# Patient Record
Sex: FEMALE | Race: WHITE | Employment: OTHER | URBAN - METROPOLITAN AREA
[De-identification: names, ages, dates, MRNs, and addresses within clinical notes are randomized per-mention and may not be internally consistent; named-entity substitution may affect disease eponyms.]

---

## 2017-02-13 DIAGNOSIS — G25.0 ESSENTIAL AND OTHER SPECIFIED FORMS OF TREMOR: ICD-10-CM

## 2017-02-13 DIAGNOSIS — M54.16 LUMBAR RADICULAR PAIN: ICD-10-CM

## 2017-02-13 DIAGNOSIS — M79.7 FIBROMYALGIA: ICD-10-CM

## 2017-02-13 DIAGNOSIS — F02.80 DEMENTIA IN ALZHEIMER'S DISEASE WITH LATE ONSET: ICD-10-CM

## 2017-02-13 DIAGNOSIS — M48.061 SPINAL STENOSIS, LUMBAR: ICD-10-CM

## 2017-02-13 DIAGNOSIS — R26.0 ATAXIC GAIT: ICD-10-CM

## 2017-02-13 DIAGNOSIS — G25.2 ESSENTIAL AND OTHER SPECIFIED FORMS OF TREMOR: ICD-10-CM

## 2017-02-13 DIAGNOSIS — M96.1 LUMBAR POST-LAMINECTOMY SYNDROME: ICD-10-CM

## 2017-02-13 DIAGNOSIS — G30.1 DEMENTIA IN ALZHEIMER'S DISEASE WITH LATE ONSET: ICD-10-CM

## 2017-02-13 RX ORDER — GABAPENTIN 300 MG/1
CAPSULE ORAL
Qty: 100 CAP | Refills: 5 | Status: SHIPPED | OUTPATIENT
Start: 2017-02-13 | End: 2017-08-18 | Stop reason: SDUPTHER

## 2017-02-28 ENCOUNTER — HOSPITAL ENCOUNTER (OUTPATIENT)
Dept: GENERAL RADIOLOGY | Age: 82
Discharge: HOME | End: 2017-02-28
Payer: MEDICARE

## 2017-02-28 DIAGNOSIS — M25.473 ANKLE EDEMA: ICD-10-CM

## 2017-02-28 DIAGNOSIS — M79.672 PAIN IN LEFT FOOT: ICD-10-CM

## 2017-02-28 PROCEDURE — 73610 X-RAY EXAM OF ANKLE: CPT

## 2017-02-28 PROCEDURE — 73630 X-RAY EXAM OF FOOT: CPT

## 2017-07-05 ENCOUNTER — TELEPHONE (OUTPATIENT)
Dept: NEUROLOGY | Age: 82
End: 2017-07-05

## 2017-07-05 NOTE — TELEPHONE ENCOUNTER
Balance is worse, memory worse, patient has fallen.   Needs to be seen prior to January 2018   Meredith Adams (Daughter)Please call her [phone]

## 2017-07-07 ENCOUNTER — OFFICE VISIT (OUTPATIENT)
Dept: NEUROLOGY | Age: 82
End: 2017-07-07

## 2017-07-07 VITALS
RESPIRATION RATE: 16 BRPM | SYSTOLIC BLOOD PRESSURE: 132 MMHG | BODY MASS INDEX: 28.32 KG/M2 | DIASTOLIC BLOOD PRESSURE: 60 MMHG | HEIGHT: 65 IN | HEART RATE: 79 BPM | OXYGEN SATURATION: 98 % | WEIGHT: 170 LBS

## 2017-07-07 DIAGNOSIS — I67.89 CEREBRAL MICROVASCULAR DISEASE: ICD-10-CM

## 2017-07-07 DIAGNOSIS — E55.9 VITAMIN D DEFICIENCY: ICD-10-CM

## 2017-07-07 DIAGNOSIS — R26.0 ATAXIC GAIT: ICD-10-CM

## 2017-07-07 DIAGNOSIS — F02.80 DEMENTIA IN ALZHEIMER'S DISEASE WITH LATE ONSET: Primary | ICD-10-CM

## 2017-07-07 DIAGNOSIS — I65.23 STENOSIS OF BOTH INTERNAL CAROTID ARTERIES: ICD-10-CM

## 2017-07-07 DIAGNOSIS — G25.0 ESSENTIAL AND OTHER SPECIFIED FORMS OF TREMOR: ICD-10-CM

## 2017-07-07 DIAGNOSIS — M54.16 LUMBAR RADICULAR PAIN: ICD-10-CM

## 2017-07-07 DIAGNOSIS — G30.1 DEMENTIA IN ALZHEIMER'S DISEASE WITH LATE ONSET: Primary | ICD-10-CM

## 2017-07-07 DIAGNOSIS — M96.1 LUMBAR POST-LAMINECTOMY SYNDROME: ICD-10-CM

## 2017-07-07 DIAGNOSIS — E03.4 HYPOTHYROIDISM DUE TO ACQUIRED ATROPHY OF THYROID: ICD-10-CM

## 2017-07-07 DIAGNOSIS — M48.061 SPINAL STENOSIS, LUMBAR: ICD-10-CM

## 2017-07-07 DIAGNOSIS — M79.7 FIBROMYALGIA: ICD-10-CM

## 2017-07-07 DIAGNOSIS — G25.2 ESSENTIAL AND OTHER SPECIFIED FORMS OF TREMOR: ICD-10-CM

## 2017-07-07 NOTE — LETTER
7/7/2017 8:53 PM 
 
Patient:  Kp Rangel YOB: 1934 Date of Visit: 7/7/2017 Dear No Recipients: Thank you for referring Ms. Kp Rangel to me for evaluation/treatment. Below are the relevant portions of my assessment and plan of care. Consult Subjective:  
 
Kp Rangel is a 80 y. o.right-handed  female seen today for evaluation of new problem of increasing gait instability, and 4 falls over the last 6-9 months, and increasing difficulty with some memory loss and increasing confusion noted by her physical therapist.  She is seen at the request of Dr. Aarti Sinclair. The family is aware that she is on a lot of medication and would like to have her taper some down. I suggested they consider tapering down her clonazepam at half a tablet every 2-4 weeks and see if they can get her off of that in view of the increased risk of benzodiazepines in the elderly. 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 She has known dementia and I advised the family to expect continued worsening of her memory loss with that. She is already on Aricept, and does not want to consider adding new medication. She denies any headache, any increased neck pain, but does have chronic low back pain and post lumbar laminectomy syndrome and is followed by pain management physician with intermittent injections in her spine. This may be also contributing to her gait instability. Both her daughters bring her in for urgent evaluation of these problems and are highly concerned and she seen on an urgent work in basis. We will refer her to physical therapy for gait training and strengthening and balance training in addition. We will check her metabolic parameters to see if there is any other treatable cause for her progressive symptoms.   We will also check a CT scan of the head in view of her frequent falls and increasing gait instability. [facility], but not really in assisted living. She still drives and has not had an accident but only drives locally. She feels no problem doing it. She has also seen for increasing back pain and has spinal cord stimulator insertion for post lumbar laminectomy syndrome. Patient had spinal cord stimulator inserted in April 2016, and is still recovering slowly, and she see her pain management doctor for increasing back pain. She has weakness in her legs and difficulty walking, and she had an MRI scan of the lumbar spine apparently shows a disc and spinal stenosis. She doesn't seem to have much new weakness in the legs, and her bowel and bladder function is stable except for increasing difficulty emptying her bladder. She is seeing a urologist. Because of her increased pain she sees Dr. Hamzah Metzger, her pain management specialist, who has treated her in the past.  She is not exercising like she is supposed too,but she says she will do it on her own now. She had previous back surgery and has a postlaminectomy syndrome, and now a new lumbar disc and spinal stenosis. Besides the weakness in her legs, she's had no other new weakness, sensory loss visual changes or headaches, or other new neurological problems except for the neurogenic bladder. She is using a cane or walker for ambulation because of her back pain. She Has had no other new medical problems complications or illnesses except for a bout of diverticulitis. She is on Aricept 10 mg a day and is on clonazepam .5 mg each day. She no longer uses Ambien for sleep and takes vitamins regularly.  Her past medical history is pertinent for hypertension, depression, GERD, asthma, arthritis, post laminectomy syndrome, lumbar radiculopathy, a central tremor, dementia, ataxic gait, cataract surgery, cervical fusion, right carpal tunnel syndrome, post cervical laminectomy fusion. Hysterectomy and bilateral salpingo-oophorectomy, gallbladder surgery, right carpal tunnel surgery. Past Medical History:  
Diagnosis Date  Arthritis  Asthma  Chronic pain  GERD (gastroesophageal reflux disease)  Hypertension  Vertigo Past Surgical History:  
Procedure Laterality Date  HX APPENDECTOMY [facility] BACK SURGERY  05/21/2015  
 stimulator [employer]  HX CATARACT REMOVAL    
 bilateral  
 HX CERVICAL FUSION    
 anterior disc fusion  HX HEENT    
 uppp  HX LAP CHOLECYSTECTOMY  HX ORTHOPAEDIC    
 right carpal tunnel release  HX MATT AND BSO Family History Problem Relation Age of Onset  Stroke Mother  Cancer Father Social History Substance Use Topics  Smoking status: Former Smoker  Smokeless tobacco: Never Used Comment: stopped 25 plus years ago  Alcohol use No  
   
Current Outpatient Prescriptions Medication Sig Dispense Refill  gabapentin (NEURONTIN) 300 mg capsule TAKE 1 CAPSULE BY MOUTH THREE TIMES A  Cap 5  
 CYANOCOBALAMIN, VITAMIN B-12, (VITAMIN B-12 PO) Take  by mouth daily.  aspirin delayed-release 81 mg tablet Take  by mouth daily.  CETIRIZINE HCL (ZYRTEC PO) Take  by mouth.  colestipol (COLESTID) 1 gram tablet Take 1 g by mouth two (2) times a day.  clonazePAM (KLONOPIN) 0.5 mg tablet 1 mg two (2) times a day. 1/2 tab @ noon, and 1 tab @ bedtime  FR-MV-DN-Fe-Min-Lycopen-Lutein (CENTRUM) 0.4-162-18 mg tab Take  by mouth.  traZODone (DESYREL) 50 mg tablet Take 50 mg by mouth nightly.  donepezil (ARICEPT) 10 mg tablet Take 1 Tab by mouth daily. 90 Tab 3  
 dicyclomine (BENTYL) 10 mg capsule Take 10 mg by mouth four (4) times daily.  losartan (COZAAR) 100 mg tablet Take 100 mg by mouth daily.  sertraline (ZOLOFT) 100 mg tablet Take 100 mg by mouth every morning.  omeprazole (PRILOSEC) 40 mg capsule Take 40 mg by mouth daily.  budesonide-formoterol (SYMBICORT) 160-4.5 mcg/Actuation HFA inhaler Take 2 Puffs by inhalation as needed. Allergies Allergen Reactions  Adhesive Tape Other (comments)  
  hurts the area it is placed on Review of Systems: A comprehensive review of systems was negative except for: Musculoskeletal: positive for myalgias, arthralgias, stiff joints and back pain Neurological: positive for memory problems and tremor Behvioral/Psych: positive for anxiety and depression Objective: I 
 
 
NEUROLOGICAL EXAM: 
 
Appearance: The patient is well developed, well nourished, provides a fair history and is in no acute distress. Mental Status: Oriented to time, place and person and the president, but misses the day of the month. Her speech is 100% intelligible, and she has mild cognitive impairment because of her dementia. She can't remember only one of 3 words at 30 seconds with distraction, and cannot subtract serial sevens, or spell the word world backward, or draw a clock that shows a time 10 minutes to 11. Patient is a little slow mentally for some recent memory loss but actually functioning fairly well in her ADLs. Mood and affect slightly anxious and depressed. Cranial Nerves:   Intact visual fields. Fundi are benign. FRANK, EOM's full, no nystagmus, no ptosis. Facial sensation is normal. Corneal reflexes are not tested. Facial movement is symmetric. Hearing is abnormal bilaterally. Palate is midline with normal sternocleidomastoid and trapezius muscles are normal. Tongue is midline. Neck without meningismus or bruits Motor:  4/5 strength in upper and lower proximal and distal muscles. Normal bulk and tone. No fasciculations. Patient has negative straight leg raising test negative in the sitting position but has percussion tenderness over the lumbar spine Patient had a spinal stimulator in the lumbar spine with stable surgical incisions Reflexes:   Deep tendon reflexes 1+/4 and symmetrical. No Babinski or clonus present Sensory:   Normal to touch, pinprick and DSS, and temperature and vibration mildly decreased in both lower extremities. Gait:  Abnormal gait for age, as she does move slowly due to arthritis in her back, walk with a limp on her right leg, and normally uses a cane for ambulation. Tremor:   Mild intention tremor noted. Cerebellar:  Mldly abnormal Romberg and tandem cerebellar signs present. Neurovascular:  Normal heart sounds and regular rhythm, peripheral pulses decreased in both feet, and no carotid bruits. Assessment:  
 
 
Plan:  
 
Patient is to start physical therapy for gait training and strengthening at her therapist and new prescription given to patient for therapy Patient is encouraged to continue her vitamins, vitamin D, remain mentally and physically active, and start exercising more 35 minutes spent with patient and the 2 daughters, and advised him that she has a progressive degenerative brain condition that will only get worse with time. Patient is to decrease her clonazepam slowly, one half tablet every 2-4 weeks to see if she can get off of it and perhaps stabilize her gait some more and possibly her memory Patient is to get CT of the head and metabolic parameters as ordered. Hopefully we will rule out treatable causes of her progressive symptoms. Patient is to continue Neurontin 300 mg 3 times a day because of her neuralgic type pain, increasing fibromyalgia symptoms and increasing back pain and nerve root pain, for now. She was advised of the side effects as far as drowsiness, sedation, dizziness, edema and weight gain Patient is to continue her other medication and try to continue a regular exercise program 
Patient is to see her pain management specialist, for continued treatment and spinal cord stimulator followup Patient is to continue current medications and start exercising more Patient is to start a regular exercise program and continue her multivitamins and vitamin D on a regular basis and her Aricept Patient not anxious for new medications like Namenda, and seems to handle her ADLs okay and is driving without difficulty around the house so far We discussed with her and her daughter in detail, if there is any doubt about her driving ability she should stop immediately or get tested by [facility] Patient will be seen again in 6 months time or earlier as needed. She will call if any problem in the interim. Discuss her condition with patient and daughters at length,  
Time of visit was 35 minutes today She is to keep the lights on in the house at night and be careful when she moves or changes position quickly She will call if any problem in the interim. Signed By: Cline Nyhan, MD   
 July 7, 2017 This note will not be viewable in [address] If you have questions, please do not hesitate to call me. I look forward to following Ms. Rell Price along with you. Sincerely, Cline Nyhan, MD

## 2017-07-07 NOTE — MR AVS SNAPSHOT
Visit Information Date & Time Provider Department Dept. Phone Encounter #  
 7/7/2017 12:00 PM Heraclio Walker MD Neurology Clinic at [facility] [phone] [id number] Follow-up Instructions Return in about 6 months (around 1/7/2018). Your Appointments 1/9/2018  3:00 PM  
Follow Up with Heraclio Walker MD  
Neurology Clinic at [facility] Appt Note: follow up. ..tlw 6/28/17  
 [address] [facility], 
[address] [place] [address], [address] [phone]  
[address] [facility], [address] [place] [address], [address] [phone] Upcoming Health Maintenance Date Due DTaP/Tdap/Td series (1 - Tdap) 6/24/1955 ZOSTER VACCINE AGE 60> 6/24/1994 GLAUCOMA SCREENING Q2Y 6/24/1999 OSTEOPOROSIS SCREENING (DEXA) 6/24/1999 Pneumococcal 65+ Low/Medium Risk (1 of 2 - PCV13) 6/24/1999 MEDICARE YEARLY EXAM 6/24/1999 INFLUENZA AGE 9 TO ADULT 8/1/2017 Allergies as of 7/7/2017  Review Complete On: 7/7/2017 By: Heraclio Walker MD  
  
 Severity Noted Reaction Type Reactions Adhesive Tape Low 07/15/2010   Side Effect Other (comments)  
 hurts the area it is placed on  
  
Current Immunizations  Never Reviewed No immunizations on file. Not reviewed this visit You Were Diagnosed With   
  
 Codes Comments Dementia in Alzheimer's disease with late onset    -  Primary ICD-10-CM: G30.1, F02.80 ICD-9-CM: 331.0, 294.10 Lumbar radicular pain     ICD-10-CM: M54.16 
ICD-9-CM: 724.4 Essential and other specified forms of tremor     ICD-10-CM: G25.0, G25.2 ICD-9-CM: 333.1 Spinal stenosis, lumbar     ICD-10-CM: M48.06 
ICD-9-CM: 724.02 Lumbar post-laminectomy syndrome     ICD-10-CM: M96.1 ICD-9-CM: 722.83 Ataxic gait     ICD-10-CM: R26.0 ICD-9-CM: 481. 2 Fibromyalgia     ICD-10-CM: M79.7 ICD-9-CM: 729.1 Vitamin D deficiency     ICD-10-CM: E55.9 ICD-9-CM: 268.9 Hypothyroidism due to acquired atrophy of thyroid     ICD-10-CM: E03.4 ICD-9-CM: 244.8, 246.8 Cerebral microvascular disease     ICD-10-CM: I67.9 ICD-9-CM: 437.9 Stenosis of both internal carotid arteries     ICD-10-CM: I65.23 ICD-9-CM: 433.10, 433.30 Vitals BP Pulse Resp Height(growth percentile) Weight(growth percentile) SpO2  
 132/60 79 16 5' 5\" (1.651 m) 170 lb (77.1 kg) 98% BMI Smoking Status 28.29 kg/m2 Former Smoker Vitals History BMI and BSA Data Body Mass Index Body Surface Area  
 28.29 kg/m 2 1.88 m 2 [facility] [address] [facility] [place] [address] [phone] Your Updated Medication List  
  
   
This list is accurate as of: 7/7/17  1:06 PM.  Always use your most recent med list.  
  
  
  
  
 aspirin delayed-release 81 mg tablet Take  by mouth daily. CENTRUM 0.4-162-18 mg Tab Generic drug:  YL-ON-BA-Fe-Min-Lycopen-Lutein Take  by mouth.  
  
 clonazePAM 0.5 mg tablet Commonly known as:  KlonoPIN  
1 mg two (2) times a day. 1/2 tab @ noon, and 1 tab @ bedtime COLESTID 1 gram tablet Generic drug:  colestipol Take 1 g by mouth two (2) times a day. dicyclomine 10 mg capsule Commonly known as:  BENTYL Take 10 mg by mouth four (4) times daily. donepezil 10 mg tablet Commonly known as:  ARICEPT Take 1 Tab by mouth daily. gabapentin 300 mg capsule Commonly known as:  NEURONTIN  
TAKE 1 CAPSULE BY MOUTH THREE TIMES A DAY  
  
 losartan 100 mg tablet Commonly known as:  COZAAR Take 100 mg by mouth daily. omeprazole 40 mg capsule Commonly known as:  PRILOSEC Take 40 mg by mouth daily. sertraline 100 mg tablet Commonly known as:  ZOLOFT Take 100 mg by mouth every morning. SYMBICORT 160-4.5 mcg/actuation HFA inhaler Generic drug:  budesonide-formoterol Take 2 Puffs by inhalation as needed. traZODone 50 mg tablet Commonly known as:  Wilmer Ort Take 50 mg by mouth nightly. VITAMIN B-12 PO Take  by mouth daily. ZYRTEC PO Take  by mouth. We Performed the Following GABAPENTIN [id number] CPT(R)] TSH 3RD GENERATION [15887 CPT(R)] VITAMIN B12 & FOLATE [92192 CPT(R)] VITAMIN D, 25 HYROXY PANEL [[id number] Custom] Follow-up Instructions Return in about 6 months (around 1/7/2018). To-Do List   
 07/13/2017 Imaging:  CT HEAD WO CONT   
  
 07/13/2017 Imaging:  DUPLEX CAROTID BILATERAL AMB NEURO Patient Instructions A Healthy Lifestyle: Care Instructions Your Care Instructions A healthy lifestyle can help you feel good, stay at a healthy weight, and have plenty of energy for both work and play. A healthy lifestyle is something you can share with your whole family. A healthy lifestyle also can lower your risk for serious health problems, such as high blood pressure, heart disease, and diabetes. You can follow a few steps listed below to improve your health and the health of your family. Follow-up care is a key part of your treatment and safety. Be sure to make and go to all appointments, and call your doctor if you are having problems. Its also a good idea to know your test results and keep a list of the medicines you take. How can you care for yourself at home? · Do not eat too much sugar, fat, or fast foods. You can still have dessert and treats now and then. The goal is moderation. · Start small to improve your eating habits. Pay attention to portion sizes, drink less juice and soda pop, and eat more fruits and vegetables. ¨ Eat a healthy amount of food. A 3-ounce serving of meat, for example, is about the size of a deck of cards. Fill the rest of your plate with vegetables and whole grains. ¨ Limit the amount of soda and sports drinks you have every day.  Drink more water when you are thirsty. ¨ Eat at least 5 servings of fruits and vegetables every day. It may seem like a lot, but it is not hard to reach this goal. A serving or helping is 1 piece of fruit, 1 cup of vegetables, or 2 cups of leafy, raw vegetables. Have an apple or some carrot sticks as an afternoon snack instead of a candy bar. Try to have fruits and/or vegetables at every meal. 
· Make exercise part of your daily routine. You may want to start with simple activities, such as walking, bicycling, or slow swimming. Try to be active 30 to 60 minutes every day. You do not need to do all 30 to 60 minutes all at once. For example, you can exercise 3 times a day for 10 or 20 minutes. Moderate exercise is safe for most people, but it is always a good idea to talk to your doctor before starting an exercise program. 
· Keep moving. Ahsan Boron the lawn, work in the garden, or Relayr. Take the stairs instead of the elevator at work. · If you smoke, quit. People who smoke have an increased risk for heart attack, stroke, cancer, and other lung illnesses. Quitting is hard, but there are ways to boost your chance of quitting tobacco for good. ¨ Use nicotine gum, patches, or lozenges. ¨ Ask your doctor about stop-smoking programs and medicines. ¨ Keep trying. In addition to reducing your risk of diseases in the future, you will notice some benefits soon after you stop using tobacco. If you have shortness of breath or asthma symptoms, they will likely get better within a few weeks after you quit. · Limit how much alcohol you drink. Moderate amounts of alcohol (up to 2 drinks a day for men, 1 drink a day for women) are okay. But drinking too much can lead to liver problems, high blood pressure, and other health problems. Family health If you have a family, there are many things you can do together to improve your health. · Eat meals together as a family as often as possible. · Eat healthy foods. This includes fruits, vegetables, lean meats and dairy, and whole grains. · Include your family in your fitness plan. Most people think of activities such as jogging or tennis as the way to fitness, but there are many ways you and your family can be more active. Anything that makes you breathe hard and gets your heart pumping is exercise. Here are some tips: 
¨ Walk to do errands or to take your child to school or the bus. ¨ Go for a family bike ride after dinner instead of watching TV. Where can you learn more? Go to http://marthaTimetricgiuseppe.info/. Enter C749 in the search box to learn more about \"A Healthy Lifestyle: Care Instructions. \" Current as of: July 26, 2016 Content Version: 11.3 © 4755-3494 [employer]. Care instructions adapted under license by [employer] (which disclaims liability or warranty for this information). If you have questions about a medical condition or this instruction, always ask your healthcare professional. [place] [address] any warranty or liability for your use of this information. Introducing [facility]! [facility] introduces [employer] patient portal. Now you can access parts of your medical record, email your doctor's office, and request medication refills online. 1. In your internet browser, go to [email or web address] 2. Click on the First Time User? Click Here link in the Sign In box. You will see the New Member Sign Up page. 3. Enter your [employer] Access Code exactly as it appears below. You will not need to use this code after youve completed the sign-up process. If you do not sign up before the expiration date, you must request a new code. · [employer] Access Code: [email or web address] Expires: 10/5/2017  1:06 PM 
 
4. Enter the last four digits of your Social Security Number (xxxx) and Date of Birth (mm/dd/yyyy) as indicated and click Submit.  You will be taken to the next sign-up page. 5. Create a [employer] ID. This will be your [employer] login ID and cannot be changed, so think of one that is secure and easy to remember. 6. Create a [employer] password. You can change your password at any time. 7. Enter your Password Reset Question and Answer. This can be used at a later time if you forget your password. 8. Enter your e-mail address. You will receive e-mail notification when new information is available in [address] 9. Click Sign Up. You can now view and download portions of your medical record. 10. Click the Download Summary menu link to download a portable copy of your medical information. If you have questions, please visit the Frequently Asked Questions section of the [employer] website. Remember, [employer] is NOT to be used for urgent needs. For medical emergencies, dial 911. Now available from your iPhone and Android! Please provide this summary of care documentation to your next provider. Your primary care clinician is listed as Gunjan Cherry III. If you have any questions after today's visit, please call [phone].

## 2017-07-07 NOTE — PATIENT INSTRUCTIONS

## 2017-07-08 NOTE — PROGRESS NOTES
Consult    Subjective:     Shankar Steinberg is a 80 y. o.right-handed  female seen today for evaluation of new problem of increasing gait instability, and 4 falls over the last 6-9 months, and increasing difficulty with some memory loss and increasing confusion noted by her physical therapist.  She is seen at the request of Dr. Babak Ordonez. The family is aware that she is on a lot of medication and would like to have her taper some down. I suggested they consider tapering down her clonazepam at half a tablet every 2-4 weeks and see if they can get her off of that in view of the increased risk of benzodiazepines in the elderly. 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 She has known dementia and I advised the family to expect continued worsening of her memory loss with that. She is already on Aricept, and does not want to consider adding new medication. She denies any headache, any increased neck pain, but does have chronic low back pain and post lumbar laminectomy syndrome and is followed by pain management physician with intermittent injections in her spine. This may be also contributing to her gait instability. Both her daughters bring her in for urgent evaluation of these problems and are highly concerned and she seen on an urgent work in basis. We will refer her to physical therapy for gait training and strengthening and balance training in addition. We will check her metabolic parameters to see if there is any other treatable cause for her progressive symptoms. We will also check a CT scan of the head in view of her frequent falls and increasing gait instability. [facility], but not really in assisted living. She still drives and has not had an accident but only drives locally. She feels no problem doing it.   She has also seen for increasing back pain and has spinal cord stimulator insertion for post lumbar laminectomy syndrome. Patient had spinal cord stimulator inserted in April 2016, and is still recovering slowly, and she see her pain management doctor for increasing back pain. She has weakness in her legs and difficulty walking, and she had an MRI scan of the lumbar spine apparently shows a disc and spinal stenosis. She doesn't seem to have much new weakness in the legs, and her bowel and bladder function is stable except for increasing difficulty emptying her bladder. She is seeing a urologist. Because of her increased pain she sees Dr. Micaela Cheek, her pain management specialist, who has treated her in the past.  She is not exercising like she is supposed too,but she says she will do it on her own now. She had previous back surgery and has a postlaminectomy syndrome, and now a new lumbar disc and spinal stenosis. Besides the weakness in her legs, she's had no other new weakness, sensory loss visual changes or headaches, or other new neurological problems except for the neurogenic bladder. She is using a cane or walker for ambulation because of her back pain. She Has had no other new medical problems complications or illnesses except for a bout of diverticulitis. She is on Aricept 10 mg a day and is on clonazepam .5 mg each day. She no longer uses Ambien for sleep and takes vitamins regularly. Her past medical history is pertinent for hypertension, depression, GERD, asthma, arthritis, post laminectomy syndrome, lumbar radiculopathy, a central tremor, dementia, ataxic gait, cataract surgery, cervical fusion, right carpal tunnel syndrome, post cervical laminectomy fusion. Hysterectomy and bilateral salpingo-oophorectomy, gallbladder surgery, right carpal tunnel surgery.     Past Medical History:   Diagnosis Date    Arthritis     Asthma     Chronic pain     GERD (gastroesophageal reflux disease)     Hypertension     Vertigo       Past Surgical History:   Procedure Laterality Date    HX APPENDECTOMY      HX BACK SURGERY  05/21/2015    stimulator [employer]     HX CATARACT REMOVAL      bilateral    HX CERVICAL FUSION      anterior disc fusion    HX HEENT      uppp    HX LAP CHOLECYSTECTOMY      HX ORTHOPAEDIC      right carpal tunnel release    HX MATT AND BSO       Family History   Problem Relation Age of Onset    Stroke Mother     Cancer Father       Social History   Substance Use Topics    Smoking status: Former Smoker    Smokeless tobacco: Never Used      Comment: stopped 25 plus years ago    Alcohol use No       Current Outpatient Prescriptions   Medication Sig Dispense Refill    gabapentin (NEURONTIN) 300 mg capsule TAKE 1 CAPSULE BY MOUTH THREE TIMES A  Cap 5    CYANOCOBALAMIN, VITAMIN B-12, (VITAMIN B-12 PO) Take  by mouth daily.  aspirin delayed-release 81 mg tablet Take  by mouth daily.  CETIRIZINE HCL (ZYRTEC PO) Take  by mouth.  colestipol (COLESTID) 1 gram tablet Take 1 g by mouth two (2) times a day.  clonazePAM (KLONOPIN) 0.5 mg tablet 1 mg two (2) times a day. 1/2 tab @ noon, and 1 tab @ bedtime      BP-QN-NV-Fe-Min-Lycopen-Lutein (CENTRUM) 0.4-162-18 mg tab Take  by mouth.  traZODone (DESYREL) 50 mg tablet Take 50 mg by mouth nightly.  donepezil (ARICEPT) 10 mg tablet Take 1 Tab by mouth daily. 90 Tab 3    dicyclomine (BENTYL) 10 mg capsule Take 10 mg by mouth four (4) times daily.  losartan (COZAAR) 100 mg tablet Take 100 mg by mouth daily.  sertraline (ZOLOFT) 100 mg tablet Take 100 mg by mouth every morning.  omeprazole (PRILOSEC) 40 mg capsule Take 40 mg by mouth daily.  budesonide-formoterol (SYMBICORT) 160-4.5 mcg/Actuation HFA inhaler Take 2 Puffs by inhalation as needed.           Allergies   Allergen Reactions    Adhesive Tape Other (comments)     hurts the area it is placed on        Review of Systems:  A comprehensive review of systems was negative except for: Musculoskeletal: positive for myalgias, arthralgias, stiff joints and back pain  Neurological: positive for memory problems and tremor  Behvioral/Psych: positive for anxiety and depression     Objective:     I      NEUROLOGICAL EXAM:    Appearance: The patient is well developed, well nourished, provides a fair history and is in no acute distress. Mental Status: Oriented to time, place and person and the president, but misses the day of the month. Her speech is 100% intelligible, and she has mild cognitive impairment because of her dementia. She can't remember only one of 3 words at 30 seconds with distraction, and cannot subtract serial sevens, or spell the word world backward, or draw a clock that shows a time 10 minutes to 11. Patient is a little slow mentally for some recent memory loss but actually functioning fairly well in her ADLs. Mood and affect slightly anxious and depressed. Cranial Nerves:   Intact visual fields. Fundi are benign. FRANK, EOM's full, no nystagmus, no ptosis. Facial sensation is normal. Corneal reflexes are not tested. Facial movement is symmetric. Hearing is abnormal bilaterally. Palate is midline with normal sternocleidomastoid and trapezius muscles are normal. Tongue is midline. Neck without meningismus or bruits   Motor:  4/5 strength in upper and lower proximal and distal muscles. Normal bulk and tone. No fasciculations. Patient has negative straight leg raising test negative in the sitting position but has percussion tenderness over the lumbar spine  Patient had a spinal stimulator in the lumbar spine with stable surgical incisions   Reflexes:   Deep tendon reflexes 1+/4 and symmetrical. No Babinski or clonus present   Sensory:   Normal to touch, pinprick and DSS, and temperature and vibration mildly decreased in both lower extremities. Gait:  Abnormal gait for age, as she does move slowly due to arthritis in her back, walk with a limp on her right leg, and normally uses a cane for ambulation.    Tremor:   Mild intention tremor noted. Cerebellar:  Mldly abnormal Romberg and tandem cerebellar signs present. Neurovascular:  Normal heart sounds and regular rhythm, peripheral pulses decreased in both feet, and no carotid bruits. Assessment:       Plan:     Patient is to start physical therapy for gait training and strengthening at her therapist and new prescription given to patient for therapy  Patient is encouraged to continue her vitamins, vitamin D, remain mentally and physically active, and start exercising more  35 minutes spent with patient and the 2 daughters, and advised him that she has a progressive degenerative brain condition that will only get worse with time. Patient is to decrease her clonazepam slowly, one half tablet every 2-4 weeks to see if she can get off of it and perhaps stabilize her gait some more and possibly her memory  Patient is to get CT of the head and metabolic parameters as ordered. Hopefully we will rule out treatable causes of her progressive symptoms. Patient is to continue Neurontin 300 mg 3 times a day because of her neuralgic type pain, increasing fibromyalgia symptoms and increasing back pain and nerve root pain, for now.   She was advised of the side effects as far as drowsiness, sedation, dizziness, edema and weight gain  Patient is to continue her other medication and try to continue a regular exercise program  Patient is to see her pain management specialist, for continued treatment and spinal cord stimulator followup  Patient is to continue current medications and start exercising more   Patient is to start a regular exercise program and continue her multivitamins and vitamin D on a regular basis and her Aricept  Patient not anxious for new medications like Namenda, and seems to handle her ADLs okay and is driving without difficulty around the house so far  We discussed with her and her daughter in detail, if there is any doubt about her driving ability she should stop immediately or get tested by [facility]  Patient will be seen again in 6 months time or earlier as needed. She will call if any problem in the interim. Discuss her condition with patient and daughters at length,   Time of visit was 35 minutes today  She is to keep the lights on in the house at night and be careful when she moves or changes position quickly  She will call if any problem in the interim. Signed By: Alisha Palacios MD     July 7, 2017       This note will not be viewable in [address]

## 2017-07-11 LAB
25(OH)D2 SERPL-MCNC: 1.9 NG/ML
25(OH)D3 SERPL-MCNC: 30 NG/ML
25(OH)D3+25(OH)D2 SERPL-MCNC: 32 NG/ML
FOLATE SERPL-MCNC: 15.9 NG/ML
GABAPENTIN SERPLBLD-MCNC: 5.9 UG/ML (ref 4–16)
TSH SERPL DL<=0.005 MIU/L-ACNC: 2.15 UIU/ML (ref 0.45–4.5)
VIT B12 SERPL-MCNC: 1245 PG/ML (ref 211–946)

## 2017-07-12 ENCOUNTER — HOSPITAL ENCOUNTER (OUTPATIENT)
Dept: CT IMAGING | Age: 82
Discharge: HOME | End: 2017-07-12
Payer: MEDICARE

## 2017-07-12 DIAGNOSIS — M96.1 LUMBAR POST-LAMINECTOMY SYNDROME: ICD-10-CM

## 2017-07-12 DIAGNOSIS — M79.7 FIBROMYALGIA: ICD-10-CM

## 2017-07-12 DIAGNOSIS — M54.16 LUMBAR RADICULAR PAIN: ICD-10-CM

## 2017-07-12 DIAGNOSIS — I67.89 CEREBRAL MICROVASCULAR DISEASE: ICD-10-CM

## 2017-07-12 DIAGNOSIS — E55.9 VITAMIN D DEFICIENCY: ICD-10-CM

## 2017-07-12 DIAGNOSIS — I65.23 STENOSIS OF BOTH INTERNAL CAROTID ARTERIES: ICD-10-CM

## 2017-07-12 DIAGNOSIS — G25.0 ESSENTIAL AND OTHER SPECIFIED FORMS OF TREMOR: ICD-10-CM

## 2017-07-12 DIAGNOSIS — E03.4 HYPOTHYROIDISM DUE TO ACQUIRED ATROPHY OF THYROID: ICD-10-CM

## 2017-07-12 DIAGNOSIS — M48.061 SPINAL STENOSIS, LUMBAR: ICD-10-CM

## 2017-07-12 DIAGNOSIS — G30.1 DEMENTIA IN ALZHEIMER'S DISEASE WITH LATE ONSET: ICD-10-CM

## 2017-07-12 DIAGNOSIS — G25.2 ESSENTIAL AND OTHER SPECIFIED FORMS OF TREMOR: ICD-10-CM

## 2017-07-12 DIAGNOSIS — F02.80 DEMENTIA IN ALZHEIMER'S DISEASE WITH LATE ONSET: ICD-10-CM

## 2017-07-12 DIAGNOSIS — R26.0 ATAXIC GAIT: ICD-10-CM

## 2017-07-12 PROCEDURE — 70450 CT HEAD/BRAIN W/O DYE: CPT

## 2017-07-13 ENCOUNTER — OFFICE VISIT (OUTPATIENT)
Dept: NEUROLOGY | Age: 82
End: 2017-07-13

## 2017-07-13 DIAGNOSIS — G30.1 DEMENTIA IN ALZHEIMER'S DISEASE WITH LATE ONSET: ICD-10-CM

## 2017-07-13 DIAGNOSIS — E55.9 VITAMIN D DEFICIENCY: ICD-10-CM

## 2017-07-13 DIAGNOSIS — G25.2 ESSENTIAL AND OTHER SPECIFIED FORMS OF TREMOR: ICD-10-CM

## 2017-07-13 DIAGNOSIS — M96.1 LUMBAR POST-LAMINECTOMY SYNDROME: ICD-10-CM

## 2017-07-13 DIAGNOSIS — M54.16 LUMBAR RADICULAR PAIN: ICD-10-CM

## 2017-07-13 DIAGNOSIS — M79.7 FIBROMYALGIA: ICD-10-CM

## 2017-07-13 DIAGNOSIS — M48.061 SPINAL STENOSIS, LUMBAR: ICD-10-CM

## 2017-07-13 DIAGNOSIS — E03.4 HYPOTHYROIDISM DUE TO ACQUIRED ATROPHY OF THYROID: ICD-10-CM

## 2017-07-13 DIAGNOSIS — F02.80 DEMENTIA IN ALZHEIMER'S DISEASE WITH LATE ONSET: ICD-10-CM

## 2017-07-13 DIAGNOSIS — I65.23 STENOSIS OF BOTH INTERNAL CAROTID ARTERIES: ICD-10-CM

## 2017-07-13 DIAGNOSIS — I67.89 CEREBRAL MICROVASCULAR DISEASE: Primary | ICD-10-CM

## 2017-07-13 DIAGNOSIS — R26.0 ATAXIC GAIT: ICD-10-CM

## 2017-07-13 DIAGNOSIS — G25.0 ESSENTIAL AND OTHER SPECIFIED FORMS OF TREMOR: ICD-10-CM

## 2017-07-14 NOTE — PROCEDURES
This study consisted of pulsed wave Doppler examination, Color-flow imaging, and Duplex imaging of both the right and left carotid systems, and both vertebral arteries.        Imaging of both right and left carotid systems showed mild mixed plaquing at the bifurcations and proximal and distal internal and external carotid arteries bilaterally, with stenosis in the range of 16-49 % only and with no flow abnormalities identified.        Left vertebral artery showed normal antegrade flow, but the right vertebral artery was not able to be imaged, most likely secondary to technical difficulty, but cannot completely rule out occlusive disease or congenital variant.   If clinically indicated other imaging modalities like MRA or CTA may be of further diagnostic benefit.         Clinical correlation recommended

## 2017-07-17 ENCOUNTER — TELEPHONE (OUTPATIENT)
Dept: NEUROLOGY | Age: 82
End: 2017-07-17

## 2017-07-17 NOTE — TELEPHONE ENCOUNTER
----- Message from Felipe Hernandez sent at 7/17/2017  9:11 AM EDT -----  Regarding: Dr. Mary Beth Kumar  Pt's daughter Yanci Rodriguez called to get results from Doppler and CT done last week on 7/12, 7/13. Her best contact number is [phone].

## 2017-07-17 NOTE — TELEPHONE ENCOUNTER
----- Message from Shelly Santoro sent at 7/17/2017  9:11 AM EDT -----  Regarding: Dr. Jyoti Cutler Pt's daughter Stefano Roman called to get results from Doppler and CT done last week on 7/12, 7/13. Her best contact number is [phone].

## 2017-07-18 NOTE — TELEPHONE ENCOUNTER
Erik Munoz already talked to her in the hospital when I met her in the hallway, can you just call her and see if she still needs another call back because I already gave her the results

## 2017-07-21 ENCOUNTER — TELEPHONE (OUTPATIENT)
Dept: NEUROLOGY | Age: 82
End: 2017-07-21

## 2017-07-21 NOTE — TELEPHONE ENCOUNTER
----- Message from Haris Cartagena sent at 7/21/2017  8:35 AM EDT -----  Regarding: /telephone  Philayaka Diastcher,pt's daughter, have some concerns about the pt's diagnoses that's on her record. Best contact number is [phone].

## 2017-07-25 ENCOUNTER — TELEPHONE (OUTPATIENT)
Dept: NEUROLOGY | Age: 82
End: 2017-07-25

## 2017-07-25 NOTE — TELEPHONE ENCOUNTER
----- Message from Nawaf Mueller sent at 7/25/2017 10:56 AM EDT -----  Regarding: Dr. Miramontes/ telephone   Pt would like a return phone call regarding the diagnosis of dementia and Alzheimer's disease late onset. Pt and her daughter would like to discuss the diagnosis and get more information, because the pt and her daughter feel like she does not have it. Pt can be reached at [phone]. Hugh Dickinson, pt's daughter would like to speak with Dr. Neida Hunter, and needs to know when is the best time to reach him. Hugh Dickinson can be reached at [phone].

## 2017-07-25 NOTE — TELEPHONE ENCOUNTER
I called the patient, told her she has mild early dementia of Alzheimer's type, she has been on Aricept for years for the same problem, and she should continue that as she is being treated for.

## 2017-08-18 DIAGNOSIS — M79.7 FIBROMYALGIA: ICD-10-CM

## 2017-08-18 DIAGNOSIS — M48.061 SPINAL STENOSIS, LUMBAR: ICD-10-CM

## 2017-08-18 DIAGNOSIS — G30.1 DEMENTIA IN ALZHEIMER'S DISEASE WITH LATE ONSET: ICD-10-CM

## 2017-08-18 DIAGNOSIS — F02.80 DEMENTIA IN ALZHEIMER'S DISEASE WITH LATE ONSET: ICD-10-CM

## 2017-08-18 DIAGNOSIS — G25.2 ESSENTIAL AND OTHER SPECIFIED FORMS OF TREMOR: ICD-10-CM

## 2017-08-18 DIAGNOSIS — M54.16 LUMBAR RADICULAR PAIN: ICD-10-CM

## 2017-08-18 DIAGNOSIS — G25.0 ESSENTIAL AND OTHER SPECIFIED FORMS OF TREMOR: ICD-10-CM

## 2017-08-18 DIAGNOSIS — M96.1 LUMBAR POST-LAMINECTOMY SYNDROME: ICD-10-CM

## 2017-08-18 DIAGNOSIS — R26.0 ATAXIC GAIT: ICD-10-CM

## 2017-08-18 RX ORDER — GABAPENTIN 300 MG/1
CAPSULE ORAL
Qty: 270 CAP | Refills: 2 | Status: SHIPPED | OUTPATIENT
Start: 2017-08-18 | End: 2018-01-09 | Stop reason: DRUGHIGH

## 2017-08-18 NOTE — TELEPHONE ENCOUNTER
Future Appointments  Date Time Provider Durga Lizy   1/9/2018 3:00 PM Tangela Cabrera MD [address]                         Last Appointment My Department:  7/13/2017    Please advise of refill below.   Requested Prescriptions     Pending Prescriptions Disp Refills    gabapentin (NEURONTIN) 300 mg capsule 270 Cap 2     Sig: TAKE 1 CAPSULE BY MOUTH THREE TIMES A DAY

## 2017-12-16 ENCOUNTER — HOSPITAL ENCOUNTER (OUTPATIENT)
Dept: CT IMAGING | Age: 82
Discharge: HOME | End: 2017-12-16
Attending: PHYSICAL MEDICINE & REHABILITATION
Payer: MEDICARE

## 2017-12-16 DIAGNOSIS — M47.817 LUMBOSACRAL SPONDYLOSIS WITHOUT MYELOPATHY: ICD-10-CM

## 2017-12-16 DIAGNOSIS — M54.41 BILATERAL LOW BACK PAIN WITH RIGHT-SIDED SCIATICA: ICD-10-CM

## 2017-12-16 PROCEDURE — 72131 CT LUMBAR SPINE W/O DYE: CPT

## 2018-01-09 ENCOUNTER — OFFICE VISIT (OUTPATIENT)
Dept: NEUROLOGY | Age: 83
End: 2018-01-09

## 2018-01-09 VITALS
DIASTOLIC BLOOD PRESSURE: 66 MMHG | BODY MASS INDEX: 27.66 KG/M2 | OXYGEN SATURATION: 96 % | HEIGHT: 65 IN | SYSTOLIC BLOOD PRESSURE: 148 MMHG | HEART RATE: 96 BPM | WEIGHT: 166 LBS

## 2018-01-09 DIAGNOSIS — M54.16 LUMBAR RADICULAR PAIN: ICD-10-CM

## 2018-01-09 DIAGNOSIS — G25.0 BENIGN ESSENTIAL TREMOR SYNDROME: ICD-10-CM

## 2018-01-09 DIAGNOSIS — R26.0 ATAXIC GAIT: ICD-10-CM

## 2018-01-09 DIAGNOSIS — M79.7 FIBROMYALGIA: ICD-10-CM

## 2018-01-09 PROBLEM — F33.9 RECURRENT DEPRESSION (HCC): Status: ACTIVE | Noted: 2018-01-09

## 2018-01-09 RX ORDER — GABAPENTIN 600 MG/1
600 TABLET ORAL 3 TIMES DAILY
Qty: 100 TAB | Refills: 11 | Status: SHIPPED | OUTPATIENT
Start: 2018-01-09 | End: 2018-07-06 | Stop reason: ALTCHOICE

## 2018-01-09 NOTE — LETTER
1/9/2018 8:43 PM 
 
Patient:  Lester Gill YOB: 1934 Date of Visit: 1/9/2018 Dear No Recipients: Thank you for referring Ms. Lester Gill to me for evaluation/treatment. Below are the relevant portions of my assessment and plan of care. Consult Subjective:  
 
Lester Gill is a 80 y. o.right-handed  female seen today at the request of Dr. Grey Gillespie for evaluation of new problem of increasing lower back pain that began in early December, and was severe, and she saw her pain management physician who increased her Neurontin to 600 mg from 300 mg 3 times a day, and that has finally over the last several days just started to provide relief for her back pain. She is trying to avoid repeat steroid injections and nerve blocks in her back. Since that is helping we told her she needs to continue that dose but she has no refill so we gave her refills for that medication. She has moved in with her daughter and her physical health seems to have taken a marked turn for the better. She seems less depressed and much more with it, and does not seem to have his may behavioral problems as much trouble with her memory. She continues to take the Aricept, does not think she needs any new medication. Her daughter feels that she is stable also. She has had no more falls, and 6 months ago she had gait instability, and 4 falls over the last 6-9 months, and increasing difficulty with some memory loss and increasing confusion noted by her physical therapist.  That all seems to be somewhat better. The patient's family was concerned about her taking too much medication, remain to taper her clonazepam down to one half a 0.5 mg tablet twice a day she is seems to be able to control her intestinal spasms with that and is doing much better.   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 She has known dementia and I advised the family to expect continued worsening of her memory loss with that. She is already on Aricept, and does not want to consider adding new medication. She denies any headache, any increased neck pain, but does have chronic low back pain and post lumbar laminectomy syndrome and is followed by pain management physician with intermittent injections in her spine. This may be also contributing to her gait instability. She had a normal CT of the head 6 months ago because of her ataxia, and did physical therapy. She still drives and has not had an accident but only drives locally. She feels no problem doing it. She has also seen for increasing back pain and has spinal cord stimulator insertion for post lumbar laminectomy syndrome. Patient had spinal cord stimulator inserted in April 2016, and is still recovering slowly, and she see her pain management doctor for increasing back pain. She has weakness in her legs and difficulty walking, and she had an MRI scan of the lumbar spine apparently shows a disc and spinal stenosis. She doesn't seem to have much new weakness in the legs, and her bowel and bladder function is stable except for increasing difficulty emptying her bladder. She is seeing a urologist. Because of her increased pain she sees Dr. Apple Smiley, her pain management specialist, who has treated her in the past.  She is not exercising like she is supposed too,but she says she will do it on her own now. She had previous back surgery and has a postlaminectomy syndrome, and now a new lumbar disc and spinal stenosis. Besides the weakness in her legs, she's had no other new weakness, sensory loss visual changes or headaches, or other new neurological problems except for the neurogenic bladder. She is using a cane or walker for ambulation because of her back pain.  
 
She Has had no other new medical problems complications or illnesses except for a bout of diverticulitis. She is on Aricept 10 mg a day and is on clonazepam .5 mg each day. She no longer uses Ambien for sleep and takes vitamins regularly. Her past medical history is pertinent for hypertension, depression, GERD, asthma, arthritis, post laminectomy syndrome, lumbar radiculopathy, a central tremor, dementia, ataxic gait, cataract surgery, cervical fusion, right carpal tunnel syndrome, post cervical laminectomy fusion. Hysterectomy and bilateral salpingo-oophorectomy, gallbladder surgery, right carpal tunnel surgery. Past Medical History:  
Diagnosis Date  Arthritis  Asthma  Chronic pain  GERD (gastroesophageal reflux disease)  Hypertension  Vertigo Past Surgical History:  
Procedure Laterality Date  HX APPENDECTOMY [facility] BACK SURGERY  05/21/2015  
 stimulator [employer]  HX CATARACT REMOVAL    
 bilateral  
 HX CERVICAL FUSION    
 anterior disc fusion  HX HEENT    
 uppp  HX LAP CHOLECYSTECTOMY  HX ORTHOPAEDIC    
 right carpal tunnel release  HX MATT AND BSO Family History Problem Relation Age of Onset  Stroke Mother  Cancer Father Social History Substance Use Topics  Smoking status: Former Smoker  Smokeless tobacco: Never Used Comment: stopped 25 plus years ago  Alcohol use No  
   
Current Outpatient Prescriptions Medication Sig Dispense Refill  gabapentin (NEURONTIN) 600 mg tablet Take 1 Tab by mouth three (3) times daily. 100 Tab 11  
 CYANOCOBALAMIN, VITAMIN B-12, (VITAMIN B-12 PO) Take  by mouth daily.  aspirin delayed-release 81 mg tablet Take  by mouth daily.  CETIRIZINE HCL (ZYRTEC PO) Take  by mouth.  colestipol (COLESTID) 1 gram tablet Take 1 g by mouth two (2) times a day.  clonazePAM (KLONOPIN) 0.5 mg tablet 0.25 mg two (2) times a day.  KS-KQ-ES-Fe-Min-Lycopen-Lutein (CENTRUM) 0.4-162-18 mg tab Take  by mouth.  traZODone (DESYREL) 50 mg tablet Take 50 mg by mouth nightly.  donepezil (ARICEPT) 10 mg tablet Take 1 Tab by mouth daily. 90 Tab 3  
 dicyclomine (BENTYL) 10 mg capsule Take 10 mg by mouth four (4) times daily.  losartan (COZAAR) 100 mg tablet Take 100 mg by mouth daily.  sertraline (ZOLOFT) 100 mg tablet Take 100 mg by mouth every morning.  omeprazole (PRILOSEC) 40 mg capsule Take 40 mg by mouth daily.  budesonide-formoterol (SYMBICORT) 160-4.5 mcg/Actuation HFA inhaler Take 2 Puffs by inhalation as needed. Allergies Allergen Reactions  Adhesive Tape Other (comments)  
  hurts the area it is placed on Review of Systems: A comprehensive review of systems was negative except for: Musculoskeletal: positive for myalgias, arthralgias, stiff joints and back pain Neurological: positive for memory problems and tremor Behvioral/Psych: positive for anxiety and depression Objective: I 
 
 
NEUROLOGICAL EXAM: 
 
Appearance: The patient is well developed, well nourished, provides a fair history and is in no acute distress. Mental Status: Oriented to time, place and person and the president, but misses the day of the month. Her speech is 100% intelligible, and she has mild cognitive impairment because of her dementia. She can't remember only one of 3 words at 30 seconds with distraction, and cannot subtract serial sevens, or spell the word world backward, or draw a clock that shows a time 10 minutes to 11. Patient is a little slow mentally for some recent memory loss but actually functioning fairly well in her ADLs. Mood and affect slightly anxious and depressed. Cranial Nerves:   Intact visual fields. Fundi are benign. FRANK, EOM's full, no nystagmus, no ptosis. Facial sensation is normal. Corneal reflexes are not tested. Facial movement is symmetric. Hearing is abnormal bilaterally.  Palate is midline with normal sternocleidomastoid and trapezius muscles are normal. Tongue is midline. Neck without meningismus or bruits Motor:  4/5 strength in upper and lower proximal and distal muscles. Normal bulk and tone. No fasciculations. Patient has negative straight leg raising test negative in the sitting position but has percussion tenderness over the lumbar spine Patient had a spinal stimulator in the lumbar spine with stable surgical incisions Reflexes:   Deep tendon reflexes 1+/4 and symmetrical. No Babinski or clonus present Sensory:   Normal to touch, pinprick and DSS, and temperature and vibration mildly decreased in both lower extremities. Gait:  Abnormal gait for age, as she does move slowly due to arthritis in her back, walk with a limp on her right leg, and normally uses a cane for ambulation. Tremor:   Mild intention tremor noted. Cerebellar:  Mldly abnormal Romberg and tandem cerebellar signs present. Neurovascular:  Normal heart sounds and regular rhythm, peripheral pulses decreased in both feet, and no carotid bruits. Assessment:  
 
 
Plan:  
 
Because of her back pain, she will increase her Neurontin to 600 mg 3 times a day, and new prescription sent in for patient today. We encourage her to remain active and try to do her back exercises in addition and continue seeing her pain management physician Patient is encouraged to continue her vitamins, vitamin D, remain mentally and physically active, and start exercising more 35 minutes spent with patient and the daughter, and advised her that she has a progressive degenerative brain condition that will only get worse with time. Patient is to continue her clonazepam at the reduced dose one half tablet twice a day for her intestinal spasms CT of the head and metabolic parameters were all normal last visit 6 months ago Patient is to continue Neurontin 600 mg 3 times a day because of her neuralgic type pain, increasing fibromyalgia symptoms and increasing back pain and nerve root pain, for now. She was advised of the side effects as far as drowsiness, sedation, dizziness, edema and weight gain Patient is to continue her other medication and try to continue a regular exercise program 
Patient is to see her pain management specialist, for continued treatment and spinal cord stimulator followup Patient is to continue current medications and start exercising more We discussed with her and her daughter in detail, if there is any doubt about her driving ability she should stop immediately or get tested by [facility] Patient will be seen again in 6 months time or earlier as needed. She will call if any problem in the interim. Discuss her condition with patient and daughters at length,  
Time of visit was 35 minutes today She is to keep the lights on in the house at night and be careful when she moves or changes position quickly She will call if any problem in the interim. Signed By: Gabino Lemos MD   
 January 9, 2018 This note will not be viewable in [address] If you have questions, please do not hesitate to call me. I look forward to following Ms. Herman Neighbours along with you. Sincerely, Gabino Lemos MD

## 2018-01-09 NOTE — PATIENT INSTRUCTIONS

## 2018-01-09 NOTE — MR AVS SNAPSHOT
Visit Information Date & Time Provider Department Dept. Phone Encounter #  
 1/9/2018  3:00 PM Abdirahman Lopez MD [facility] at [facility] [phone] [id number] Follow-up Instructions Return in about 6 months (around 7/9/2018). Upcoming Health Maintenance Date Due DTaP/Tdap/Td series (1 - Tdap) 6/24/1955 ZOSTER VACCINE AGE 60> 4/24/1994 GLAUCOMA SCREENING Q2Y 6/24/1999 OSTEOPOROSIS SCREENING (DEXA) 6/24/1999 Pneumococcal 65+ Low/Medium Risk (1 of 2 - PCV13) 6/24/1999 MEDICARE YEARLY EXAM 6/24/1999 Influenza Age 5 to Adult 8/1/2017 Allergies as of 1/9/2018  Review Complete On: 1/9/2018 By: Abdirahman Lopez MD  
  
 Severity Noted Reaction Type Reactions Adhesive Tape Low 07/15/2010   Side Effect Other (comments)  
 hurts the area it is placed on  
  
Current Immunizations  Never Reviewed No immunizations on file. Not reviewed this visit You Were Diagnosed With   
  
 Codes Comments Lumbar radicular pain     ICD-10-CM: M54.16 
ICD-9-CM: 724.4 Benign essential tremor syndrome     ICD-10-CM: G25.0 ICD-9-CM: 333.1 Fibromyalgia     ICD-10-CM: M79.7 ICD-9-CM: 729.1 Ataxic gait     ICD-10-CM: R26.0 ICD-9-CM: 039. 2 Vitals BP Pulse Height(growth percentile) Weight(growth percentile) SpO2 BMI  
 148/66 96 5' 5\" (1.651 m) 166 lb (75.3 kg) 96% 27.62 kg/m2 Smoking Status Former Smoker BMI and BSA Data Body Mass Index Body Surface Area  
 27.62 kg/m 2 1.86 m 2 Preferred Pharmacy Pharmacy Name Phone [facility], [address] [place] [address] AT [address] [phone] Your Updated Medication List  
  
   
This list is accurate as of: 1/9/18  3:41 PM.  Always use your most recent med list.  
  
  
  
  
 aspirin delayed-release 81 mg tablet Take  by mouth daily. CENTRUM 0.4-162-18 mg Tab Generic drug:  HL-VS-TP-Fe-Min-Lycopen-Lutein Take  by mouth.  
  
 clonazePAM 0.5 mg tablet Commonly known as:  KlonoPIN  
0.25 mg two (2) times a day. COLESTID 1 gram tablet Generic drug:  colestipol Take 1 g by mouth two (2) times a day. dicyclomine 10 mg capsule Commonly known as:  BENTYL Take 10 mg by mouth four (4) times daily. donepezil 10 mg tablet Commonly known as:  ARICEPT Take 1 Tab by mouth daily. gabapentin 600 mg tablet Commonly known as:  NEURONTIN Take 1 Tab by mouth three (3) times daily. losartan 100 mg tablet Commonly known as:  COZAAR Take 100 mg by mouth daily. omeprazole 40 mg capsule Commonly known as:  PRILOSEC Take 40 mg by mouth daily. sertraline 100 mg tablet Commonly known as:  ZOLOFT Take 100 mg by mouth every morning. SYMBICORT 160-4.5 mcg/actuation Hfaa Generic drug:  budesonide-formoterol Take 2 Puffs by inhalation as needed. traZODone 50 mg tablet Commonly known as:  Sherrlyn Pali Take 50 mg by mouth nightly. VITAMIN B-12 PO Take  by mouth daily. ZYRTEC PO Take  by mouth. Prescriptions Sent to Pharmacy Refills  
 gabapentin (NEURONTIN) 600 mg tablet 11 Sig: Take 1 Tab by mouth three (3) times daily. Class: Normal  
 Pharmacy: [facility] [address] Ph #: [phone] Route: Oral  
  
Follow-up Instructions Return in about 6 months (around 7/9/2018). Patient Instructions A Healthy Lifestyle: Care Instructions Your Care Instructions A healthy lifestyle can help you feel good, stay at a healthy weight, and have plenty of energy for both work and play. A healthy lifestyle is something you can share with your whole family. A healthy lifestyle also can lower your risk for serious health problems, such as high blood pressure, heart disease, and diabetes. You can follow a few steps listed below to improve your health and the health of your family. Follow-up care is a key part of your treatment and safety. Be sure to make and go to all appointments, and call your doctor if you are having problems. It's also a good idea to know your test results and keep a list of the medicines you take. How can you care for yourself at home? · Do not eat too much sugar, fat, or fast foods. You can still have dessert and treats now and then. The goal is moderation. · Start small to improve your eating habits. Pay attention to portion sizes, drink less juice and soda pop, and eat more fruits and vegetables. ¨ Eat a healthy amount of food. A 3-ounce serving of meat, for example, is about the size of a deck of cards. Fill the rest of your plate with vegetables and whole grains. ¨ Limit the amount of soda and sports drinks you have every day. Drink more water when you are thirsty. ¨ Eat at least 5 servings of fruits and vegetables every day. It may seem like a lot, but it is not hard to reach this goal. A serving or helping is 1 piece of fruit, 1 cup of vegetables, or 2 cups of leafy, raw vegetables. Have an apple or some carrot sticks as an afternoon snack instead of a candy bar. Try to have fruits and/or vegetables at every meal. 
· Make exercise part of your daily routine. You may want to start with simple activities, such as walking, bicycling, or slow swimming. Try to be active 30 to 60 minutes every day. You do not need to do all 30 to 60 minutes all at once. For example, you can exercise 3 times a day for 10 or 20 minutes. Moderate exercise is safe for most people, but it is always a good idea to talk to your doctor before starting an exercise program. 
· Keep moving. Bernadine Moulding the lawn, work in the garden, or [employer]. Take the stairs instead of the elevator at work. · If you smoke, quit.  People who smoke have an increased risk for heart attack, stroke, cancer, and other lung illnesses. Quitting is hard, but there are ways to boost your chance of quitting tobacco for good. ¨ Use nicotine gum, patches, or lozenges. ¨ Ask your doctor about stop-smoking programs and medicines. ¨ Keep trying. In addition to reducing your risk of diseases in the future, you will notice some benefits soon after you stop using tobacco. If you have shortness of breath or asthma symptoms, they will likely get better within a few weeks after you quit. · Limit how much alcohol you drink. Moderate amounts of alcohol (up to 2 drinks a day for men, 1 drink a day for women) are okay. But drinking too much can lead to liver problems, high blood pressure, and other health problems. Family health If you have a family, there are many things you can do together to improve your health. · Eat meals together as a family as often as possible. · Eat healthy foods. This includes fruits, vegetables, lean meats and dairy, and whole grains. · Include your family in your fitness plan. Most people think of activities such as jogging or tennis as the way to fitness, but there are many ways you and your family can be more active. Anything that makes you breathe hard and gets your heart pumping is exercise. Here are some tips: 
¨ Walk to do errands or to take your child to school or the bus. ¨ Go for a family bike ride after dinner instead of watching TV. Where can you learn more? Go to http://martha-giuseppe.info/. Enter A979 in the search box to learn more about \"A Healthy Lifestyle: Care Instructions. \" Current as of: May 12, 2017 Content Version: 11.4 © 3202-8714 [employer]. Care instructions adapted under license by [employer] (which disclaims liability or warranty for this information).  If you have questions about a medical condition or this instruction, always ask your healthcare professional. Danieal Le[employer] disclaims any warranty or liability for your use of this information. Introducing [facility]! Dear Domenic Lizama: Thank you for requesting a Vit[employer] account. Our records indicate that you already have an active Vitrue account. You can access your account anytime at [email or web address] Did you know that you can access your hospital and ER discharge instructions at any time in Vitrue? You can also review all of your test results from your hospital stay or ER visit. Additional Information If you have questions, please visit the Frequently Asked Questions section of the Vitrue website at [email or web address] Remember, Vitrue is NOT to be used for urgent needs. For medical emergencies, dial 911. Now available from your iPhone and Android! Please provide this summary of care documentation to your next provider. Your primary care clinician is listed as Tan Oconnell III. If you have any questions after today's visit, please call [phone].

## 2018-01-10 NOTE — PROGRESS NOTES
Consult    Subjective:     Link Montiel is a 80 y. o.right-handed  female seen today at the request of Dr. Elder Prasad for evaluation of new problem of increasing lower back pain that began in early December, and was severe, and she saw her pain management physician who increased her Neurontin to 600 mg from 300 mg 3 times a day, and that has finally over the last several days just started to provide relief for her back pain. She is trying to avoid repeat steroid injections and nerve blocks in her back. Since that is helping we told her she needs to continue that dose but she has no refill so we gave her refills for that medication. She has moved in with her daughter and her physical health seems to have taken a marked turn for the better. She seems less depressed and much more with it, and does not seem to have his may behavioral problems as much trouble with her memory. She continues to take the Aricept, does not think she needs any new medication. Her daughter feels that she is stable also. She has had no more falls, and 6 months ago she had gait instability, and 4 falls over the last 6-9 months, and increasing difficulty with some memory loss and increasing confusion noted by her physical therapist.  That all seems to be somewhat better. The patient's family was concerned about her taking too much medication, remain to taper her clonazepam down to one half a 0.5 mg tablet twice a day she is seems to be able to control her intestinal spasms with that and is doing much better. 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 She has known dementia and I advised the family to expect continued worsening of her memory loss with that. She is already on Aricept, and does not want to consider adding new medication.   She denies any headache, any increased neck pain, but does have chronic low back pain and post lumbar laminectomy syndrome and is followed by pain management physician with intermittent injections in her spine. This may be also contributing to her gait instability. She had a normal CT of the head 6 months ago because of her ataxia, and did physical therapy. She still drives and has not had an accident but only drives locally. She feels no problem doing it. She has also seen for increasing back pain and has spinal cord stimulator insertion for post lumbar laminectomy syndrome. Patient had spinal cord stimulator inserted in April 2016, and is still recovering slowly, and she see her pain management doctor for increasing back pain. She has weakness in her legs and difficulty walking, and she had an MRI scan of the lumbar spine apparently shows a disc and spinal stenosis. She doesn't seem to have much new weakness in the legs, and her bowel and bladder function is stable except for increasing difficulty emptying her bladder. She is seeing a urologist. Because of her increased pain she sees Dr. Kali Pearson, her pain management specialist, who has treated her in the past.  She is not exercising like she is supposed too,but she says she will do it on her own now. She had previous back surgery and has a postlaminectomy syndrome, and now a new lumbar disc and spinal stenosis. Besides the weakness in her legs, she's had no other new weakness, sensory loss visual changes or headaches, or other new neurological problems except for the neurogenic bladder. She is using a cane or walker for ambulation because of her back pain. She Has had no other new medical problems complications or illnesses except for a bout of diverticulitis. She is on Aricept 10 mg a day and is on clonazepam .5 mg each day. She no longer uses Ambien for sleep and takes vitamins regularly.  Her past medical history is pertinent for hypertension, depression, GERD, asthma, arthritis, post laminectomy syndrome, lumbar radiculopathy, a central tremor, dementia, ataxic gait, cataract surgery, cervical fusion, right carpal tunnel syndrome, post cervical laminectomy fusion. Hysterectomy and bilateral salpingo-oophorectomy, gallbladder surgery, right carpal tunnel surgery. Past Medical History:   Diagnosis Date    Arthritis     Asthma     Chronic pain     GERD (gastroesophageal reflux disease)     Hypertension     Vertigo       Past Surgical History:   Procedure Laterality Date    HX APPENDECTOMY      HX BACK SURGERY  05/21/2015    stimulator Medtronic     HX CATARACT REMOVAL      bilateral    HX CERVICAL FUSION      anterior disc fusion    HX HEENT      uppp    HX LAP CHOLECYSTECTOMY      HX ORTHOPAEDIC      right carpal tunnel release    HX MATT AND BSO       Family History   Problem Relation Age of Onset    Stroke Mother     Cancer Father       Social History   Substance Use Topics    Smoking status: Former Smoker    Smokeless tobacco: Never Used      Comment: stopped 25 plus years ago    Alcohol use No       Current Outpatient Prescriptions   Medication Sig Dispense Refill    gabapentin (NEURONTIN) 600 mg tablet Take 1 Tab by mouth three (3) times daily. 100 Tab 11    CYANOCOBALAMIN, VITAMIN B-12, (VITAMIN B-12 PO) Take  by mouth daily.  aspirin delayed-release 81 mg tablet Take  by mouth daily.  CETIRIZINE HCL (ZYRTEC PO) Take  by mouth.  colestipol (COLESTID) 1 gram tablet Take 1 g by mouth two (2) times a day.  clonazePAM (KLONOPIN) 0.5 mg tablet 0.25 mg two (2) times a day.  PM-AD-EL-Fe-Min-Lycopen-Lutein (CENTRUM) 0.4-162-18 mg tab Take  by mouth.  traZODone (DESYREL) 50 mg tablet Take 50 mg by mouth nightly.  donepezil (ARICEPT) 10 mg tablet Take 1 Tab by mouth daily. 90 Tab 3    dicyclomine (BENTYL) 10 mg capsule Take 10 mg by mouth four (4) times daily.  losartan (COZAAR) 100 mg tablet Take 100 mg by mouth daily.  sertraline (ZOLOFT) 100 mg tablet Take 100 mg by mouth every morning.       omeprazole (PRILOSEC) 40 mg capsule Take 40 mg by mouth daily.  budesonide-formoterol (SYMBICORT) 160-4.5 mcg/Actuation HFA inhaler Take 2 Puffs by inhalation as needed. Allergies   Allergen Reactions    Adhesive Tape Other (comments)     hurts the area it is placed on        Review of Systems:  A comprehensive review of systems was negative except for: Musculoskeletal: positive for myalgias, arthralgias, stiff joints and back pain  Neurological: positive for memory problems and tremor  Behvioral/Psych: positive for anxiety and depression     Objective:     I      NEUROLOGICAL EXAM:    Appearance: The patient is well developed, well nourished, provides a fair history and is in no acute distress. Mental Status: Oriented to time, place and person and the president, but misses the day of the month. Her speech is 100% intelligible, and she has mild cognitive impairment because of her dementia. She can't remember only one of 3 words at 30 seconds with distraction, and cannot subtract serial sevens, or spell the word world backward, or draw a clock that shows a time 10 minutes to 11. Patient is a little slow mentally for some recent memory loss but actually functioning fairly well in her ADLs. Mood and affect slightly anxious and depressed. Cranial Nerves:   Intact visual fields. Fundi are benign. FRANK, EOM's full, no nystagmus, no ptosis. Facial sensation is normal. Corneal reflexes are not tested. Facial movement is symmetric. Hearing is abnormal bilaterally. Palate is midline with normal sternocleidomastoid and trapezius muscles are normal. Tongue is midline. Neck without meningismus or bruits   Motor:  4/5 strength in upper and lower proximal and distal muscles. Normal bulk and tone. No fasciculations.   Patient has negative straight leg raising test negative in the sitting position but has percussion tenderness over the lumbar spine  Patient had a spinal stimulator in the lumbar spine with stable surgical incisions   Reflexes:   Deep tendon reflexes 1+/4 and symmetrical. No Babinski or clonus present   Sensory:   Normal to touch, pinprick and DSS, and temperature and vibration mildly decreased in both lower extremities. Gait:  Abnormal gait for age, as she does move slowly due to arthritis in her back, walk with a limp on her right leg, and normally uses a cane for ambulation. Tremor:   Mild intention tremor noted. Cerebellar:  Mldly abnormal Romberg and tandem cerebellar signs present. Neurovascular:  Normal heart sounds and regular rhythm, peripheral pulses decreased in both feet, and no carotid bruits. Assessment:       Plan:     Because of her back pain, she will increase her Neurontin to 600 mg 3 times a day, and new prescription sent in for patient today. We encourage her to remain active and try to do her back exercises in addition and continue seeing her pain management physician  Patient is encouraged to continue her vitamins, vitamin D, remain mentally and physically active, and start exercising more  35 minutes spent with patient and the daughter, and advised her that she has a progressive degenerative brain condition that will only get worse with time. Patient is to continue her clonazepam at the reduced dose one half tablet twice a day for her intestinal spasms  CT of the head and metabolic parameters were all normal last visit 6 months ago  Patient is to continue Neurontin 600 mg 3 times a day because of her neuralgic type pain, increasing fibromyalgia symptoms and increasing back pain and nerve root pain, for now.   She was advised of the side effects as far as drowsiness, sedation, dizziness, edema and weight gain  Patient is to continue her other medication and try to continue a regular exercise program  Patient is to see her pain management specialist, for continued treatment and spinal cord stimulator followup  Patient is to continue current medications and start exercising more We discussed with her and her daughter in detail, if there is any doubt about her driving ability she should stop immediately or get tested by [facility]  Patient will be seen again in 6 months time or earlier as needed. She will call if any problem in the interim. Discuss her condition with patient and daughters at length,   Time of visit was 35 minutes today  She is to keep the lights on in the house at night and be careful when she moves or changes position quickly  She will call if any problem in the interim. Signed By: Rick Quintanilla MD     January 9, 2018       This note will not be viewable in [address]

## 2018-05-16 ENCOUNTER — HOSPITAL ENCOUNTER (OUTPATIENT)
Dept: GENERAL RADIOLOGY | Age: 83
Discharge: HOME | End: 2018-05-16
Attending: INTERNAL MEDICINE
Payer: MEDICARE

## 2018-05-16 DIAGNOSIS — R13.12 OROPHARYNGEAL DYSPHAGIA: ICD-10-CM

## 2018-05-16 DIAGNOSIS — R13.10 DYSPHAGIA: ICD-10-CM

## 2018-05-16 PROCEDURE — 74230 X-RAY XM SWLNG FUNCJ C+: CPT

## 2018-05-16 PROCEDURE — G8996 SWALLOW CURRENT STATUS: HCPCS

## 2018-05-16 PROCEDURE — G8998 SWALLOW D/C STATUS: HCPCS

## 2018-05-16 PROCEDURE — 92611 MOTION FLUOROSCOPY/SWALLOW: CPT

## 2018-05-16 PROCEDURE — G8997 SWALLOW GOAL STATUS: HCPCS

## 2018-05-16 NOTE — PROGRESS NOTES
[address] [place] [address]    Speech Pathology Modified barium swallow Study  Patient: Norma Melgar (24 y.o. female)  Date: 5/16/2018  Referring Provider:  Dr. Teri Hardin:   Patient alert, appropriate. Accompanied by daughter, Sherri Cain, and both provided case history. Patient reported she will get \"strangled\" on anything, including liquids, solids, pills, and her saliva. This occurs approximately 1x/week and began \"a long time ago. \" Patient will also get coughing spells unrelated to PO intake and choke due to her coughing spells. PMH includes asthma, allergies, dementia, reflux, ACDF, and \"mini-strokes. \" Eats regular/thin liquid diet with meds whole in thin liquid. Patient reported completing bedside swallowing evaluation in the past, however swallow function is reportedly worse now. OBJECTIVE:   Past Medical History:   Past Medical History:   Diagnosis Date    Arthritis     Asthma     Chronic pain     GERD (gastroesophageal reflux disease)     Hypertension     Vertigo      Past Surgical History:   Procedure Laterality Date    HX APPENDECTOMY      HX BACK SURGERY  05/21/2015    stimulator Medtronic     HX CATARACT REMOVAL      bilateral    HX CERVICAL FUSION      anterior disc fusion    HX HEENT      uppp    HX LAP CHOLECYSTECTOMY      HX ORTHOPAEDIC      right carpal tunnel release    HX MATT AND BSO       Current Dietary Status:  Regular/thin  Radiologist: Dr. Dalal Luria: Lateral;Fluoro  Patient Position: upright in hausted chair    Trial 1:   Consistency Presented: Thin liquid; Nectar thick liquid;Puree; Solid;Pill/Tablet   How Presented: Spoon;SLP-fed/presented;Self-fed/presented;Straw;Successive swallows;Cup/sip;Cup/gulp       Bolus Acceptance: No impairment   Bolus Formation/Control: Impaired: Premature spillage;Delayed   Propulsion: Delayed (# of seconds)   Oral Residue: Lingual (collection, passively spilled to pharynx)   Initiation of Swallow: Triggered at vallecula;Triggered at pyriform sinus(es)   Timing: No impairment;Pooling 1-5 sec   Penetration: None   Aspiration/Timing: Before (x1 with thin)   Pharyngeal Clearance: Vallecular residue;Pyriform residue  (trace)                       Decreased Tongue Base Retraction?: No (WFL)  Laryngeal Elevation: WFL (within functional limits)  Aspiration/Penetration Score: 6 (Aspiration-Contrast passes below the cords/glottis, and is cleared)   Pharyngeal Symmetry: Not assessed  Pharyngeal-Esophageal Segment: No impairment  Pharyngeal Dysfunction: Decreased pharyngeal wall constriction    Oral Phase Severity: Mild  Pharyngeal Phase Severity: Mild     The Modified Barium Swallow Impairment Profile: [id number]  ORAL Impairment  Component 1--Lip Closure: could not assess  Component 2--Tongue Control During Bolus Hold: 0=Cohesive bolus between tongue to palatal seal  Component 3--Bolus Preparation/Mastication: 2=Disorganized chewing/mashing with solid pieces of bolus unchewed  Component 4--Bolus Transport/Lingual Motion: 1=Delayed initiation of tongue motion  Component 5--Oral Residue: 2=Residue collection on oral structures  Component 6--Initiation of Pharyngeal Swallow: 3=Bolus head in pyriforms    PHARYNGEAL Impairment  Component 7--Soft Palate Elevation: 0=No bolus between soft palate/pharyngeal wall  Component 8--Laryngeal Elevation: 0=Complete superior movement of thyroid cartilage with complete approximation of arytenoids to epiglottic petiole  Component 9--Anterior Hyoid Excursion: 0=Complete anterior movement  Component 10--Epiglottic Movement: 0=Complete inversion  Component 11--Laryngeal Vestibular Closure: 0=Complete; no air/contrast in laryngeal vestibule  Component 12--Pharyngeal Stripping Wave: 1=Present - diminished  Component 13--Pharyngeal Contraction: could not assess  Component 14--Pharyngoesophageal Segment Openin=Complete distension and complete duration; no obstruction of flow  Component 15--Tongue Base Retraction: 1=Trace column of contrast or air between tongue base and pharyngeal wall  Component 16--Pharyngeal Residue: 1=Trace residue within or on pharyngeal structures    ESOPHAGEAL Impairment  Component 17--Esophageal Clearance Upright Position: could not assess      In compliance with CMSs Claims Based Outcome Reporting, the following G-code set was chosen for this patient based the use of the NOMS functional outcome to quantify this patient's level of swallowing impairment. Using the NOMS, the patient was determined to be at level 5 for swallow function which correlates with the CJ= 20-39% level of severity. Based on the objective assessment provided within this note, the current, goal, and discharge g-codes are as follows:    Swallow  Swallowing:   Swallow Current Status CJ= 20-39%   Swallow Goal Status CJ= 20-39%   Swallow D/C Status CJ= 20-39%        NOMS Swallowing Levels:  Level 1 (CN): NPO  Level 2 (CM): NPO but takes consistency in therapy  Level 3 (CL): Takes less than 50% of nutrition p.o. and continues with nonoral feedings; and/or safe with mod cues; and/or max diet restriction  Level 4 (CK): Safe swallow but needs mod cues; and/or mod diet restriction; and/or still requires some nonoral feeding/supplements  Level 5 (CJ): Safe swallow with min diet restriction; and/or needs min cues  Level 6 (CI): Independent with p.o.; rare cues; usually self cues; may need to avoid some foods or needs extra time  Level 7 ([address] [place] [address]): Independent for all p.o.  SHARMIN. (2003). National Outcomes Measurement System (NOMS): Adult Speech-Language Pathology User's Guide. ASSESSMENT :  Based on the objective data described above, the patient presents with mild oropharyngeal dysphagia. Oral dysphagia is characterized by premature spillage and collection of lingual residue that passively spilled into pharynx.  Pharyngeal dysphagia is characterized by mildly delayed swallow initiation at the vallecula or pyriform sinus. Patient with aspiration of thin liquids before the swallow secondary to premature spillage x1. Aspiration elicited delayed throat clear that cleared the aspiration from the trachea to the vocal cords. Cued cough was effective in clearing aspiration from the laryngeal vestibule. No aspiration observed with additional thin liquid trials or nectar liquids. PLAN/RECOMMENDATIONS :  --Regular/thin liquid diet with general aspiration precautions, including small, single bites and sips, slow rate, eliminate distractions with PO intake. Given patient without PNA or complaints of SOB, she is obviously tolerating the small amount of aspiration. Do not recommend altering liquid consistency at this time for this reason  --Monitor closely for any s/s aspiration or aspiration PNA, including increased SOB, fatigue, or fever. If noted, may need re-evaluation of swallow function and additional modifications  --Meds whole in puree  --No further SLP treatment indicated at this time     COMMUNICATION/EDUCATION:   The above findings and recommendations were discussed with: patient and daughter who verbalized understanding.     Thank you for this referral.  Valarie Jaramillo SLP  Time Calculation: 30 mins

## 2018-07-06 ENCOUNTER — OFFICE VISIT (OUTPATIENT)
Dept: NEUROLOGY | Age: 83
End: 2018-07-06

## 2018-07-06 VITALS
SYSTOLIC BLOOD PRESSURE: 120 MMHG | OXYGEN SATURATION: 95 % | DIASTOLIC BLOOD PRESSURE: 66 MMHG | BODY MASS INDEX: 27.16 KG/M2 | HEIGHT: 65 IN | WEIGHT: 163 LBS | HEART RATE: 85 BPM | RESPIRATION RATE: 12 BRPM | TEMPERATURE: 98 F

## 2018-07-06 DIAGNOSIS — R26.0 ATAXIC GAIT: ICD-10-CM

## 2018-07-06 DIAGNOSIS — F02.80 DEMENTIA IN ALZHEIMER'S DISEASE WITH EARLY ONSET WITHOUT BEHAVIORAL DISTURBANCE (HCC): ICD-10-CM

## 2018-07-06 DIAGNOSIS — M48.061 SPINAL STENOSIS, LUMBAR: ICD-10-CM

## 2018-07-06 DIAGNOSIS — G30.1 ALZHEIMER'S TYPE DEMENTIA WITH LATE ONSET WITHOUT BEHAVIORAL DISTURBANCE (HCC): ICD-10-CM

## 2018-07-06 DIAGNOSIS — R13.12 OROPHARYNGEAL DYSPHAGIA: ICD-10-CM

## 2018-07-06 DIAGNOSIS — I65.23 STENOSIS OF BOTH INTERNAL CAROTID ARTERIES: Primary | ICD-10-CM

## 2018-07-06 DIAGNOSIS — G30.0 DEMENTIA IN ALZHEIMER'S DISEASE WITH EARLY ONSET WITHOUT BEHAVIORAL DISTURBANCE (HCC): ICD-10-CM

## 2018-07-06 DIAGNOSIS — M79.7 FIBROMYALGIA: ICD-10-CM

## 2018-07-06 DIAGNOSIS — M96.1 LUMBAR POST-LAMINECTOMY SYNDROME: ICD-10-CM

## 2018-07-06 DIAGNOSIS — M54.16 LUMBAR RADICULAR PAIN: ICD-10-CM

## 2018-07-06 DIAGNOSIS — F02.80 LATE ONSET ALZHEIMER'S DISEASE WITHOUT BEHAVIORAL DISTURBANCE (HCC): ICD-10-CM

## 2018-07-06 DIAGNOSIS — E03.4 HYPOTHYROIDISM DUE TO ACQUIRED ATROPHY OF THYROID: ICD-10-CM

## 2018-07-06 DIAGNOSIS — M48.061 SPINAL STENOSIS OF LUMBAR REGION WITHOUT NEUROGENIC CLAUDICATION: ICD-10-CM

## 2018-07-06 DIAGNOSIS — I67.89 CEREBRAL MICROVASCULAR DISEASE: ICD-10-CM

## 2018-07-06 DIAGNOSIS — F02.80 ALZHEIMER'S TYPE DEMENTIA WITH LATE ONSET WITHOUT BEHAVIORAL DISTURBANCE (HCC): ICD-10-CM

## 2018-07-06 DIAGNOSIS — G30.1 LATE ONSET ALZHEIMER'S DISEASE WITHOUT BEHAVIORAL DISTURBANCE (HCC): ICD-10-CM

## 2018-07-06 PROBLEM — R13.10 DYSPHAGIA: Status: ACTIVE | Noted: 2018-07-06

## 2018-07-06 RX ORDER — BUSPIRONE HYDROCHLORIDE 10 MG/1
10 TABLET ORAL
Qty: 100 TAB | Refills: 11 | Status: SHIPPED | OUTPATIENT
Start: 2018-07-06 | End: 2019-08-16 | Stop reason: ALTCHOICE

## 2018-07-06 RX ORDER — GABAPENTIN 250 MG/5ML
300 SOLUTION ORAL 3 TIMES DAILY
Qty: 600 ML | Refills: 11 | Status: SHIPPED | OUTPATIENT
Start: 2018-07-06 | End: 2018-08-07 | Stop reason: DRUGHIGH

## 2018-07-06 RX ORDER — CLONAZEPAM 0.5 MG/1
0.25 TABLET ORAL
Qty: 30 TAB | Refills: 11
Start: 2018-07-06 | End: 2019-09-17 | Stop reason: ALTCHOICE

## 2018-07-06 RX ORDER — FLUTICASONE PROPIONATE AND SALMETEROL 232; 14 UG/1; UG/1
POWDER, METERED RESPIRATORY (INHALATION)
Refills: 12 | COMMUNITY
Start: 2018-05-07 | End: 2019-11-16

## 2018-07-06 RX ORDER — ALBUTEROL SULFATE 90 UG/1
POWDER, METERED RESPIRATORY (INHALATION)
Refills: 6 | COMMUNITY
Start: 2018-06-03 | End: 2020-02-21 | Stop reason: ALTCHOICE

## 2018-07-06 RX ORDER — MONTELUKAST SODIUM 10 MG/1
TABLET ORAL
Refills: 3 | COMMUNITY
Start: 2018-06-03

## 2018-07-06 NOTE — PROGRESS NOTES
Consult    Subjective:     Maricarmen Naranjo is a 80 y. o.right-handed  female seen today at the request of Dr. Perri Johnson for evaluation of new problem of increasing difficulty with swallowing, and several episodes of choking and possible aspiration and an abnormal barium swallow that showed that she has slight penetration on thin liquids, but handles thickened liquids well. She is going to see speech therapy, for further evaluation for that and order has been placed and she will be seen shortly. In addition she is also had a problem with some anxiety at times, and is trying to be tapered off her clonazepam because of her age and the adverse events of that causing doubling the risk of death. She is down to half a tablet twice a day which will go down to half a tablet once a day, and we will give her BuSpar to take 3 times a day as needed for the anxiety. We will see if that can help control her symptoms of her GI side effects for which she takes the clonazepam.  At that does not work we told her to check with her GI doctor about other medications. She had a previous history of TIAs, and has not had a Doppler study in 2 years, so we reordered that in addition to her carotid stenosis. Her PCP is monitoring her blood pressure, cholesterol, and she does not smoke, and she try to watch her diet. She takes aspirin daily. She is on blood pressure pills and cholesterol medications. She has chronic back pain, and is under pain management. She is also trying to come down on her gabapentin, with the feeling that but some of her medication may be causing her not to swallow so well. We will cut her down to 300 mg 3 times a day and gave her a liquid form since she can swallow that better than the pill form and the 600 mg tablet which is large. We discussed her condition with the patient and her daughter at length.   She has moved in with her daughter and her physical health seems to have taken a marked turn for the better. She seems less depressed and much more with it, and does not seem to have his may behavioral problems as much trouble with her memory. She continues to take the Aricept, does not think she needs any new medication. Her daughter feels that she needs medication for her behavior and agitation. We will try the BuSpar. She continues her vitamins and vitamin D on a regular basis. She also takes B12. .  She has had no more falls, and continues a regular exercise program.  That all seems to be somewhat better. The patient's family was concerned about her taking too much medication, and we will make the changes in the clonazepam and Neurontin as above. She denies any headache, any increased neck pain, but does have chronic low back pain and post lumbar laminectomy syndrome and is followed by pain management physician with intermittent injections in her spine. This may be also contributing to her gait instability. She had a normal CT of the head 12 months ago because of her ataxia, and did physical therapy. She still drives and has not had an accident but only drives locally. She feels no problem doing it. She has also seen for increasing back pain and has spinal cord stimulator insertion for post lumbar laminectomy syndrome. Patient had spinal cord stimulator inserted in April 2016, and is still recovering slowly, and she see her pain management doctor for increasing back pain. She has weakness in her legs and difficulty walking, and she had an MRI scan of the lumbar spine apparently shows a disc and spinal stenosis. She doesn't seem to have much new weakness in the legs, and her bowel and bladder function is stable except for increasing difficulty emptying her bladder. She is seeing a urologist. Because of her increased pain she sees Dr. Juan Shaw, her pain management specialist, who has treated her in the past.  She is not exercising like she is supposed too,but she says she will do it on her own now.  She had previous back surgery and has a postlaminectomy syndrome, and now a new lumbar disc and spinal stenosis. Besides the weakness in her legs, she's had no other new weakness, sensory loss visual changes or headaches, or other new neurological problems except for the neurogenic bladder. She is using a cane or walker for ambulation because of her back pain. She Has had no other new medical problems complications or illnesses except for a bout of diverticulitis. She is on Aricept 10 mg a day and is on clonazepam .5 mg each day. She no longer uses Ambien for sleep and takes vitamins regularly. Her past medical history is pertinent for hypertension, depression, GERD, asthma, arthritis, post laminectomy syndrome, lumbar radiculopathy, a central tremor, dementia, ataxic gait, cataract surgery, cervical fusion, right carpal tunnel syndrome, post cervical laminectomy fusion. Hysterectomy and bilateral salpingo-oophorectomy, gallbladder surgery, right carpal tunnel surgery.     Past Medical History:   Diagnosis Date    Arthritis     Asthma     Chronic pain     GERD (gastroesophageal reflux disease)     Hypertension     Vertigo       Past Surgical History:   Procedure Laterality Date    HX APPENDECTOMY      HX BACK SURGERY  05/21/2015    stimulator [employer]     HX CATARACT REMOVAL      bilateral    HX CERVICAL FUSION      anterior disc fusion    HX HEENT      uppp    HX LAP CHOLECYSTECTOMY      HX ORTHOPAEDIC      right carpal tunnel release    HX MATT AND BSO       Family History   Problem Relation Age of Onset    Stroke Mother     Cancer Father       Social History   Substance Use Topics    Smoking status: Former Smoker    Smokeless tobacco: Never Used      Comment: stopped 25 plus years ago    Alcohol use No       Current Outpatient Prescriptions   Medication Sig Dispense Refill    PROAIR RESPICLICK 90 mcg/actuation aepb INL 2 PFS PO Q 4 H PRN  6    fluticasone-salmeterol 232-14 mcg/actuation aepb INL 2 PFS PO BID  12    montelukast (SINGULAIR) 10 mg tablet TK 1 T PO D  3    gabapentin (NEURONTIN) 250 mg/5 mL solution Take 6 mL by mouth three (3) times daily. 600 mL 11    busPIRone (BUSPAR) 10 mg tablet Take 1 Tab by mouth three (3) times daily (after meals). 100 Tab 11    clonazePAM (KLONOPIN) 0.5 mg tablet Take 0.5 Tabs by mouth nightly. Max Daily Amount: 0.25 mg. 30 Tab 11    CYANOCOBALAMIN, VITAMIN B-12, (VITAMIN B-12 PO) Take  by mouth daily.  aspirin delayed-release 81 mg tablet Take  by mouth daily.  colestipol (COLESTID) 1 gram tablet Take 1 g by mouth two (2) times a day.  SX-IV-KJ-Fe-Min-Lycopen-Lutein (CENTRUM) 0.4-162-18 mg tab Take  by mouth.  traZODone (DESYREL) 50 mg tablet Take 50 mg by mouth nightly.  donepezil (ARICEPT) 10 mg tablet Take 1 Tab by mouth daily. 90 Tab 3    dicyclomine (BENTYL) 10 mg capsule Take 10 mg by mouth four (4) times daily.  losartan (COZAAR) 100 mg tablet Take 100 mg by mouth daily.  sertraline (ZOLOFT) 100 mg tablet Take 100 mg by mouth every morning.  omeprazole (PRILOSEC) 40 mg capsule Take 40 mg by mouth daily.  budesonide-formoterol (SYMBICORT) 160-4.5 mcg/Actuation HFA inhaler Take 2 Puffs by inhalation as needed. Allergies   Allergen Reactions    Adhesive Tape Other (comments)     hurts the area it is placed on        Review of Systems:  A comprehensive review of systems was negative except for: Musculoskeletal: positive for myalgias, arthralgias, stiff joints and back pain  Neurological: positive for memory problems and tremor  Behvioral/Psych: positive for anxiety and depression     Objective:     I      NEUROLOGICAL EXAM:    Appearance: The patient is well developed, well nourished, provides a fair history and is in no acute distress. Mental Status: Oriented to time, place and person and the president, but misses the day of the month.  Her speech is 100% intelligible, and she has mild cognitive impairment because of her dementia. She can't remember only one of 3 words at 30 seconds with distraction, and cannot subtract serial sevens, or spell the word world backward, or draw a clock that shows a time 10 minutes to 11. Patient is a little slow mentally for some recent memory loss but actually functioning fairly well in her ADLs. Mood and affect slightly anxious and depressed. Cranial Nerves:   Intact visual fields. Fundi are benign. FRANK, EOM's full, no nystagmus, no ptosis. Facial sensation is normal. Corneal reflexes are not tested. Facial movement is symmetric. Hearing is abnormal bilaterally. Palate is midline with normal sternocleidomastoid and trapezius muscles are normal. Tongue is midline. Neck without meningismus or bruits  Temporal arteries are not tender or enlarged   Motor:  4/5 strength in upper and lower proximal and distal muscles. Normal bulk and tone. No fasciculations. Patient has negative straight leg raising test negative in the sitting position but has percussion tenderness over the lumbar spine  Patient had a spinal stimulator in the lumbar spine with stable surgical incisions   Reflexes:   Deep tendon reflexes 1+/4 and symmetrical. No Babinski or clonus present   Sensory:   Normal to touch, pinprick and DSS, and temperature and vibration mildly decreased in both lower extremities. Gait:  Abnormal gait for age, as she does move slowly due to arthritis in her back, walk with a limp on her right leg, and normally uses a cane for ambulation. Tremor:   Mild intention tremor noted. Cerebellar:  Mldly abnormal Romberg and tandem cerebellar signs present. Neurovascular:  Normal heart sounds and regular rhythm, peripheral pulses decreased in both feet, and no carotid bruits. Assessment:       Plan:     Patient with no carotid Doppler study in 2 years, will get repeat Doppler study next week. She has a history of TIAs.   We discussed her condition with the patient and her daughter in detail. Patient with possible too much medication causing some difficulty with swallowing, we will decrease her Neurontin, put her on liquid form to help, and decrease her clonazepam just a half a tablet at nighttime only and give her BuSpar during the daytime. Because of her back pain, she will continue her Neurontin to 300 mg 3 times a day, and new prescription sent in for patient today liquid form. We encourage her to remain active and try to do her back exercises in addition and continue seeing her pain management physician  Patient is encouraged to continue her vitamins, vitamin D, remain mentally and physically active, and start exercising more  35 minutes spent with patient and the daughter, and advised her that she has a progressive degenerative brain condition that will only get worse with time. Patient is to continue her clonazepam at the reduced dose one half tablet twice a day for her intestinal spasms  CT of the head and metabolic parameters were all normal last visit 6 months ago  Patient is to continue her other medication and try to continue a regular exercise program  Patient is to see her pain management specialist, for continued treatment and spinal cord stimulator followup  Patient is to continue current medications and start exercising more   We discussed with her and her daughter in detail, if there is any doubt about her driving ability she should stop immediately or get tested by [facility]  Patient will be seen again in 6 months time or earlier as needed. She will call if any problem in the interim. Discuss her condition with patient and daughters at length,   Time of visit was 35 minutes today  She is to keep the lights on in the house at night and be careful when she moves or changes position quickly  She will call if any problem in the interim. Signed By: Annie Jerome MD     July 6, 2018       This note will not be viewable in [address]

## 2018-07-06 NOTE — PATIENT INSTRUCTIONS
Office Policies    o Phone calls/patient messages:  Please allow up to 24 hours for someone in the office to contact you about your call or message. Be mindful your provider may be out of the office or your message may require further review. We encourage you to use [employer] for your messages as this is a faster, more efficient way to communicate with our office    o Medication Refills:  Prescription medications require up to 48 business hours to process. We encourage you to use [employer] for your refills. For controlled medications: Please allow up to 72 business hours to process. Certain medications may require you to  a written prescription at our office. NO narcotic/controlled medications will be prescribed after 4pm Monday through Friday or on weekends    o Form/Paperwork Completion:  Please note there is a $25 fee for all paperwork completed by our providers. We ask that you allow 7-14 business days. Pre-payment is due prior to picking up/faxing the completed form. You may also download your forms to [employer] to have your doctor print off. A Healthy Lifestyle: Care Instructions  Your Care Instructions    A healthy lifestyle can help you feel good, stay at a healthy weight, and have plenty of energy for both work and play. A healthy lifestyle is something you can share with your whole family. A healthy lifestyle also can lower your risk for serious health problems, such as high blood pressure, heart disease, and diabetes. You can follow a few steps listed below to improve your health and the health of your family. Follow-up care is a key part of your treatment and safety. Be sure to make and go to all appointments, and call your doctor if you are having problems. It's also a good idea to know your test results and keep a list of the medicines you take. How can you care for yourself at home? · Do not eat too much sugar, fat, or fast foods. You can still have dessert and treats now and then. The goal is moderation. · Start small to improve your eating habits. Pay attention to portion sizes, drink less juice and soda pop, and eat more fruits and vegetables. ¨ Eat a healthy amount of food. A 3-ounce serving of meat, for example, is about the size of a deck of cards. Fill the rest of your plate with vegetables and whole grains. ¨ Limit the amount of soda and sports drinks you have every day. Drink more water when you are thirsty. ¨ Eat at least 5 servings of fruits and vegetables every day. It may seem like a lot, but it is not hard to reach this goal. A serving or helping is 1 piece of fruit, 1 cup of vegetables, or 2 cups of leafy, raw vegetables. Have an apple or some carrot sticks as an afternoon snack instead of a candy bar. Try to have fruits and/or vegetables at every meal.  · Make exercise part of your daily routine. You may want to start with simple activities, such as walking, bicycling, or slow swimming. Try to be active 30 to 60 minutes every day. You do not need to do all 30 to 60 minutes all at once. For example, you can exercise 3 times a day for 10 or 20 minutes. Moderate exercise is safe for most people, but it is always a good idea to talk to your doctor before starting an exercise program.  · Keep moving. Davina Knuckles the lawn, work in the garden, or [place]. Take the stairs instead of the elevator at work. · If you smoke, quit. People who smoke have an increased risk for heart attack, stroke, cancer, and other lung illnesses. Quitting is hard, but there are ways to boost your chance of quitting tobacco for good. ¨ Use nicotine gum, patches, or lozenges. ¨ Ask your doctor about stop-smoking programs and medicines. ¨ Keep trying. In addition to reducing your risk of diseases in the future, you will notice some benefits soon after you stop using tobacco. If you have shortness of breath or asthma symptoms, they will likely get better within a few weeks after you quit.   · Limit how much alcohol you drink. Moderate amounts of alcohol (up to 2 drinks a day for men, 1 drink a day for women) are okay. But drinking too much can lead to liver problems, high blood pressure, and other health problems. Family health  If you have a family, there are many things you can do together to improve your health. · Eat meals together as a family as often as possible. · Eat healthy foods. This includes fruits, vegetables, lean meats and dairy, and whole grains. · Include your family in your fitness plan. Most people think of activities such as jogging or tennis as the way to fitness, but there are many ways you and your family can be more active. Anything that makes you breathe hard and gets your heart pumping is exercise. Here are some tips:  ¨ Walk to do errands or to take your child to school or the bus. ¨ Go for a family bike ride after dinner instead of watching TV. Where can you learn more? Go to http://martha-giuseppe.info/. Enter J846 in the search box to learn more about \"A Healthy Lifestyle: Care Instructions. \"  Current as of: May 12, 2017  Content Version: 11.4  © 1438-5061 [employer]. Care instructions adapted under license by [employer] (which disclaims liability or warranty for this information). If you have questions about a medical condition or this instruction, always ask your healthcare professional. [address] any warranty or liability for your use of this information.

## 2018-07-06 NOTE — LETTER
7/6/2018 7:54 PM 
 
Patient:  Zain Bardales YOB: 1934 Date of Visit: 7/6/2018 Dear No Recipients: Thank you for referring Ms. Zain Bardales to me for evaluation/treatment. Below are the relevant portions of my assessment and plan of care. Consult Subjective:  
 
Zain Bardales is a 80 y. o.right-handed  female seen today at the request of Dr. Pamela Levi for evaluation of new problem of increasing difficulty with swallowing, and several episodes of choking and possible aspiration and an abnormal barium swallow that showed that she has slight penetration on thin liquids, but handles thickened liquids well. She is going to see speech therapy, for further evaluation for that and order has been placed and she will be seen shortly. In addition she is also had a problem with some anxiety at times, and is trying to be tapered off her clonazepam because of her age and the adverse events of that causing doubling the risk of death. She is down to half a tablet twice a day which will go down to half a tablet once a day, and we will give her BuSpar to take 3 times a day as needed for the anxiety. We will see if that can help control her symptoms of her GI side effects for which she takes the clonazepam.  At that does not work we told her to check with her GI doctor about other medications. She had a previous history of TIAs, and has not had a Doppler study in 2 years, so we reordered that in addition to her carotid stenosis. Her PCP is monitoring her blood pressure, cholesterol, and she does not smoke, and she try to watch her diet. She takes aspirin daily. She is on blood pressure pills and cholesterol medications. She has chronic back pain, and is under pain management. She is also trying to come down on her gabapentin, with the feeling that but some of her medication may be causing her not to swallow so well.   We will cut her down to 300 mg 3 times a day and gave her a liquid form since she can swallow that better than the pill form and the 600 mg tablet which is large. We discussed her condition with the patient and her daughter at length. She has moved in with her daughter and her physical health seems to have taken a marked turn for the better. She seems less depressed and much more with it, and does not seem to have his may behavioral problems as much trouble with her memory. She continues to take the Aricept, does not think she needs any new medication. Her daughter feels that she needs medication for her behavior and agitation. We will try the BuSpar. She continues her vitamins and vitamin D on a regular basis. She also takes B12. .  She has had no more falls, and continues a regular exercise program.  That all seems to be somewhat better. The patient's family was concerned about her taking too much medication, and we will make the changes in the clonazepam and Neurontin as above. She denies any headache, any increased neck pain, but does have chronic low back pain and post lumbar laminectomy syndrome and is followed by pain management physician with intermittent injections in her spine. This may be also contributing to her gait instability. She had a normal CT of the head 12 months ago because of her ataxia, and did physical therapy. She still drives and has not had an accident but only drives locally. She feels no problem doing it. She has also seen for increasing back pain and has spinal cord stimulator insertion for post lumbar laminectomy syndrome. Patient had spinal cord stimulator inserted in April 2016, and is still recovering slowly, and she see her pain management doctor for increasing back pain. She has weakness in her legs and difficulty walking, and she had an MRI scan of the lumbar spine apparently shows a disc and spinal stenosis.  She doesn't seem to have much new weakness in the legs, and her bowel and bladder function is stable except for increasing difficulty emptying her bladder. She is seeing a urologist. Because of her increased pain she sees Dr. Aditi Hinton, her pain management specialist, who has treated her in the past.  She is not exercising like she is supposed too,but she says she will do it on her own now. She had previous back surgery and has a postlaminectomy syndrome, and now a new lumbar disc and spinal stenosis. Besides the weakness in her legs, she's had no other new weakness, sensory loss visual changes or headaches, or other new neurological problems except for the neurogenic bladder. She is using a cane or walker for ambulation because of her back pain. She Has had no other new medical problems complications or illnesses except for a bout of diverticulitis. She is on Aricept 10 mg a day and is on clonazepam .5 mg each day. She no longer uses Ambien for sleep and takes vitamins regularly. Her past medical history is pertinent for hypertension, depression, GERD, asthma, arthritis, post laminectomy syndrome, lumbar radiculopathy, a central tremor, dementia, ataxic gait, cataract surgery, cervical fusion, right carpal tunnel syndrome, post cervical laminectomy fusion. Hysterectomy and bilateral salpingo-oophorectomy, gallbladder surgery, right carpal tunnel surgery. Past Medical History:  
Diagnosis Date  Arthritis  Asthma  Chronic pain  GERD (gastroesophageal reflux disease)  Hypertension  Vertigo Past Surgical History:  
Procedure Laterality Date  HX APPENDECTOMY Hermes Marie BACK SURGERY  05/21/2015  
 stimulator Medtronic  HX CATARACT REMOVAL    
 bilateral  
 HX CERVICAL FUSION    
 anterior disc fusion  HX HEENT    
 uppp  HX LAP CHOLECYSTECTOMY  HX ORTHOPAEDIC    
 right carpal tunnel release  HX MATT AND BSO Family History Problem Relation Age of Onset  Stroke Mother  Cancer Father Social History Substance Use Topics  Smoking status: Former Smoker  Smokeless tobacco: Never Used Comment: stopped 25 plus years ago  Alcohol use No  
   
Current Outpatient Prescriptions Medication Sig Dispense Refill  PROAIR RESPICLICK 90 mcg/actuation aepb INL 2 PFS PO Q 4 H PRN  6  
 fluticasone-salmeterol 232-14 mcg/actuation aepb INL 2 PFS PO BID  12  
 montelukast (SINGULAIR) 10 mg tablet TK 1 T PO D  3  
 gabapentin (NEURONTIN) 250 mg/5 mL solution Take 6 mL by mouth three (3) times daily. 600 mL 11  
 busPIRone (BUSPAR) 10 mg tablet Take 1 Tab by mouth three (3) times daily (after meals). 100 Tab 11  
 clonazePAM (KLONOPIN) 0.5 mg tablet Take 0.5 Tabs by mouth nightly. Max Daily Amount: 0.25 mg. 30 Tab 11  
 CYANOCOBALAMIN, VITAMIN B-12, (VITAMIN B-12 PO) Take  by mouth daily.  aspirin delayed-release 81 mg tablet Take  by mouth daily.  colestipol (COLESTID) 1 gram tablet Take 1 g by mouth two (2) times a day.  SW-ZK-CJ-Fe-Min-Lycopen-Lutein (CENTRUM) 0.4-162-18 mg tab Take  by mouth.  traZODone (DESYREL) 50 mg tablet Take 50 mg by mouth nightly.  donepezil (ARICEPT) 10 mg tablet Take 1 Tab by mouth daily. 90 Tab 3  
 dicyclomine (BENTYL) 10 mg capsule Take 10 mg by mouth four (4) times daily.  losartan (COZAAR) 100 mg tablet Take 100 mg by mouth daily.  sertraline (ZOLOFT) 100 mg tablet Take 100 mg by mouth every morning.  omeprazole (PRILOSEC) 40 mg capsule Take 40 mg by mouth daily.  budesonide-formoterol (SYMBICORT) 160-4.5 mcg/Actuation HFA inhaler Take 2 Puffs by inhalation as needed. Allergies Allergen Reactions  Adhesive Tape Other (comments)  
  hurts the area it is placed on Review of Systems: A comprehensive review of systems was negative except for: Musculoskeletal: positive for myalgias, arthralgias, stiff joints and back pain Neurological: positive for memory problems and tremor Behvioral/Psych: positive for anxiety and depression Objective: I 
 
 
 NEUROLOGICAL EXAM: 
 
Appearance: The patient is well developed, well nourished, provides a fair history and is in no acute distress. Mental Status: Oriented to time, place and person and the president, but misses the day of the month. Her speech is 100% intelligible, and she has mild cognitive impairment because of her dementia. She can't remember only one of 3 words at 30 seconds with distraction, and cannot subtract serial sevens, or spell the word world backward, or draw a clock that shows a time 10 minutes to 11. Patient is a little slow mentally for some recent memory loss but actually functioning fairly well in her ADLs. Mood and affect slightly anxious and depressed. Cranial Nerves:   Intact visual fields. Fundi are benign. FRANK, EOM's full, no nystagmus, no ptosis. Facial sensation is normal. Corneal reflexes are not tested. Facial movement is symmetric. Hearing is abnormal bilaterally. Palate is midline with normal sternocleidomastoid and trapezius muscles are normal. Tongue is midline. Neck without meningismus or bruits Temporal arteries are not tender or enlarged Motor:  4/5 strength in upper and lower proximal and distal muscles. Normal bulk and tone. No fasciculations. Patient has negative straight leg raising test negative in the sitting position but has percussion tenderness over the lumbar spine Patient had a spinal stimulator in the lumbar spine with stable surgical incisions Reflexes:   Deep tendon reflexes 1+/4 and symmetrical. No Babinski or clonus present Sensory:   Normal to touch, pinprick and DSS, and temperature and vibration mildly decreased in both lower extremities. Gait:  Abnormal gait for age, as she does move slowly due to arthritis in her back, walk with a limp on her right leg, and normally uses a cane for ambulation. Tremor:   Mild intention tremor noted. Cerebellar:  Mldly abnormal Romberg and tandem cerebellar signs present. Neurovascular:  Normal heart sounds and regular rhythm, peripheral pulses decreased in both feet, and no carotid bruits. Assessment:  
 
 
Plan:  
 
Patient with no carotid Doppler study in 2 years, will get repeat Doppler study next week. She has a history of TIAs. We discussed her condition with the patient and her daughter in detail. Patient with possible too much medication causing some difficulty with swallowing, we will decrease her Neurontin, put her on liquid form to help, and decrease her clonazepam just a half a tablet at nighttime only and give her BuSpar during the daytime. Because of her back pain, she will continue her Neurontin to 300 mg 3 times a day, and new prescription sent in for patient today liquid form. We encourage her to remain active and try to do her back exercises in addition and continue seeing her pain management physician Patient is encouraged to continue her vitamins, vitamin D, remain mentally and physically active, and start exercising more 35 minutes spent with patient and the daughter, and advised her that she has a progressive degenerative brain condition that will only get worse with time. Patient is to continue her clonazepam at the reduced dose one half tablet twice a day for her intestinal spasms CT of the head and metabolic parameters were all normal last visit 6 months ago Patient is to continue her other medication and try to continue a regular exercise program 
Patient is to see her pain management specialist, for continued treatment and spinal cord stimulator followup Patient is to continue current medications and start exercising more We discussed with her and her daughter in detail, if there is any doubt about her driving ability she should stop immediately or get tested by [facility] Patient will be seen again in 6 months time or earlier as needed. She will call if any problem in the interim. Discuss her condition with patient and daughters at length,  
Time of visit was 35 minutes today She is to keep the lights on in the house at night and be careful when she moves or changes position quickly She will call if any problem in the interim. Signed By: Ivy Schmitt MD   
 July 6, 2018 This note will not be viewable in [address] If you have questions, please do not hesitate to call me. I look forward to following MsValentino Mary Hoffman along with you. Sincerely, Ivy Schmitt MD

## 2018-07-06 NOTE — MR AVS SNAPSHOT
[address], 
[address], [address] [facility] 
[phone] Patient: Delrae Lanes MRN: [id number] GFJ:[id number] Visit Information Date & Time Provider Department Dept. Phone Encounter #  
 7/6/2018  3:00 PM Annie Jermoe MD [facility] [phone] [id number] Follow-up Instructions Return in about 6 months (around 1/6/2019). Your Appointments 7/12/2018  1:00 PM  
PROCEDURE with DOPPLER_NEUMR Neurology Clinic at [address]) Appt Note: doppler tas patient [facility], 
[address] [place] [address], [address] [facility]  
[phone]  
  
   
 [address] [place] [address], [address] [place] [address], [phone]  
  
    
 3/8/2019  3:00 PM  
Follow Up with Annie Jerome MD  
Neurology Clinic at [address]) Appt Note: f/u memory, jrb 7/6/18  
 [facility], 
[address] [place] [address], [phone]  
[facility], [address] [place] [address], [address] [phone] Upcoming Health Maintenance Date Due DTaP/Tdap/Td series (1 - Tdap) 6/24/1955 ZOSTER VACCINE AGE 60> 4/24/1994 GLAUCOMA SCREENING Q2Y 6/24/1999 Bone Densitometry (Dexa) Screening 6/24/1999 Pneumococcal 65+ Low/Medium Risk (1 of 2 - PCV13) 6/24/1999 MEDICARE YEARLY EXAM 3/14/2018 Influenza Age 5 to Adult 8/1/2018 Allergies as of 7/6/2018  Review Complete On: 7/6/2018 By: Annie Jerome MD  
  
 Severity Noted Reaction Type Reactions Adhesive Tape Low 07/15/2010   Side Effect Other (comments)  
 hurts the area it is placed on  
  
Current Immunizations  Never Reviewed No immunizations on file. Not reviewed this visit You Were Diagnosed With   
  
 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 
ICD-9-CM: 724.02 Fibromyalgia     ICD-10-CM: M79.7 ICD-9-CM: 729.1 Lumbar post-laminectomy syndrome     ICD-10-CM: M96.1 ICD-9-CM: 722.83 Ataxic gait     ICD-10-CM: R26.0 ICD-9-CM: 983. 2 Lumbar radicular pain     ICD-10-CM: M54.16 
ICD-9-CM: 724.4 Late onset Alzheimer's disease without behavioral disturbance     ICD-10-CM: G30.1, F02.80 ICD-9-CM: 331.0, 294.10 Alzheimer's type dementia with late onset without behavioral disturbance     ICD-10-CM: G30.1, F02.80 ICD-9-CM: 331.0, 294.10 Spinal stenosis, lumbar     ICD-10-CM: M48.061 
ICD-9-CM: 724.02 Vitals BP Pulse Temp Resp Height(growth percentile) Weight(growth percentile) 120/66 85 98 °F (36.7 °C) 12 5' 5\" (1.651 m) 163 lb (73.9 kg) SpO2 BMI Smoking Status 95% 27.12 kg/m2 Former Smoker Vitals History BMI and BSA Data Body Mass Index Body Surface Area  
 27.12 kg/m 2 1.84 m 2 Preferred Pharmacy Pharmacy Name Phone [facility], [address] [place] [address] AT [address] [place] [address] Drive [phone] Your Updated Medication List  
  
   
This list is accurate as of 7/6/18  3:20 PM.  Always use your most recent med list.  
  
  
  
  
 aspirin delayed-release 81 mg tablet Take  by mouth daily. busPIRone 10 mg tablet Commonly known as:  BUSPAR Take 1 Tab by mouth three (3) times daily (after meals). CENTRUM 0.4-162-18 mg Tab Generic drug:  YW-CD-AV-Fe-Min-Lycopen-Lutein Take  by mouth.  
  
 clonazePAM 0.5 mg tablet Commonly known as:  Deva Listen Take 0.5 Tabs by mouth nightly. Max Daily Amount: 0.25 mg. COLESTID 1 gram tablet Generic drug:  colestipol Take 1 g by mouth two (2) times a day. dicyclomine 10 mg capsule Commonly known as:  BENTYL Take 10 mg by mouth four (4) times daily. donepezil 10 mg tablet Commonly known as:  ARICEPT Take 1 Tab by mouth daily. fluticasone-salmeterol 232-14 mcg/actuation Aepb INL 2 PFS PO BID  
  
 gabapentin 250 mg/5 mL solution Commonly known as:  NEURONTIN Take 6 mL by mouth three (3) times daily. losartan 100 mg tablet Commonly known as:  COZAAR Take 100 mg by mouth daily. montelukast 10 mg tablet Commonly known as:  SINGULAIR TK 1 T PO D  
  
 omeprazole 40 mg capsule Commonly known as:  PRILOSEC Take 40 mg by mouth daily. PROAIR RESPICLICK 90 mcg/actuation Aepb Generic drug:  albuterol sulfate INL 2 PFS PO Q 4 H PRN  
  
 sertraline 100 mg tablet Commonly known as:  ZOLOFT Take 100 mg by mouth every morning. SYMBICORT 160-4.5 mcg/actuation Hfaa Generic drug:  budesonide-formoterol Take 2 Puffs by inhalation as needed. traZODone 50 mg tablet Commonly known as:  Cantil Nany Take 50 mg by mouth nightly. VITAMIN B-12 PO Take  by mouth daily. Prescriptions Sent to Pharmacy Refills  
 gabapentin (NEURONTIN) 250 mg/5 mL solution 11 Sig: Take 6 mL by mouth three (3) times daily. Class: Normal  
 Pharmacy: [facility] [address] Ph #: [phone] Route: Oral  
 busPIRone (BUSPAR) 10 mg tablet 11 Sig: Take 1 Tab by mouth three (3) times daily (after meals). Class: Normal  
 Pharmacy: [facility] [address] Ph #: [phone] Route: Oral  
  
Follow-up Instructions Return in about 6 months (around 1/6/2019). To-Do List   
 07/12/2018 Imaging:  DUPLEX CAROTID BILATERAL AMB NEURO Patient Instructions Office Policies 
 
o Phone calls/patient messages: Please allow up to 24 hours for someone in the office to contact you about your call or message. Be mindful your provider may be out of the office or your message may require further review. We encourage you to use eCullet for your messages as this is a faster, more efficient way to communicate with our office 
 
o Medication Refills: 
Prescription medications require up to 48 business hours to process. We encourage you to use eCullet for your refills. For controlled medications: Please allow up to 72 business hours to process. Certain medications may require you to  a written prescription at our office. NO narcotic/controlled medications will be prescribed after 4pm Monday through Friday or on weekends 
 
o Form/Paperwork Completion: 
Please note there is a $25 fee for all paperwork completed by our providers. We ask that you allow 7-14 business days. Pre-payment is due prior to picking up/faxing the completed form. You may also download your forms to eCullet to have your doctor print off. A Healthy Lifestyle: Care Instructions Your Care Instructions A healthy lifestyle can help you feel good, stay at a healthy weight, and have plenty of energy for both work and play. A healthy lifestyle is something you can share with your whole family. A healthy lifestyle also can lower your risk for serious health problems, such as high blood pressure, heart disease, and diabetes. You can follow a few steps listed below to improve your health and the health of your family. Follow-up care is a key part of your treatment and safety. Be sure to make and go to all appointments, and call your doctor if you are having problems.  It's also a good idea to know your test results and keep a list of the medicines you take. How can you care for yourself at home? · Do not eat too much sugar, fat, or fast foods. You can still have dessert and treats now and then. The goal is moderation. · Start small to improve your eating habits. Pay attention to portion sizes, drink less juice and soda pop, and eat more fruits and vegetables. ¨ Eat a healthy amount of food. A 3-ounce serving of meat, for example, is about the size of a deck of cards. Fill the rest of your plate with vegetables and whole grains. ¨ Limit the amount of soda and sports drinks you have every day. Drink more water when you are thirsty. ¨ Eat at least 5 servings of fruits and vegetables every day. It may seem like a lot, but it is not hard to reach this goal. A serving or helping is 1 piece of fruit, 1 cup of vegetables, or 2 cups of leafy, raw vegetables. Have an apple or some carrot sticks as an afternoon snack instead of a candy bar. Try to have fruits and/or vegetables at every meal. 
· Make exercise part of your daily routine. You may want to start with simple activities, such as walking, bicycling, or slow swimming. Try to be active 30 to 60 minutes every day. You do not need to do all 30 to 60 minutes all at once. For example, you can exercise 3 times a day for 10 or 20 minutes. Moderate exercise is safe for most people, but it is always a good idea to talk to your doctor before starting an exercise program. 
· Keep moving. Amanda Mckeon the lawn, work in the garden, or [employer]. Take the stairs instead of the elevator at work. · If you smoke, quit. People who smoke have an increased risk for heart attack, stroke, cancer, and other lung illnesses. Quitting is hard, but there are ways to boost your chance of quitting tobacco for good. ¨ Use nicotine gum, patches, or lozenges. ¨ Ask your doctor about stop-smoking programs and medicines. ¨ Keep trying.  
In addition to reducing your risk of diseases in the future, you will notice some benefits soon after you stop using tobacco. If you have shortness of breath or asthma symptoms, they will likely get better within a few weeks after you quit. · Limit how much alcohol you drink. Moderate amounts of alcohol (up to 2 drinks a day for men, 1 drink a day for women) are okay. But drinking too much can lead to liver problems, high blood pressure, and other health problems. Family health If you have a family, there are many things you can do together to improve your health. · Eat meals together as a family as often as possible. · Eat healthy foods. This includes fruits, vegetables, lean meats and dairy, and whole grains. · Include your family in your fitness plan. Most people think of activities such as jogging or tennis as the way to fitness, but there are many ways you and your family can be more active. Anything that makes you breathe hard and gets your heart pumping is exercise. Here are some tips: 
¨ Walk to do errands or to take your child to school or the bus. ¨ Go for a family bike ride after dinner instead of watching TV. Where can you learn more? Go to http://marthaTHREAT STREAMgiuseppe.info/. Enter H276 in the search box to learn more about \"A Healthy Lifestyle: Care Instructions. \" Current as of: May 12, 2017 Content Version: 11.4 © 9705-5016 [employer]. Care instructions adapted under license by [employer] (which disclaims liability or warranty for this information). If you have questions about a medical condition or this instruction, always ask your healthcare professional. [place] [address] any warranty or liability for your use of this information. Introducing [facility]! Deanyasia Valerio 51: Thank you for requesting a "[employer]" account. Our records indicate that you already have an active "[employer]" account. You can access your account anytime at [email or web address] Did you know that you can access your hospital and ER discharge instructions at any time in [employer]? You can also review all of your test results from your hospital stay or ER visit. Additional Information If you have questions, please visit the Frequently Asked Questions section of the [employer] website at [email or web address] Remember, [employer] is NOT to be used for urgent needs. For medical emergencies, dial 911. Now available from your iPhone and Android! Please provide this summary of care documentation to your next provider. Your primary care clinician is listed as Lynda Maldonado III. If you have any questions after today's visit, please call [phone].

## 2018-07-09 ENCOUNTER — TELEPHONE (OUTPATIENT)
Dept: NEUROLOGY | Age: 83
End: 2018-07-09

## 2018-07-09 NOTE — TELEPHONE ENCOUNTER
----- Message from Delfin Weaver sent at 7/9/2018  9:30 AM EDT -----  Regarding:  Miladis Castro, daughter, requested a call back regarding upcoming Doppler appt. Best contact [phone].

## 2018-07-09 NOTE — TELEPHONE ENCOUNTER
I called the patient's daughter and she wanted to know if she will get the results. I advised that they should call about 1 week after the test is done for the results. Also she wanted to know why it was ordered. Advised this is just routine as she has a history of TIA's and has not had a carotid doppler per notes in 2 years.     She acknowledged understanding

## 2018-07-12 ENCOUNTER — OFFICE VISIT (OUTPATIENT)
Dept: NEUROLOGY | Age: 83
End: 2018-07-12

## 2018-07-12 DIAGNOSIS — G30.1 ALZHEIMER'S TYPE DEMENTIA WITH LATE ONSET WITHOUT BEHAVIORAL DISTURBANCE (HCC): ICD-10-CM

## 2018-07-12 DIAGNOSIS — F02.80 DEMENTIA IN ALZHEIMER'S DISEASE WITH EARLY ONSET WITHOUT BEHAVIORAL DISTURBANCE (HCC): ICD-10-CM

## 2018-07-12 DIAGNOSIS — M96.1 LUMBAR POST-LAMINECTOMY SYNDROME: ICD-10-CM

## 2018-07-12 DIAGNOSIS — G30.1 LATE ONSET ALZHEIMER'S DISEASE WITHOUT BEHAVIORAL DISTURBANCE (HCC): ICD-10-CM

## 2018-07-12 DIAGNOSIS — I65.23 STENOSIS OF BOTH INTERNAL CAROTID ARTERIES: ICD-10-CM

## 2018-07-12 DIAGNOSIS — G30.0 DEMENTIA IN ALZHEIMER'S DISEASE WITH EARLY ONSET WITHOUT BEHAVIORAL DISTURBANCE (HCC): ICD-10-CM

## 2018-07-12 DIAGNOSIS — I67.89 CEREBRAL MICROVASCULAR DISEASE: Primary | ICD-10-CM

## 2018-07-12 DIAGNOSIS — M48.061 SPINAL STENOSIS OF LUMBAR REGION WITHOUT NEUROGENIC CLAUDICATION: ICD-10-CM

## 2018-07-12 DIAGNOSIS — M79.7 FIBROMYALGIA: ICD-10-CM

## 2018-07-12 DIAGNOSIS — M54.16 LUMBAR RADICULAR PAIN: ICD-10-CM

## 2018-07-12 DIAGNOSIS — R26.0 ATAXIC GAIT: ICD-10-CM

## 2018-07-12 DIAGNOSIS — F02.80 ALZHEIMER'S TYPE DEMENTIA WITH LATE ONSET WITHOUT BEHAVIORAL DISTURBANCE (HCC): ICD-10-CM

## 2018-07-12 DIAGNOSIS — F02.80 LATE ONSET ALZHEIMER'S DISEASE WITHOUT BEHAVIORAL DISTURBANCE (HCC): ICD-10-CM

## 2018-07-12 DIAGNOSIS — E03.4 HYPOTHYROIDISM DUE TO ACQUIRED ATROPHY OF THYROID: ICD-10-CM

## 2018-07-12 NOTE — PROCEDURES
This study consisted of pulsed wave Doppler examination, Color-flow imaging, and Duplex imaging of both the right and left carotid systems, and both vertebral arteries.        Imaging of both right and left carotid systems showed mild mixed plaquing at the bifurcations and proximal and distal internal and external carotid arteries bilaterally, with stenosis in the range of 0-15% only and with no flow abnormalities identified.        Left vertebral artery showed normal antegrade flow, but the right vertebral artery could not be identified, most likely reflecting technical difficulty, but cannot completely rule out congenital variant or obstructive cerebrovascular disease. If clinically indicated other imaging modalities like CTA or MRA may be of further diagnostic benefit. It was noted that this finding was the same as the patient's previous Doppler done 1 year ago which also showed the same nonvisualization of the left vertebral artery. .       Clinical correlation recommended, and repeat studies in 6-12 months time may be of further diagnostic benefit.

## 2018-08-07 ENCOUNTER — TELEPHONE (OUTPATIENT)
Dept: NEUROLOGY | Age: 83
End: 2018-08-07

## 2018-08-07 DIAGNOSIS — M96.1 LUMBAR POST-LAMINECTOMY SYNDROME: Primary | ICD-10-CM

## 2018-08-07 RX ORDER — GABAPENTIN 300 MG/1
300 CAPSULE ORAL 3 TIMES DAILY
Qty: 100 CAP | Refills: 11 | Status: SHIPPED | OUTPATIENT
Start: 2018-08-07 | End: 2019-07-25 | Stop reason: SDUPTHER

## 2018-08-07 NOTE — TELEPHONE ENCOUNTER
Patient was put on liquid Gabapentin because of swallowing issues. Patient can't take the liquid form of Gabapentin as it tastes horrible. Has a peppermint strawberry taste. Capsules can be opened and given without changing absorption per pharmacist.      Can we try this? Send over script stating to break open to sprinkle in small food substance?      Advised that I would call back tomorrow with directions

## 2018-08-08 NOTE — TELEPHONE ENCOUNTER
[facility], tell patient I sent in the gabapentin capsules with directions to break open

## 2019-04-08 ENCOUNTER — TELEPHONE (OUTPATIENT)
Dept: NEUROLOGY | Age: 84
End: 2019-04-08

## 2019-05-06 ENCOUNTER — TELEPHONE (OUTPATIENT)
Dept: NEUROLOGY | Age: 84
End: 2019-05-06

## 2019-07-25 DIAGNOSIS — M96.1 LUMBAR POST-LAMINECTOMY SYNDROME: ICD-10-CM

## 2019-07-26 NOTE — TELEPHONE ENCOUNTER
Future Appointments   Date Time Provider Durga Gardneri   8/16/2019 10:00 AM Singh Martínez  [facility] [place]   1/6/2020 10:00 AM Singh Martínez  [facility]                         Last Appointment My Department:  7/6/18    Please advise of refill below.  For Dr. Stephanie Reyes as he is out of the office     Requested Prescriptions     Pending Prescriptions Disp Refills    gabapentin (NEURONTIN) 300 mg capsule [Pharmacy Med Name: GABAPENTIN 300MG CAPSULES] 100 Cap 0     Sig: TAKE 1 CAPSULE BY MOUTH THREE TIMES DAILY( CAN BREAK CAPSULE OPEN AND SPRINKLE ON FOOD)

## 2019-07-29 RX ORDER — GABAPENTIN 300 MG/1
CAPSULE ORAL
Qty: 100 CAP | Refills: 0 | OUTPATIENT
Start: 2019-07-29 | End: 2019-08-16 | Stop reason: SDUPTHER

## 2019-08-16 ENCOUNTER — OFFICE VISIT (OUTPATIENT)
Dept: NEUROLOGY | Age: 84
End: 2019-08-16

## 2019-08-16 VITALS
HEIGHT: 65 IN | RESPIRATION RATE: 16 BRPM | DIASTOLIC BLOOD PRESSURE: 70 MMHG | BODY MASS INDEX: 28.16 KG/M2 | HEART RATE: 78 BPM | OXYGEN SATURATION: 97 % | WEIGHT: 169 LBS | SYSTOLIC BLOOD PRESSURE: 134 MMHG

## 2019-08-16 DIAGNOSIS — F02.80 DEMENTIA IN ALZHEIMER'S DISEASE WITH EARLY ONSET WITHOUT BEHAVIORAL DISTURBANCE (HCC): ICD-10-CM

## 2019-08-16 DIAGNOSIS — G30.0 DEMENTIA IN ALZHEIMER'S DISEASE WITH EARLY ONSET WITHOUT BEHAVIORAL DISTURBANCE (HCC): ICD-10-CM

## 2019-08-16 DIAGNOSIS — F02.80 LATE ONSET ALZHEIMER'S DISEASE WITHOUT BEHAVIORAL DISTURBANCE (HCC): ICD-10-CM

## 2019-08-16 DIAGNOSIS — I67.89 CEREBRAL MICROVASCULAR DISEASE: ICD-10-CM

## 2019-08-16 DIAGNOSIS — I65.23 STENOSIS OF BOTH INTERNAL CAROTID ARTERIES: ICD-10-CM

## 2019-08-16 DIAGNOSIS — M96.1 LUMBAR POST-LAMINECTOMY SYNDROME: Primary | ICD-10-CM

## 2019-08-16 DIAGNOSIS — M79.7 FIBROMYALGIA: ICD-10-CM

## 2019-08-16 DIAGNOSIS — R26.0 ATAXIC GAIT: ICD-10-CM

## 2019-08-16 DIAGNOSIS — M48.061 SPINAL STENOSIS OF LUMBAR REGION WITHOUT NEUROGENIC CLAUDICATION: ICD-10-CM

## 2019-08-16 DIAGNOSIS — G30.1 LATE ONSET ALZHEIMER'S DISEASE WITHOUT BEHAVIORAL DISTURBANCE (HCC): ICD-10-CM

## 2019-08-16 DIAGNOSIS — G25.0 BENIGN ESSENTIAL TREMOR SYNDROME: ICD-10-CM

## 2019-08-16 RX ORDER — GABAPENTIN 300 MG/1
300 CAPSULE ORAL 2 TIMES DAILY
Qty: 100 CAP | Refills: 5 | Status: SHIPPED | OUTPATIENT
Start: 2019-08-16 | End: 2020-03-10

## 2019-08-16 RX ORDER — TRAMADOL HYDROCHLORIDE 50 MG/1
50 TABLET ORAL
COMMUNITY

## 2019-08-16 RX ORDER — DONEPEZIL HYDROCHLORIDE 10 MG/1
10 TABLET, FILM COATED ORAL DAILY
Qty: 90 TAB | Refills: 3 | Status: SHIPPED | OUTPATIENT
Start: 2019-08-16 | End: 2020-06-14

## 2019-08-16 RX ORDER — BUSPIRONE HYDROCHLORIDE 10 MG/1
10 TABLET ORAL
Qty: 100 TAB | Refills: 5 | Status: SHIPPED | OUTPATIENT
Start: 2019-08-16 | End: 2019-09-17 | Stop reason: SDUPTHER

## 2019-08-16 NOTE — LETTER
8/16/2019 9:22 PM 
 
Patient:  Alex Man YOB: 1934 Date of Visit: 8/16/2019 Dear No Recipients: Thank you for referring Ms. Alex Man to me for evaluation/treatment. Below are the relevant portions of my assessment and plan of care. Consult Subjective:  
 
Alex Man is a 80 y. o.right-handed  female seen today at the request of Dr. Orville Salvador for evaluation of new problem of increasing difficulty with back pain and had a spinal cord stimulator inserted which is not helped all that much so far, may be a little, and so far she had recent radiofrequency ablation of the spinal nerve, and a medial branch denervation procedure or injection to help control her pain which has helped some. She is going to see her pain doctor Dr. Wilmer Mack in another 2 weeks. Her PCP is also cut down her gabapentin to twice a day and will try to get her off of it, after long discussion I told the patient that it is helping her pain is better than narcotics and she should continue on the medication we will refill that for her today. Her PCP is retiring. She also is still on the clonazepam, and we reviewed the black box warning of doubling the risk of death, we suggest that she go back on the BuSpar 10 mg 3 times a day and try to get off the narcotics and her family wholeheartedly supports the same thing. Particularly if she is going to be giving some narcotics she cannot mix the benzos with those. She has moved out of her daughter's house is now living alone, and seem to be doing okay. Her memory seems to be okay also and she has no major worsening of the memory. Her swallowing seems better after GI evaluation. We will see if that can help control her symptoms of her GI side effects for which she takes the clonazepam.  At that does not work we told her to check with her GI doctor about other medications.   She had a previous history of TIAs, and has not had a Doppler study in 1 years, so we reordered that in addition to her carotid stenosis. Her PCP is monitoring her blood pressure, cholesterol, and she does not smoke, and she try to watch her diet. She takes aspirin daily. She is on blood pressure pills and cholesterol medications. She has chronic back pain, and is under pain management. She is also trying to come down on her gabapentin, with the feeling that but some of her medication may be causing her not to swallow so well. We will cut her down to 300 mg 2 times a day and gave her a liquid form since she can swallow that better than the pill form and the 600 mg tablet which is large. We discussed her condition with the patient and her daughter at length. She continues to take the Aricept, does not think she needs any new medication. Her daughter feels that she needs medication for her behavior and agitation. We will try the BuSpar. She continues her vitamins and vitamin D on a regular basis. She also takes B12. .  She has had no more falls, and continues a regular exercise program.  That all seems to be somewhat better. The patient's family was concerned about her taking too much medication, and we will make the changes in the clonazepam and Neurontin as above. She denies any headache, any increased neck pain, but does have chronic low back pain and post lumbar laminectomy syndrome and is followed by pain management physician with intermittent injections in her spine. This may be also contributing to her gait instability. She had a normal CT of the head 12 months ago because of her ataxia, and did physical therapy. She still drives and has not had an accident but only drives locally. She feels no problem doing it. She has also seen for increasing back pain and has spinal cord stimulator insertion for post lumbar laminectomy syndrome.  Patient had spinal cord stimulator inserted in April 2016, and is still recovering slowly, and she see her pain management doctor for increasing back pain. She has weakness in her legs and difficulty walking, and she had an MRI scan of the lumbar spine apparently shows a disc and spinal stenosis. She doesn't seem to have much new weakness in the legs, and her bowel and bladder function is stable except for increasing difficulty emptying her bladder. She is seeing a urologist. Because of her increased pain she sees Dr. Ronald Perla, her pain management specialist, who has treated her in the past.  She is not exercising like she is supposed too,but she says she will do it on her own now. She had previous back surgery and has a postlaminectomy syndrome, and now a new lumbar disc and spinal stenosis. Besides the weakness in her legs, she's had no other new weakness, sensory loss visual changes or headaches, or other new neurological problems except for the neurogenic bladder. She is using a cane or walker for ambulation because of her back pain. She Has had no other new medical problems complications or illnesses except for a bout of diverticulitis. She is on Aricept 10 mg a day and is on clonazepam .5 mg each day. She no longer uses Ambien for sleep and takes vitamins regularly. Her past medical history is pertinent for hypertension, depression, GERD, asthma, arthritis, post laminectomy syndrome, lumbar radiculopathy, a central tremor, dementia, ataxic gait, cataract surgery, cervical fusion, right carpal tunnel syndrome, post cervical laminectomy fusion. Hysterectomy and bilateral salpingo-oophorectomy, gallbladder surgery, right carpal tunnel surgery. Past Medical History:  
Diagnosis Date  Arthritis  Asthma  Chronic pain  GERD (gastroesophageal reflux disease)  Hypertension  Vertigo Past Surgical History:  
Procedure Laterality Date  HX APPENDECTOMY Jayleneanislado Lesches BACK SURGERY  05/21/2015  
 stimulator [employer]  HX CATARACT REMOVAL    
 bilateral  
 HX CERVICAL FUSION    
 anterior disc fusion  HX HEENT    
 uppp  HX LAP CHOLECYSTECTOMY  HX ORTHOPAEDIC    
 right carpal tunnel release  HX MATT AND BSO Family History Problem Relation Age of Onset  Stroke Mother  Cancer Father Social History Tobacco Use  Smoking status: Former Smoker  Smokeless tobacco: Never Used  Tobacco comment: stopped 25 plus years ago Substance Use Topics  Alcohol use: No  
   
Current Outpatient Medications Medication Sig Dispense Refill  traMADol (ULTRAM) 50 mg tablet Take 50 mg by mouth every six (6) hours as needed for Pain.  busPIRone (BUSPAR) 10 mg tablet Take 1 Tab by mouth three (3) times daily as needed (anxiety). 100 Tab 5  
 gabapentin (NEURONTIN) 300 mg capsule Take 1 Cap by mouth two (2) times a day. Max Daily Amount: 600 mg. 100 Cap 5  
 donepezil (ARICEPT) 10 mg tablet Take 1 Tab by mouth daily. 90 Tab 3  
 PROAIR RESPICLICK 90 mcg/actuation aepb INL 2 PFS PO Q 4 H PRN  6  
 fluticasone-salmeterol 232-14 mcg/actuation aepb INL 2 PFS PO BID  12  
 montelukast (SINGULAIR) 10 mg tablet TK 1 T PO D  3  
 clonazePAM (KLONOPIN) 0.5 mg tablet Take 0.5 Tabs by mouth nightly. Max Daily Amount: 0.25 mg. 30 Tab 11  
 CYANOCOBALAMIN, VITAMIN B-12, (VITAMIN B-12 PO) Take  by mouth daily.  aspirin delayed-release 81 mg tablet Take  by mouth daily.  colestipol (COLESTID) 1 gram tablet Take 1 g by mouth two (2) times a day.  PO-FJ-TX-Fe-Min-Lycopen-Lutein (CENTRUM) 0.4-162-18 mg tab Take  by mouth.  traZODone (DESYREL) 50 mg tablet Take 50 mg by mouth nightly.  dicyclomine (BENTYL) 10 mg capsule Take 10 mg by mouth four (4) times daily.  losartan (COZAAR) 100 mg tablet Take 100 mg by mouth daily.  sertraline (ZOLOFT) 100 mg tablet Take 100 mg by mouth every morning.  omeprazole (PRILOSEC) 40 mg capsule Take 40 mg by mouth daily.  budesonide-formoterol (SYMBICORT) 160-4.5 mcg/Actuation HFA inhaler Take 2 Puffs by inhalation as needed. Allergies Allergen Reactions  Adhesive Tape Other (comments)  
  hurts the area it is placed on Review of Systems: A comprehensive review of systems was negative except for: Musculoskeletal: positive for myalgias, arthralgias, stiff joints and back pain Neurological: positive for memory problems and tremor Behvioral/Psych: positive for anxiety and depression Objective: I 
 
 
NEUROLOGICAL EXAM: 
 
Appearance: The patient is well developed, well nourished, provides a fair history and is in no acute distress. Mental Status: Oriented to time, place and person and the president, but misses the day of the month. Her speech is 100% intelligible, and she has mild cognitive impairment because of her dementia. She can't remember only one of 3 words at 30 seconds with distraction, and cannot subtract serial sevens, or spell the word world backward, or draw a clock that shows a time 10 minutes to 11. Patient is a little slow mentally for some recent memory loss but actually functioning fairly well in her ADLs. Mood and affect slightly anxious and depressed. Cranial Nerves:   Intact visual fields. Fundi are benign, discs are flat no lesions seen on funduscopy. . FRANK, EOM's full, no nystagmus, no ptosis. Facial sensation is normal. Corneal reflexes are not tested. Facial movement is symmetric. Hearing is abnormal bilaterally. Palate is midline with normal sternocleidomastoid and trapezius muscles are normal. Tongue is midline. Neck without meningismus or bruits Temporal arteries are not tender or enlarged Motor:  4/5 strength in upper and lower proximal and distal muscles. Normal bulk and tone. No fasciculations. Patient has negative straight leg raising test negative in the sitting position but has percussion tenderness over the lumbar spine Patient had a spinal stimulator in the lumbar spine with stable surgical incisions Rapid altering movement is slow in all extremities Reflexes:   Deep tendon reflexes 1+/4 and symmetrical. No Babinski or clonus present Sensory:   Normal to touch, pinprick and DSS, and temperature and vibration mildly decreased in both lower extremities. Gait:  Abnormal gait for age, as she does move slowly due to arthritis in her back, walk with a limp on her right leg, and normally uses a cane for ambulation. Tremor:   Mild intention tremor noted. Cerebellar:  Mldly abnormal Romberg and tandem cerebellar signs present And finger-nose-finger examination shows no dysmetria. Neurovascular:  Normal heart sounds and regular rhythm, peripheral pulses decreased in both feet, and no carotid bruits. Assessment:  
 
 
Plan:  
 
Patient with no carotid Doppler study in 1 years, will get repeat Doppler study next week. She has a history of TIAs. No new recurrent TIA-like symptoms so far. Patient on clonazepam because of GI problems, try to switch her to BuSpar and see if we get her off the benzodiazepines. Because of her back pain I suggested she stay on the gabapentin 300 mg twice a day since she is to be tolerating that well without drowsiness side effect and does seem be helping the pain. We discussed her condition with the patient and her daughter in detail. We encourage her to remain active and try to do her back exercises in addition and continue seeing her pain management physician Patient is encouraged to continue her vitamins, vitamin D, remain mentally and physically active, and start exercising more 35 minutes spent with patient and the daughter, and advised her that she has a progressive degenerative brain condition that will only get worse with time. Patient is to continue her clonazepam taper and start the BuSpar 3 times a day. CT of the head and metabolic parameters were all normal last visit 6 months ago Patient is to continue her other medication and try to continue a regular exercise program 
 Patient is to see her pain management specialist, for continued treatment and spinal cord stimulator followup Patient is to continue current medications and start exercising more We discussed with her and her daughter in detail, if there is any doubt about her driving ability she should stop immediately or get tested by [facility] Patient will be seen again in 6 months time or earlier as needed. She will call if any problem in the interim. Discuss her condition with patient and daughters at length,  
Time of visit was 35 minutes today She is to keep the lights on in the house at night and be careful when she moves or changes position quickly She will call if any problem in the interim. Signed By: Brooklyn Cervantes MD   
 August 16, 2019 This note will not be viewable in [address] If you have questions, please do not hesitate to call me. I look forward to following Ms. Kateryna Cody along with you. Sincerely, Brooklyn Cervantes MD

## 2019-08-16 NOTE — PATIENT INSTRUCTIONS

## 2019-08-17 NOTE — PROGRESS NOTES
Consult    Subjective:     Karina Hernandez is a 80 y. o.right-handed  female seen today at the request of Dr. Joel Valles for evaluation of new problem of increasing difficulty with back pain and had a spinal cord stimulator inserted which is not helped all that much so far, may be a little, and so far she had recent radiofrequency ablation of the spinal nerve, and a medial branch denervation procedure or injection to help control her pain which has helped some. She is going to see her pain doctor Dr. Jonathan Baker in another 2 weeks. Her PCP is also cut down her gabapentin to twice a day and will try to get her off of it, after long discussion I told the patient that it is helping her pain is better than narcotics and she should continue on the medication we will refill that for her today. Her PCP is retiring. She also is still on the clonazepam, and we reviewed the black box warning of doubling the risk of death, we suggest that she go back on the BuSpar 10 mg 3 times a day and try to get off the narcotics and her family wholeheartedly supports the same thing. Particularly if she is going to be giving some narcotics she cannot mix the benzos with those. She has moved out of her daughter's house is now living alone, and seem to be doing okay. Her memory seems to be okay also and she has no major worsening of the memory. Her swallowing seems better after GI evaluation. We will see if that can help control her symptoms of her GI side effects for which she takes the clonazepam.  At that does not work we told her to check with her GI doctor about other medications. She had a previous history of TIAs, and has not had a Doppler study in 1 years, so we reordered that in addition to her carotid stenosis. Her PCP is monitoring her blood pressure, cholesterol, and she does not smoke, and she try to watch her diet. She takes aspirin daily. She is on blood pressure pills and cholesterol medications.   She has chronic back pain, and is under pain management. She is also trying to come down on her gabapentin, with the feeling that but some of her medication may be causing her not to swallow so well. We will cut her down to 300 mg 2 times a day and gave her a liquid form since she can swallow that better than the pill form and the 600 mg tablet which is large. We discussed her condition with the patient and her daughter at length. She continues to take the Aricept, does not think she needs any new medication. Her daughter feels that she needs medication for her behavior and agitation. We will try the BuSpar. She continues her vitamins and vitamin D on a regular basis. She also takes B12. .  She has had no more falls, and continues a regular exercise program.  That all seems to be somewhat better. The patient's family was concerned about her taking too much medication, and we will make the changes in the clonazepam and Neurontin as above. She denies any headache, any increased neck pain, but does have chronic low back pain and post lumbar laminectomy syndrome and is followed by pain management physician with intermittent injections in her spine. This may be also contributing to her gait instability. She had a normal CT of the head 12 months ago because of her ataxia, and did physical therapy. She still drives and has not had an accident but only drives locally. She feels no problem doing it. She has also seen for increasing back pain and has spinal cord stimulator insertion for post lumbar laminectomy syndrome. Patient had spinal cord stimulator inserted in April 2016, and is still recovering slowly, and she see her pain management doctor for increasing back pain. She has weakness in her legs and difficulty walking, and she had an MRI scan of the lumbar spine apparently shows a disc and spinal stenosis.  She doesn't seem to have much new weakness in the legs, and her bowel and bladder function is stable except for increasing difficulty emptying her bladder. She is seeing a urologist. Because of her increased pain she sees Dr. Benji Thompson, her pain management specialist, who has treated her in the past.  She is not exercising like she is supposed too,but she says she will do it on her own now. She had previous back surgery and has a postlaminectomy syndrome, and now a new lumbar disc and spinal stenosis. Besides the weakness in her legs, she's had no other new weakness, sensory loss visual changes or headaches, or other new neurological problems except for the neurogenic bladder. She is using a cane or walker for ambulation because of her back pain. She Has had no other new medical problems complications or illnesses except for a bout of diverticulitis. She is on Aricept 10 mg a day and is on clonazepam .5 mg each day. She no longer uses Ambien for sleep and takes vitamins regularly. Her past medical history is pertinent for hypertension, depression, GERD, asthma, arthritis, post laminectomy syndrome, lumbar radiculopathy, a central tremor, dementia, ataxic gait, cataract surgery, cervical fusion, right carpal tunnel syndrome, post cervical laminectomy fusion. Hysterectomy and bilateral salpingo-oophorectomy, gallbladder surgery, right carpal tunnel surgery.     Past Medical History:   Diagnosis Date    Arthritis     Asthma     Chronic pain     GERD (gastroesophageal reflux disease)     Hypertension     Vertigo       Past Surgical History:   Procedure Laterality Date    HX APPENDECTOMY      HX BACK SURGERY  05/21/2015    stimulator [employer]     HX CATARACT REMOVAL      bilateral    HX CERVICAL FUSION      anterior disc fusion    HX HEENT      uppp    HX LAP CHOLECYSTECTOMY      HX ORTHOPAEDIC      right carpal tunnel release    HX MATT AND BSO       Family History   Problem Relation Age of Onset    Stroke Mother     Cancer Father       Social History     Tobacco Use    Smoking status: Former Smoker    Smokeless tobacco: Never Used    Tobacco comment: stopped 25 plus years ago   Substance Use Topics    Alcohol use: No       Current Outpatient Medications   Medication Sig Dispense Refill    traMADol (ULTRAM) 50 mg tablet Take 50 mg by mouth every six (6) hours as needed for Pain.  busPIRone (BUSPAR) 10 mg tablet Take 1 Tab by mouth three (3) times daily as needed (anxiety). 100 Tab 5    gabapentin (NEURONTIN) 300 mg capsule Take 1 Cap by mouth two (2) times a day. Max Daily Amount: 600 mg. 100 Cap 5    donepezil (ARICEPT) 10 mg tablet Take 1 Tab by mouth daily. 90 Tab 3    PROAIR RESPICLICK 90 mcg/actuation aepb INL 2 PFS PO Q 4 H PRN  6    fluticasone-salmeterol 232-14 mcg/actuation aepb INL 2 PFS PO BID  12    montelukast (SINGULAIR) 10 mg tablet TK 1 T PO D  3    clonazePAM (KLONOPIN) 0.5 mg tablet Take 0.5 Tabs by mouth nightly. Max Daily Amount: 0.25 mg. 30 Tab 11    CYANOCOBALAMIN, VITAMIN B-12, (VITAMIN B-12 PO) Take  by mouth daily.  aspirin delayed-release 81 mg tablet Take  by mouth daily.  colestipol (COLESTID) 1 gram tablet Take 1 g by mouth two (2) times a day.  EG-TG-SH-Fe-Min-Lycopen-Lutein (CENTRUM) 0.4-162-18 mg tab Take  by mouth.  traZODone (DESYREL) 50 mg tablet Take 50 mg by mouth nightly.  dicyclomine (BENTYL) 10 mg capsule Take 10 mg by mouth four (4) times daily.  losartan (COZAAR) 100 mg tablet Take 100 mg by mouth daily.  sertraline (ZOLOFT) 100 mg tablet Take 100 mg by mouth every morning.  omeprazole (PRILOSEC) 40 mg capsule Take 40 mg by mouth daily.  budesonide-formoterol (SYMBICORT) 160-4.5 mcg/Actuation HFA inhaler Take 2 Puffs by inhalation as needed.           Allergies   Allergen Reactions    Adhesive Tape Other (comments)     hurts the area it is placed on        Review of Systems:  A comprehensive review of systems was negative except for: Musculoskeletal: positive for myalgias, arthralgias, stiff joints and back pain  Neurological: positive for memory problems and tremor  Behvioral/Psych: positive for anxiety and depression     Objective:     I      NEUROLOGICAL EXAM:    Appearance: The patient is well developed, well nourished, provides a fair history and is in no acute distress. Mental Status: Oriented to time, place and person and the president, but misses the day of the month. Her speech is 100% intelligible, and she has mild cognitive impairment because of her dementia. She can't remember only one of 3 words at 30 seconds with distraction, and cannot subtract serial sevens, or spell the word world backward, or draw a clock that shows a time 10 minutes to 11. Patient is a little slow mentally for some recent memory loss but actually functioning fairly well in her ADLs. Mood and affect slightly anxious and depressed. Cranial Nerves:   Intact visual fields. Fundi are benign, discs are flat no lesions seen on funduscopy. . FRANK, EOM's full, no nystagmus, no ptosis. Facial sensation is normal. Corneal reflexes are not tested. Facial movement is symmetric. Hearing is abnormal bilaterally. Palate is midline with normal sternocleidomastoid and trapezius muscles are normal. Tongue is midline. Neck without meningismus or bruits  Temporal arteries are not tender or enlarged   Motor:  4/5 strength in upper and lower proximal and distal muscles. Normal bulk and tone. No fasciculations. Patient has negative straight leg raising test negative in the sitting position but has percussion tenderness over the lumbar spine  Patient had a spinal stimulator in the lumbar spine with stable surgical incisions  Rapid altering movement is slow in all extremities   Reflexes:   Deep tendon reflexes 1+/4 and symmetrical. No Babinski or clonus present   Sensory:   Normal to touch, pinprick and DSS, and temperature and vibration mildly decreased in both lower extremities.    Gait:  Abnormal gait for age, as she does move slowly due to arthritis in her back, walk with a limp on her right leg, and normally uses a cane for ambulation. Tremor:   Mild intention tremor noted. Cerebellar:  Mldly abnormal Romberg and tandem cerebellar signs present  And finger-nose-finger examination shows no dysmetria. Neurovascular:  Normal heart sounds and regular rhythm, peripheral pulses decreased in both feet, and no carotid bruits. Assessment:       Plan:     Patient with no carotid Doppler study in 1 years, will get repeat Doppler study next week. She has a history of TIAs. No new recurrent TIA-like symptoms so far. Patient on clonazepam because of GI problems, try to switch her to BuSpar and see if we get her off the benzodiazepines. Because of her back pain I suggested she stay on the gabapentin 300 mg twice a day since she is to be tolerating that well without drowsiness side effect and does seem be helping the pain. We discussed her condition with the patient and her daughter in detail. We encourage her to remain active and try to do her back exercises in addition and continue seeing her pain management physician  Patient is encouraged to continue her vitamins, vitamin D, remain mentally and physically active, and start exercising more  35 minutes spent with patient and the daughter, and advised her that she has a progressive degenerative brain condition that will only get worse with time. Patient is to continue her clonazepam taper and start the BuSpar 3 times a day.   CT of the head and metabolic parameters were all normal last visit 6 months ago  Patient is to continue her other medication and try to continue a regular exercise program  Patient is to see her pain management specialist, for continued treatment and spinal cord stimulator followup  Patient is to continue current medications and start exercising more   We discussed with her and her daughter in detail, if there is any doubt about her driving ability she should stop immediately or get tested by [facility]  Patient will be seen again in 6 months time or earlier as needed. She will call if any problem in the interim. Discuss her condition with patient and daughters at length,   Time of visit was 35 minutes today  She is to keep the lights on in the house at night and be careful when she moves or changes position quickly  She will call if any problem in the interim. Signed By: Shine Mcfarland MD     August 16, 2019       This note will not be viewable in [address]

## 2019-09-09 ENCOUNTER — TELEPHONE (OUTPATIENT)
Dept: NEUROLOGY | Age: 84
End: 2019-09-09

## 2019-09-09 NOTE — TELEPHONE ENCOUNTER
----- Message from Dev Alaniz sent at 9/9/2019 12:01 PM EDT -----  Regarding: Dr. Lola Trotter Message/Vendor Calls    Caller's first and last name:Vita Interiano/Daughter      Reason for call: pt daughter is requesting a call back before the earlier message of calling the pt       Callback required yes/no and why: yes      Best contact number(s): [phone]      Details to clarify the request:      Dev Alaniz

## 2019-09-09 NOTE — TELEPHONE ENCOUNTER
I called the patient's daughter and she expressed the same concerns that the patient did in earlier conversation.    I advised that I have sent this to Dr. Juan Warren to review and I will call back to discuss further

## 2019-09-09 NOTE — TELEPHONE ENCOUNTER
----- Message from Carmelita Paige sent at 9/9/2019 12:01 PM EDT -----  Regarding: Dr. Ilsa Sanchez Message/Vendor Calls    Caller's first and last name:Vita Interiano/Daughter      Reason for call: pt daughter is requesting a call back before the earlier message of calling the pt       Callback required yes/no and why: yes      Best contact number(s): [phone]      Details to clarify the request:      Carmelita Paige

## 2019-09-10 ENCOUNTER — TELEPHONE (OUTPATIENT)
Dept: NEUROLOGY | Age: 84
End: 2019-09-10

## 2019-09-11 NOTE — TELEPHONE ENCOUNTER
Patients daughter called back she would like for pt to be seen next week. Daughter states that pt was seen by Eunice Chou states its not the pts medications and that something else is going on.

## 2019-09-12 NOTE — TELEPHONE ENCOUNTER
I called the patient's daughter. There is another phone call that was sent to Dr. Yonas Cho, but then was taken and sent back. Started 8/16/19 Buspar taking it 3 times daily. She started at 2 times daily. She started to feel the symptoms below even then. Thought she might not be taking enough so she increased  Nervious stomach about walking: wobbly when standing; sleeping poorly; talking like she is spaced out, more memory loss/hard to remember what said; most restless  Weaned off clonazepam. Stopped as of 9/2/19  Saw Dr. Crys Chapman on the 8/28/19  She was on Ambien to sleep, changed to trazodone 50mg at night. Gabapentin 300mg twice daily. None of this is new in her medication changes. She does think that Tylenol helps when she has trouble and seems to help. Patient has had GI symptoms for a long time as well. They called psychiatrist because she could not get an answer from our office. He stated that this should not be her medications. They will be going to PCP tomorrow to discuss as well.

## 2019-09-12 NOTE — TELEPHONE ENCOUNTER
Per the patient's daughter: the patient was ok in the beginning, she noticed a big change over this past weekend starting Saturday 9/7/19. Legs shaking so badly that she couldn't walk well. Not feeling like herself and knows something is wrong. We discussed her options and advised she should go to the PCP to be assessed to make sure nothing physically is causing her symptoms. However, if the patient gets worse over the day, she needs to go to the ER to be assessed urgently. She agreed to this. She will also call me back on Monday to give me an update to see if we need to have her come in to the office to be seen urgently if everything comes back negative. She wanted know if the patient should stop the Buspar completely. The patient has known anxiety and sees a psychiatrist. I advised this would not be a good idea as this may not be the medication and to stop an anxiety medication abruptly would not be good for her mentally. Further treatment changes may need to be done by psychiatrist. They will continue the Buspar twice daily until the patient is seen by PCP if they do any changes.     Please review

## 2019-09-13 ENCOUNTER — TELEPHONE (OUTPATIENT)
Dept: NEUROLOGY | Age: 84
End: 2019-09-13

## 2019-09-13 NOTE — TELEPHONE ENCOUNTER
Emily Velazquez NP is requesting for patient to be seen next week by Dr. Morena Tang because of recent adjustments to the patients medications. Please call patient to schedule the appt.

## 2019-09-16 ENCOUNTER — TELEPHONE (OUTPATIENT)
Dept: NEUROLOGY | Age: 84
End: 2019-09-16

## 2019-09-17 ENCOUNTER — OFFICE VISIT (OUTPATIENT)
Dept: NEUROLOGY | Age: 84
End: 2019-09-17

## 2019-09-17 VITALS
RESPIRATION RATE: 16 BRPM | SYSTOLIC BLOOD PRESSURE: 146 MMHG | WEIGHT: 169 LBS | HEART RATE: 68 BPM | OXYGEN SATURATION: 98 % | DIASTOLIC BLOOD PRESSURE: 70 MMHG | BODY MASS INDEX: 28.16 KG/M2 | HEIGHT: 65 IN

## 2019-09-17 DIAGNOSIS — F32.A DEPRESSION, UNSPECIFIED DEPRESSION TYPE: ICD-10-CM

## 2019-09-17 DIAGNOSIS — F33.9 RECURRENT DEPRESSION (HCC): ICD-10-CM

## 2019-09-17 DIAGNOSIS — G30.0 DEMENTIA IN ALZHEIMER'S DISEASE WITH EARLY ONSET WITHOUT BEHAVIORAL DISTURBANCE (HCC): ICD-10-CM

## 2019-09-17 DIAGNOSIS — G25.0 BENIGN ESSENTIAL TREMOR SYNDROME: ICD-10-CM

## 2019-09-17 DIAGNOSIS — M96.1 LUMBAR POST-LAMINECTOMY SYNDROME: ICD-10-CM

## 2019-09-17 DIAGNOSIS — G30.1 LATE ONSET ALZHEIMER'S DISEASE WITHOUT BEHAVIORAL DISTURBANCE (HCC): ICD-10-CM

## 2019-09-17 DIAGNOSIS — F02.80 LATE ONSET ALZHEIMER'S DISEASE WITHOUT BEHAVIORAL DISTURBANCE (HCC): ICD-10-CM

## 2019-09-17 DIAGNOSIS — R26.0 ATAXIC GAIT: ICD-10-CM

## 2019-09-17 DIAGNOSIS — F02.80 DEMENTIA IN ALZHEIMER'S DISEASE WITH EARLY ONSET WITHOUT BEHAVIORAL DISTURBANCE (HCC): ICD-10-CM

## 2019-09-17 DIAGNOSIS — M48.061 SPINAL STENOSIS OF LUMBAR REGION WITHOUT NEUROGENIC CLAUDICATION: ICD-10-CM

## 2019-09-17 DIAGNOSIS — M79.7 FIBROMYALGIA: ICD-10-CM

## 2019-09-17 DIAGNOSIS — M54.16 LUMBAR RADICULAR PAIN: ICD-10-CM

## 2019-09-17 DIAGNOSIS — I67.89 CEREBRAL MICROVASCULAR DISEASE: Primary | ICD-10-CM

## 2019-09-17 DIAGNOSIS — E55.9 VITAMIN D DEFICIENCY: ICD-10-CM

## 2019-09-17 DIAGNOSIS — I65.23 STENOSIS OF BOTH INTERNAL CAROTID ARTERIES: ICD-10-CM

## 2019-09-17 RX ORDER — BUSPIRONE HYDROCHLORIDE 10 MG/1
10 TABLET ORAL
Qty: 100 TAB | Refills: 5 | Status: SHIPPED | OUTPATIENT
Start: 2019-09-17 | End: 2019-11-16

## 2019-09-17 NOTE — LETTER
9/17/2019 9:24 PM 
 
Patient:  Devin Smiley YOB: 1934 Date of Visit: 9/17/2019 Dear No Recipients: Thank you for referring Ms. Devin Smiley to me for evaluation/treatment. Below are the relevant portions of my assessment and plan of care. Consult Subjective:  
 
Devin Smiley is a 80 y. o.right-handed  female seen today at the request of Dr. Sal Khan for urgent work in evaluation of new problem of increasing tremors and tachycardia and agitation after coming off the clonazepam 0.5 mg taking 1/2 tablet twice a day. She was started on BuSpar 10 mg 3 times a day, but that did not stop all of her withdrawal.  Over the next 3 days she slowly improved, and now she is much better, feels more alert off the clonazepam, and is sleeping well at nighttime. She wants to increase her BuSpar to 10 mg 3 times a day and I told her that would be fine. She wants to stay off the clonazepam if all possible because she feels better off of it. She was told to call us for evaluation by her psychiatrist and her medical doctors. She had no seizure activity, never lost consciousness, and her strength and gait is markedly improving. She is try to exercise more. She would like a home health referral to we will give her home health for PT for gait training and strengthening. She is also seen for increasing difficulty with back pain and is scheduled for an epidural steroid injection in the near future. Patient had a spinal cord stimulator inserted which is not helped all that much so far, may be a little, and so far she had recent radiofrequency ablation of the spinal nerve, and a medial branch denervation procedure or injection to help control her pain which has helped some. She is going to see her pain doctor Dr. Marichuy Elaine in another week. We may be able to cut down her narcotics next. .  Particularly if she is going to be giving some narcotics she cannot mix the benzos with those. She has moved out of her daughter's house is now living alone, and seem to be doing okay. Her memory seems to be okay also and she has no major worsening of the memory. Her swallowing seems better after GI evaluation. We will see if that can help control her symptoms of her GI side effects for which she takes the clonazepam.  At that does not work we told her to check with her GI doctor about other medications. She had a previous history of TIAs, and has not had a Doppler study in 1 years, so we reordered that in addition to her carotid stenosis. Her PCP is monitoring her blood pressure, cholesterol, and she does not smoke, and she try to watch her diet. She takes aspirin daily. She is on blood pressure pills and cholesterol medications. She has chronic back pain, and is under pain management. She is also trying to come down on her gabapentin, with the feeling that but some of her medication may be causing her not to swallow so well. We will cut her down to 300 mg 2 times a day and gave her a liquid form since she can swallow that better than the pill form and the 600 mg tablet which is large. We discussed her condition with the patient and her daughter at length. She continues to take the Aricept, does not think she needs any new medication. Her daughter feels that she needs medication for her behavior and agitation. We will try the BuSpar. She continues her vitamins and vitamin D on a regular basis. She also takes B12. .  She has had no more falls, and continues a regular exercise program.  That all seems to be somewhat better. The patient's family was concerned about her taking too much medication, and we will make the changes in the clonazepam and Neurontin as above. She denies any headache, any increased neck pain, but does have chronic low back pain and post lumbar laminectomy syndrome and is followed by pain management physician with intermittent injections in her spine.   This may be also contributing to her gait instability. She had a normal CT of the head 12 months ago because of her ataxia, and did physical therapy. She still drives and has not had an accident but only drives locally. She feels no problem doing it. She has also seen for increasing back pain and has spinal cord stimulator insertion for post lumbar laminectomy syndrome. Patient had spinal cord stimulator inserted in April 2016, and is still recovering slowly, and she see her pain management doctor for increasing back pain. She has weakness in her legs and difficulty walking, and she had an MRI scan of the lumbar spine apparently shows a disc and spinal stenosis. She doesn't seem to have much new weakness in the legs, and her bowel and bladder function is stable except for increasing difficulty emptying her bladder. She is seeing a urologist. Because of her increased pain she sees Dr. Amrik Beaver, her pain management specialist, who has treated her in the past.  She is not exercising like she is supposed too,but she says she will do it on her own now. She had previous back surgery and has a postlaminectomy syndrome, and now a new lumbar disc and spinal stenosis. Besides the weakness in her legs, she's had no other new weakness, sensory loss visual changes or headaches, or other new neurological problems except for the neurogenic bladder. She is using a cane or walker for ambulation because of her back pain. She Has had no other new medical problems complications or illnesses except for a bout of diverticulitis. She is on Aricept 10 mg a day and is on clonazepam .5 mg each day. She no longer uses Ambien for sleep and takes vitamins regularly.  Her past medical history is pertinent for hypertension, depression, GERD, asthma, arthritis, post laminectomy syndrome, lumbar radiculopathy, a central tremor, dementia, ataxic gait, cataract surgery, cervical fusion, right carpal tunnel syndrome, post cervical laminectomy fusion. Hysterectomy and bilateral salpingo-oophorectomy, gallbladder surgery, right carpal tunnel surgery. Past Medical History:  
Diagnosis Date  Arthritis  Asthma  Chronic pain  GERD (gastroesophageal reflux disease)  Hypertension  Vertigo Past Surgical History:  
Procedure Laterality Date  HX APPENDECTOMY Aria [facility] BACK SURGERY  05/21/2015  
 stimulator [employer]  HX CATARACT REMOVAL    
 bilateral  
 HX CERVICAL FUSION    
 anterior disc fusion  HX HEENT    
 uppp  HX LAP CHOLECYSTECTOMY  HX ORTHOPAEDIC    
 right carpal tunnel release  HX MATT AND BSO Family History Problem Relation Age of Onset  Stroke Mother  Cancer Father Social History Tobacco Use  Smoking status: Former Smoker  Smokeless tobacco: Never Used  Tobacco comment: stopped 25 plus years ago Substance Use Topics  Alcohol use: No  
   
Current Outpatient Medications Medication Sig Dispense Refill  busPIRone (BUSPAR) 10 mg tablet Take 1 Tab by mouth three (3) times daily as needed (anxiety). 100 Tab 5  
 traMADol (ULTRAM) 50 mg tablet Take 50 mg by mouth every six (6) hours as needed for Pain.  gabapentin (NEURONTIN) 300 mg capsule Take 1 Cap by mouth two (2) times a day. Max Daily Amount: 600 mg. 100 Cap 5  
 donepezil (ARICEPT) 10 mg tablet Take 1 Tab by mouth daily. 90 Tab 3  
 PROAIR RESPICLICK 90 mcg/actuation aepb INL 2 PFS PO Q 4 H PRN  6  
 fluticasone-salmeterol 232-14 mcg/actuation aepb INL 2 PFS PO BID  12  
 montelukast (SINGULAIR) 10 mg tablet TK 1 T PO D  3  
 CYANOCOBALAMIN, VITAMIN B-12, (VITAMIN B-12 PO) Take  by mouth daily.  aspirin delayed-release 81 mg tablet Take  by mouth daily.  colestipol (COLESTID) 1 gram tablet Take 1 g by mouth two (2) times a day.  ZN-YT-TY-Fe-Min-Lycopen-Lutein (CENTRUM) 0.4-162-18 mg tab Take  by mouth.  traZODone (DESYREL) 50 mg tablet Take 50 mg by mouth nightly.  dicyclomine (BENTYL) 10 mg capsule Take 10 mg by mouth four (4) times daily.  losartan (COZAAR) 100 mg tablet Take 100 mg by mouth daily.  sertraline (ZOLOFT) 100 mg tablet Take 100 mg by mouth every morning.  omeprazole (PRILOSEC) 40 mg capsule Take 40 mg by mouth daily.  budesonide-formoterol (SYMBICORT) 160-4.5 mcg/Actuation HFA inhaler Take 2 Puffs by inhalation as needed. Allergies Allergen Reactions  Adhesive Tape Other (comments)  
  hurts the area it is placed on Review of Systems: A comprehensive review of systems was negative except for: Musculoskeletal: positive for myalgias, arthralgias, stiff joints and back pain Neurological: positive for memory problems and tremor Behvioral/Psych: positive for anxiety and depression Objective: I 
 
 
NEUROLOGICAL EXAM: 
 
Appearance: The patient is well developed, well nourished, provides a fair history and is in no acute distress. Mental Status: Oriented to time, place and person and the president, but misses the day of the month. Her speech is 100% intelligible, and she has mild cognitive impairment because of her dementia. She can't remember only one of 3 words at 30 seconds with distraction, and cannot subtract serial sevens, or spell the word world backward, or draw a clock that shows a time 10 minutes to 11. Patient is a little slow mentally for some recent memory loss but actually functioning fairly well in her ADLs. Mood and affect slightly anxious and depressed. Cranial Nerves:   Intact visual fields. Fundi are benign, discs are flat no lesions seen on funduscopy. . FRANK, EOM's full, no nystagmus, no ptosis. Facial sensation is normal. Corneal reflexes are not tested. Facial movement is symmetric. Hearing is abnormal bilaterally. Palate is midline with normal sternocleidomastoid and trapezius muscles are normal. Tongue is midline. Neck without meningismus or bruits Temporal arteries are not tender or enlarged Motor:  4/5 strength in upper and lower proximal and distal muscles. Normal bulk and tone. No fasciculations. Patient has negative straight leg raising test negative in the sitting position but has percussion tenderness over the lumbar spine Patient had a spinal stimulator in the lumbar spine with stable surgical incisions Rapid altering movement is slow in all extremities Reflexes:   Deep tendon reflexes 1+/4 and symmetrical. No Babinski or clonus present Sensory:   Normal to touch, pinprick and DSS, and temperature and vibration mildly decreased in both lower extremities. Gait:  Abnormal gait for age, as she does move slowly due to arthritis in her back, walk with a limp on her right leg, and normally uses a cane for ambulation. Tremor:   Mild intention tremor noted. Cerebellar:  Mldly abnormal Romberg and tandem cerebellar signs present And finger-nose-finger examination shows no dysmetria. Neurovascular:  Normal heart sounds and regular rhythm, peripheral pulses decreased in both feet, and no carotid bruits. Assessment:  
 
 
Plan: She with new problem of sudden withdrawal off the clonazepam, but she is gotten through that now, feels better, will continue the BuSpar 10 mg 3 times a day and see how she does. Patient feels better, more alert, and is sleeping better, will get home health to help her with gait training and strengthening but overall she is feeling better. Patient had carotid Doppler studies that showed stable mild disease only last month. She is had no recurrent TIA symptoms. Her GI problems seem stable on BuSpar and off the clonazepam at this time. Because of her back pain I suggested she stay on the gabapentin 300 mg twice a day since she is to be tolerating that well without drowsiness side effect and does seem be helping the pain. We discussed her condition with the patient and her daughter in detail. We encourage her to remain active and try to do her back exercises in addition and continue seeing her pain management physician Patient is encouraged to continue her vitamins, vitamin D, remain mentally and physically active, and start exercising more 35 minutes spent with patient and the daughter, and advised her that she has a progressive degenerative brain condition that will only get worse with time. CT of the head and metabolic parameters were all normal last visit 6 months ago Patient is to continue her other medication and try to continue a regular exercise program 
Patient is to see her pain management specialist, for continued treatment and spinal cord stimulator followup Patient is to continue current medications and start exercising more We discussed with her and her daughter in detail, if there is any doubt about her driving ability she should stop immediately or get tested by [facility] Patient will be seen again in 6 months time or earlier as needed. She will call if any problem in the interim. Discuss her condition with patient and daughters at length,  
Time of visit was 35 minutes today She is to keep the lights on in the house at night and be careful when she moves or changes position quickly She will call if any problem in the interim. Signed By: Luz Aldridge MD   
 September 17, 2019 This note will not be viewable in [address] If you have questions, please do not hesitate to call me. I look forward to following Ms. Edwards along with you. Sincerely, Luz Aldridge MD

## 2019-09-17 NOTE — PATIENT INSTRUCTIONS
A Healthy Lifestyle: Care Instructions  Your Care Instructions    A healthy lifestyle can help you feel good, stay at a healthy weight, and have plenty of energy for both work and play. A healthy lifestyle is something you can share with your whole family. A healthy lifestyle also can lower your risk for serious health problems, such as high blood pressure, heart disease, and diabetes. You can follow a few steps listed below to improve your health and the health of your family. Follow-up care is a key part of your treatment and safety. Be sure to make and go to all appointments, and call your doctor if you are having problems. It's also a good idea to know your test results and keep a list of the medicines you take. How can you care for yourself at home? · Do not eat too much sugar, fat, or fast foods. You can still have dessert and treats now and then. The goal is moderation. · Start small to improve your eating habits. Pay attention to portion sizes, drink less juice and soda pop, and eat more fruits and vegetables. ? Eat a healthy amount of food. A 3-ounce serving of meat, for example, is about the size of a deck of cards. Fill the rest of your plate with vegetables and whole grains. ? Limit the amount of soda and sports drinks you have every day. Drink more water when you are thirsty. ? Eat at least 5 servings of fruits and vegetables every day. It may seem like a lot, but it is not hard to reach this goal. A serving or helping is 1 piece of fruit, 1 cup of vegetables, or 2 cups of leafy, raw vegetables. Have an apple or some carrot sticks as an afternoon snack instead of a candy bar. Try to have fruits and/or vegetables at every meal.  · Make exercise part of your daily routine. You may want to start with simple activities, such as walking, bicycling, or slow swimming. Try to be active 30 to 60 minutes every day. You do not need to do all 30 to 60 minutes all at once.  For example, you can exercise 3 times a day for 10 or 20 minutes. Moderate exercise is safe for most people, but it is always a good idea to talk to your doctor before starting an exercise program.  · Keep moving. [facility] the lawn, work in the garden, or [email or web address]. Take the stairs instead of the elevator at work. · If you smoke, quit. People who smoke have an increased risk for heart attack, stroke, cancer, and other lung illnesses. Quitting is hard, but there are ways to boost your chance of quitting tobacco for good. ? Use nicotine gum, patches, or lozenges. ? Ask your doctor about stop-smoking programs and medicines. ? Keep trying. In addition to reducing your risk of diseases in the future, you will notice some benefits soon after you stop using tobacco. If you have shortness of breath or asthma symptoms, they will likely get better within a few weeks after you quit. · Limit how much alcohol you drink. Moderate amounts of alcohol (up to 2 drinks a day for men, 1 drink a day for women) are okay. But drinking too much can lead to liver problems, high blood pressure, and other health problems. Family health  If you have a family, there are many things you can do together to improve your health. · Eat meals together as a family as often as possible. · Eat healthy foods. This includes fruits, vegetables, lean meats and dairy, and whole grains. · Include your family in your fitness plan. Most people think of activities such as jogging or tennis as the way to fitness, but there are many ways you and your family can be more active. Anything that makes you breathe hard and gets your heart pumping is exercise. Here are some tips:  ? Walk to do errands or to take your child to school or the bus.  ? Go for a family bike ride after dinner instead of watching TV. Where can you learn more? Go to http://martha-giuseppe.info/. Enter W610 in the search box to learn more about \"A Healthy Lifestyle: Care Instructions. \"  Current as of: September 11, 2018  Content Version: 12.1  © 4035-2668 [employer], [employer]. Care instructions adapted under license by [employer] (which disclaims liability or warranty for this information). If you have questions about a medical condition or this instruction, always ask your healthcare professional. [address] any warranty or liability for your use of this information. Office Policies    o Phone calls/patient messages:  Please allow up to 24 hours for someone in the office to contact you about your call or message. Be mindful your provider may be out of the office or your message may require further review. We encourage you to use [employer] for your messages as this is a faster, more efficient way to communicate with our office    o Medication Refills:  Prescription medications require up to 48 business hours to process. We encourage you to use [employer] for your refills. For controlled medications: Please allow up to 72 business hours to process. Certain medications may require you to  a written prescription at our office. NO narcotic/controlled medications will be prescribed after 4pm Monday through Friday or on weekends    o Form/Paperwork Completion:  We ask that you allow 7-14 business days. You may also download your forms to [employer] to have your doctor print off.

## 2019-09-18 NOTE — PROGRESS NOTES
Consult    Subjective:     Alden Caldera is a 80 y. o.right-handed  female seen today at the request of Dr. Poncho Shah for urgent work in evaluation of new problem of increasing tremors and tachycardia and agitation after coming off the clonazepam 0.5 mg taking 1/2 tablet twice a day. She was started on BuSpar 10 mg 3 times a day, but that did not stop all of her withdrawal.  Over the next 3 days she slowly improved, and now she is much better, feels more alert off the clonazepam, and is sleeping well at nighttime. She wants to increase her BuSpar to 10 mg 3 times a day and I told her that would be fine. She wants to stay off the clonazepam if all possible because she feels better off of it. She was told to call us for evaluation by her psychiatrist and her medical doctors. She had no seizure activity, never lost consciousness, and her strength and gait is markedly improving. She is try to exercise more. She would like a home health referral to we will give her home health for PT for gait training and strengthening. She is also seen for increasing difficulty with back pain and is scheduled for an epidural steroid injection in the near future. Patient had a spinal cord stimulator inserted which is not helped all that much so far, may be a little, and so far she had recent radiofrequency ablation of the spinal nerve, and a medial branch denervation procedure or injection to help control her pain which has helped some. She is going to see her pain doctor Dr. Kiara Dong in another week. We may be able to cut down her narcotics next. .  Particularly if she is going to be giving some narcotics she cannot mix the benzos with those. She has moved out of her daughter's house is now living alone, and seem to be doing okay. Her memory seems to be okay also and she has no major worsening of the memory. Her swallowing seems better after GI evaluation.  We will see if that can help control her symptoms of her GI side effects for which she takes the clonazepam.  At that does not work we told her to check with her GI doctor about other medications. She had a previous history of TIAs, and has not had a Doppler study in 1 years, so we reordered that in addition to her carotid stenosis. Her PCP is monitoring her blood pressure, cholesterol, and she does not smoke, and she try to watch her diet. She takes aspirin daily. She is on blood pressure pills and cholesterol medications. She has chronic back pain, and is under pain management. She is also trying to come down on her gabapentin, with the feeling that but some of her medication may be causing her not to swallow so well. We will cut her down to 300 mg 2 times a day and gave her a liquid form since she can swallow that better than the pill form and the 600 mg tablet which is large. We discussed her condition with the patient and her daughter at length. She continues to take the Aricept, does not think she needs any new medication. Her daughter feels that she needs medication for her behavior and agitation. We will try the BuSpar. She continues her vitamins and vitamin D on a regular basis. She also takes B12. .  She has had no more falls, and continues a regular exercise program.  That all seems to be somewhat better. The patient's family was concerned about her taking too much medication, and we will make the changes in the clonazepam and Neurontin as above. She denies any headache, any increased neck pain, but does have chronic low back pain and post lumbar laminectomy syndrome and is followed by pain management physician with intermittent injections in her spine. This may be also contributing to her gait instability. She had a normal CT of the head 12 months ago because of her ataxia, and did physical therapy. She still drives and has not had an accident but only drives locally. She feels no problem doing it.   She has also seen for increasing back pain and has spinal cord stimulator insertion for post lumbar laminectomy syndrome. Patient had spinal cord stimulator inserted in April 2016, and is still recovering slowly, and she see her pain management doctor for increasing back pain. She has weakness in her legs and difficulty walking, and she had an MRI scan of the lumbar spine apparently shows a disc and spinal stenosis. She doesn't seem to have much new weakness in the legs, and her bowel and bladder function is stable except for increasing difficulty emptying her bladder. She is seeing a urologist. Because of her increased pain she sees Dr. Anupama Hansen, her pain management specialist, who has treated her in the past.  She is not exercising like she is supposed too,but she says she will do it on her own now. She had previous back surgery and has a postlaminectomy syndrome, and now a new lumbar disc and spinal stenosis. Besides the weakness in her legs, she's had no other new weakness, sensory loss visual changes or headaches, or other new neurological problems except for the neurogenic bladder. She is using a cane or walker for ambulation because of her back pain. She Has had no other new medical problems complications or illnesses except for a bout of diverticulitis. She is on Aricept 10 mg a day and is on clonazepam .5 mg each day. She no longer uses Ambien for sleep and takes vitamins regularly. Her past medical history is pertinent for hypertension, depression, GERD, asthma, arthritis, post laminectomy syndrome, lumbar radiculopathy, a central tremor, dementia, ataxic gait, cataract surgery, cervical fusion, right carpal tunnel syndrome, post cervical laminectomy fusion. Hysterectomy and bilateral salpingo-oophorectomy, gallbladder surgery, right carpal tunnel surgery.     Past Medical History:   Diagnosis Date    Arthritis     Asthma     Chronic pain     GERD (gastroesophageal reflux disease)     Hypertension     Vertigo       Past Surgical History:   Procedure Laterality Date    HX APPENDECTOMY      HX BACK SURGERY  05/21/2015    stimulator [employer]     HX CATARACT REMOVAL      bilateral    HX CERVICAL FUSION      anterior disc fusion    HX HEENT      uppp    HX LAP CHOLECYSTECTOMY      HX ORTHOPAEDIC      right carpal tunnel release    HX MATT AND BSO       Family History   Problem Relation Age of Onset    Stroke Mother     Cancer Father       Social History     Tobacco Use    Smoking status: Former Smoker    Smokeless tobacco: Never Used    Tobacco comment: stopped 25 plus years ago   Substance Use Topics    Alcohol use: No       Current Outpatient Medications   Medication Sig Dispense Refill    busPIRone (BUSPAR) 10 mg tablet Take 1 Tab by mouth three (3) times daily as needed (anxiety). 100 Tab 5    traMADol (ULTRAM) 50 mg tablet Take 50 mg by mouth every six (6) hours as needed for Pain.  gabapentin (NEURONTIN) 300 mg capsule Take 1 Cap by mouth two (2) times a day. Max Daily Amount: 600 mg. 100 Cap 5    donepezil (ARICEPT) 10 mg tablet Take 1 Tab by mouth daily. 90 Tab 3    PROAIR RESPICLICK 90 mcg/actuation aepb INL 2 PFS PO Q 4 H PRN  6    fluticasone-salmeterol 232-14 mcg/actuation aepb INL 2 PFS PO BID  12    montelukast (SINGULAIR) 10 mg tablet TK 1 T PO D  3    CYANOCOBALAMIN, VITAMIN B-12, (VITAMIN B-12 PO) Take  by mouth daily.  aspirin delayed-release 81 mg tablet Take  by mouth daily.  colestipol (COLESTID) 1 gram tablet Take 1 g by mouth two (2) times a day.  RE-JV-IG-Fe-Min-Lycopen-Lutein (CENTRUM) 0.4-162-18 mg tab Take  by mouth.  traZODone (DESYREL) 50 mg tablet Take 50 mg by mouth nightly.  dicyclomine (BENTYL) 10 mg capsule Take 10 mg by mouth four (4) times daily.  losartan (COZAAR) 100 mg tablet Take 100 mg by mouth daily.  sertraline (ZOLOFT) 100 mg tablet Take 100 mg by mouth every morning.  omeprazole (PRILOSEC) 40 mg capsule Take 40 mg by mouth daily.       budesonide-formoterol (SYMBICORT) 160-4.5 mcg/Actuation HFA inhaler Take 2 Puffs by inhalation as needed. Allergies   Allergen Reactions    Adhesive Tape Other (comments)     hurts the area it is placed on        Review of Systems:  A comprehensive review of systems was negative except for: Musculoskeletal: positive for myalgias, arthralgias, stiff joints and back pain  Neurological: positive for memory problems and tremor  Behvioral/Psych: positive for anxiety and depression     Objective:     I      NEUROLOGICAL EXAM:    Appearance: The patient is well developed, well nourished, provides a fair history and is in no acute distress. Mental Status: Oriented to time, place and person and the president, but misses the day of the month. Her speech is 100% intelligible, and she has mild cognitive impairment because of her dementia. She can't remember only one of 3 words at 30 seconds with distraction, and cannot subtract serial sevens, or spell the word world backward, or draw a clock that shows a time 10 minutes to 11. Patient is a little slow mentally for some recent memory loss but actually functioning fairly well in her ADLs. Mood and affect slightly anxious and depressed. Cranial Nerves:   Intact visual fields. Fundi are benign, discs are flat no lesions seen on funduscopy. . FRANK, EOM's full, no nystagmus, no ptosis. Facial sensation is normal. Corneal reflexes are not tested. Facial movement is symmetric. Hearing is abnormal bilaterally. Palate is midline with normal sternocleidomastoid and trapezius muscles are normal. Tongue is midline. Neck without meningismus or bruits  Temporal arteries are not tender or enlarged   Motor:  4/5 strength in upper and lower proximal and distal muscles. Normal bulk and tone. No fasciculations.   Patient has negative straight leg raising test negative in the sitting position but has percussion tenderness over the lumbar spine  Patient had a spinal stimulator in the lumbar spine with stable surgical incisions  Rapid altering movement is slow in all extremities   Reflexes:   Deep tendon reflexes 1+/4 and symmetrical. No Babinski or clonus present   Sensory:   Normal to touch, pinprick and DSS, and temperature and vibration mildly decreased in both lower extremities. Gait:  Abnormal gait for age, as she does move slowly due to arthritis in her back, walk with a limp on her right leg, and normally uses a cane for ambulation. Tremor:   Mild intention tremor noted. Cerebellar:  Mldly abnormal Romberg and tandem cerebellar signs present  And finger-nose-finger examination shows no dysmetria. Neurovascular:  Normal heart sounds and regular rhythm, peripheral pulses decreased in both feet, and no carotid bruits. Assessment:       Plan:     She with new problem of sudden withdrawal off the clonazepam, but she is gotten through that now, feels better, will continue the BuSpar 10 mg 3 times a day and see how she does. Patient feels better, more alert, and is sleeping better, will get home health to help her with gait training and strengthening but overall she is feeling better. Patient had carotid Doppler studies that showed stable mild disease only last month. She is had no recurrent TIA symptoms. Her GI problems seem stable on BuSpar and off the clonazepam at this time. Because of her back pain I suggested she stay on the gabapentin 300 mg twice a day since she is to be tolerating that well without drowsiness side effect and does seem be helping the pain. We discussed her condition with the patient and her daughter in detail.   We encourage her to remain active and try to do her back exercises in addition and continue seeing her pain management physician  Patient is encouraged to continue her vitamins, vitamin D, remain mentally and physically active, and start exercising more  35 minutes spent with patient and the daughter, and advised her that she has a progressive degenerative brain condition that will only get worse with time. CT of the head and metabolic parameters were all normal last visit 6 months ago  Patient is to continue her other medication and try to continue a regular exercise program  Patient is to see her pain management specialist, for continued treatment and spinal cord stimulator followup  Patient is to continue current medications and start exercising more   We discussed with her and her daughter in detail, if there is any doubt about her driving ability she should stop immediately or get tested by [facility]  Patient will be seen again in 6 months time or earlier as needed. She will call if any problem in the interim. Discuss her condition with patient and daughters at length,   Time of visit was 35 minutes today  She is to keep the lights on in the house at night and be careful when she moves or changes position quickly  She will call if any problem in the interim. Signed By: Magdalena Thompson MD     September 17, 2019       This note will not be viewable in [address]

## 2019-09-20 ENCOUNTER — TELEPHONE (OUTPATIENT)
Dept: NEUROLOGY | Age: 84
End: 2019-09-20

## 2019-09-20 NOTE — TELEPHONE ENCOUNTER
----- Message from Monisha Ely sent at 9/20/2019  1:35 PM EDT -----  Regarding: /Telephone  General Message/Vendor Calls    Caller's first and last name: Corazon Munroe      Reason for call:      Callback required yes/no and why:yesquestions about pt's order      Best contact number(s): [phone]      Details to clarify the request: Jeremy Schwartz called from [facility] in regards to questions about orders, in regards the nurse check       Monisha Ely

## 2019-09-20 NOTE — TELEPHONE ENCOUNTER
I called Ángel Loud back and she is being seen once weekly to make sure taking her meds and living on her own.   This has been added as skilled nursing

## 2019-11-16 ENCOUNTER — HOSPITAL ENCOUNTER (EMERGENCY)
Age: 84
Discharge: HOME | End: 2019-11-16
Attending: EMERGENCY MEDICINE
Payer: MEDICARE

## 2019-11-16 VITALS
RESPIRATION RATE: 16 BRPM | DIASTOLIC BLOOD PRESSURE: 46 MMHG | TEMPERATURE: 98.1 F | WEIGHT: 170.42 LBS | OXYGEN SATURATION: 94 % | SYSTOLIC BLOOD PRESSURE: 130 MMHG | HEIGHT: 65 IN | HEART RATE: 77 BPM | BODY MASS INDEX: 28.39 KG/M2

## 2019-11-16 DIAGNOSIS — N39.0 URINARY TRACT INFECTION WITHOUT HEMATURIA, SITE UNSPECIFIED: Primary | ICD-10-CM

## 2019-11-16 DIAGNOSIS — R11.2 NON-INTRACTABLE VOMITING WITH NAUSEA, UNSPECIFIED VOMITING TYPE: ICD-10-CM

## 2019-11-16 LAB
ALBUMIN SERPL-MCNC: 3.5 G/DL (ref 3.5–5)
ALBUMIN/GLOB SERPL: 1.1 {RATIO} (ref 1.1–2.2)
ALP SERPL-CCNC: 86 U/L (ref 45–117)
ALT SERPL-CCNC: 25 U/L (ref 12–78)
ANION GAP SERPL CALC-SCNC: 12 MMOL/L (ref 5–15)
APPEARANCE UR: ABNORMAL
AST SERPL-CCNC: 19 U/L (ref 15–37)
BACTERIA URNS QL MICRO: ABNORMAL /HPF
BASOPHILS # BLD: 0 K/UL (ref 0–0.1)
BASOPHILS NFR BLD: 0 % (ref 0–1)
BILIRUB SERPL-MCNC: 0.6 MG/DL (ref 0.2–1)
BILIRUB UR QL: NEGATIVE
BUN SERPL-MCNC: 15 MG/DL (ref 6–20)
BUN/CREAT SERPL: 14 (ref 12–20)
CALCIUM SERPL-MCNC: 9.1 MG/DL (ref 8.5–10.1)
CHLORIDE SERPL-SCNC: 104 MMOL/L (ref 97–108)
CO2 SERPL-SCNC: 24 MMOL/L (ref 21–32)
COLOR UR: ABNORMAL
CREAT SERPL-MCNC: 1.05 MG/DL (ref 0.55–1.02)
DIFFERENTIAL METHOD BLD: ABNORMAL
EOSINOPHIL # BLD: 0.2 K/UL (ref 0–0.4)
EOSINOPHIL NFR BLD: 2 % (ref 0–7)
EPITH CASTS URNS QL MICRO: ABNORMAL /LPF
ERYTHROCYTE [DISTWIDTH] IN BLOOD BY AUTOMATED COUNT: 13.4 % (ref 11.5–14.5)
GLOBULIN SER CALC-MCNC: 3.1 G/DL (ref 2–4)
GLUCOSE SERPL-MCNC: 131 MG/DL (ref 65–100)
GLUCOSE UR STRIP.AUTO-MCNC: NEGATIVE MG/DL
HCT VFR BLD AUTO: 40 % (ref 35–47)
HGB BLD-MCNC: 13.1 G/DL (ref 11.5–16)
HGB UR QL STRIP: NEGATIVE
IMM GRANULOCYTES # BLD AUTO: 0.1 K/UL (ref 0–0.04)
IMM GRANULOCYTES NFR BLD AUTO: 1 % (ref 0–0.5)
KETONES UR QL STRIP.AUTO: NEGATIVE MG/DL
LACTATE BLD-SCNC: 1.3 MMOL/L (ref 0.4–2)
LEUKOCYTE ESTERASE UR QL STRIP.AUTO: ABNORMAL
LYMPHOCYTES # BLD: 0.4 K/UL (ref 0.8–3.5)
LYMPHOCYTES NFR BLD: 4 % (ref 12–49)
MCH RBC QN AUTO: 31.6 PG (ref 26–34)
MCHC RBC AUTO-ENTMCNC: 32.8 G/DL (ref 30–36.5)
MCV RBC AUTO: 96.6 FL (ref 80–99)
MONOCYTES # BLD: 0.6 K/UL (ref 0–1)
MONOCYTES NFR BLD: 5 % (ref 5–13)
MUCOUS THREADS URNS QL MICRO: ABNORMAL /LPF
NEUTS SEG # BLD: 9.8 K/UL (ref 1.8–8)
NEUTS SEG NFR BLD: 88 % (ref 32–75)
NITRITE UR QL STRIP.AUTO: NEGATIVE
NRBC # BLD: 0 K/UL (ref 0–0.01)
NRBC BLD-RTO: 0 PER 100 WBC
PH UR STRIP: 5.5 [PH] (ref 5–8)
PLATELET # BLD AUTO: 413 K/UL (ref 150–400)
PMV BLD AUTO: 10.6 FL (ref 8.9–12.9)
POTASSIUM SERPL-SCNC: 4.1 MMOL/L (ref 3.5–5.1)
PROT SERPL-MCNC: 6.6 G/DL (ref 6.4–8.2)
PROT UR STRIP-MCNC: NEGATIVE MG/DL
RBC # BLD AUTO: 4.14 M/UL (ref 3.8–5.2)
RBC #/AREA URNS HPF: ABNORMAL /HPF (ref 0–5)
SODIUM SERPL-SCNC: 140 MMOL/L (ref 136–145)
SP GR UR REFRACTOMETRY: 1.02 (ref 1–1.03)
UR CULT HOLD, URHOLD: NORMAL
UROBILINOGEN UR QL STRIP.AUTO: 0.2 EU/DL (ref 0.2–1)
WBC # BLD AUTO: 11.2 K/UL (ref 3.6–11)
WBC URNS QL MICRO: ABNORMAL /HPF (ref 0–4)

## 2019-11-16 PROCEDURE — 74011250636 HC RX REV CODE- 250/636: Performed by: EMERGENCY MEDICINE

## 2019-11-16 PROCEDURE — 96375 TX/PRO/DX INJ NEW DRUG ADDON: CPT

## 2019-11-16 PROCEDURE — 87040 BLOOD CULTURE FOR BACTERIA: CPT

## 2019-11-16 PROCEDURE — 81001 URINALYSIS AUTO W/SCOPE: CPT

## 2019-11-16 PROCEDURE — 36415 COLL VENOUS BLD VENIPUNCTURE: CPT

## 2019-11-16 PROCEDURE — 99285 EMERGENCY DEPT VISIT HI MDM: CPT

## 2019-11-16 PROCEDURE — 87186 SC STD MICRODIL/AGAR DIL: CPT

## 2019-11-16 PROCEDURE — 87077 CULTURE AEROBIC IDENTIFY: CPT

## 2019-11-16 PROCEDURE — 96365 THER/PROPH/DIAG IV INF INIT: CPT

## 2019-11-16 PROCEDURE — 74011000258 HC RX REV CODE- 258: Performed by: EMERGENCY MEDICINE

## 2019-11-16 PROCEDURE — 85025 COMPLETE CBC W/AUTO DIFF WBC: CPT

## 2019-11-16 PROCEDURE — 83605 ASSAY OF LACTIC ACID: CPT

## 2019-11-16 PROCEDURE — 87086 URINE CULTURE/COLONY COUNT: CPT

## 2019-11-16 PROCEDURE — 80053 COMPREHEN METABOLIC PANEL: CPT

## 2019-11-16 RX ORDER — NITROFURANTOIN 25; 75 MG/1; MG/1
CAPSULE ORAL
COMMUNITY
Start: 2019-11-15 | End: 2019-11-16

## 2019-11-16 RX ORDER — NITROFURANTOIN 25; 75 MG/1; MG/1
100 CAPSULE ORAL 2 TIMES DAILY
COMMUNITY
End: 2019-11-16

## 2019-11-16 RX ORDER — ONDANSETRON 2 MG/ML
4 INJECTION INTRAMUSCULAR; INTRAVENOUS ONCE
Status: COMPLETED | OUTPATIENT
Start: 2019-11-16 | End: 2019-11-16

## 2019-11-16 RX ORDER — CEFDINIR 300 MG/1
300 CAPSULE ORAL 2 TIMES DAILY
Qty: 20 CAP | Refills: 0 | Status: SHIPPED | OUTPATIENT
Start: 2019-11-16 | End: 2020-02-21 | Stop reason: ALTCHOICE

## 2019-11-16 RX ORDER — DONEPEZIL HYDROCHLORIDE 10 MG/1
TABLET, FILM COATED ORAL
COMMUNITY
Start: 2013-01-09 | End: 2019-11-16

## 2019-11-16 RX ADMIN — ONDANSETRON 4 MG: 2 INJECTION INTRAMUSCULAR; INTRAVENOUS at 11:25

## 2019-11-16 RX ADMIN — CEFTRIAXONE 1 G: 1 INJECTION, POWDER, FOR SOLUTION INTRAMUSCULAR; INTRAVENOUS at 12:20

## 2019-11-16 NOTE — ED TRIAGE NOTES
Pt. States she went to PCP yesterday and dx with UTI, pt. Woke up with nausea and chills and has vomited x2. Pt. Took a tylenol at 0800. Pt.  Has taken 1 antibiotic

## 2019-11-16 NOTE — DISCHARGE INSTRUCTIONS
Patient Education        Nausea and Vomiting: Care Instructions  Your Care Instructions    When you are nauseated, you may feel weak and sweaty and notice a lot of saliva in your mouth. Nausea often leads to vomiting. Most of the time you do not need to worry about nausea and vomiting, but they can be signs of other illnesses. Two common causes of nausea and vomiting are stomach flu and food poisoning. Nausea and vomiting from viral stomach flu will usually start to improve within 24 hours. Nausea and vomiting from food poisoning may last from 12 to 48 hours. The doctor has checked you carefully, but problems can develop later. If you notice any problems or new symptoms, get medical treatment right away. Follow-up care is a key part of your treatment and safety. Be sure to make and go to all appointments, and call your doctor if you are having problems. It's also a good idea to know your test results and keep a list of the medicines you take. How can you care for yourself at home? · To prevent dehydration, drink plenty of fluids, enough so that your urine is light yellow or clear like water. Choose water and other caffeine-free clear liquids until you feel better. If you have kidney, heart, or liver disease and have to limit fluids, talk with your doctor before you increase the amount of fluids you drink. · Rest in bed until you feel better. · When you are able to eat, try clear soups, mild foods, and liquids until all symptoms are gone for 12 to 48 hours. Other good choices include dry toast, crackers, cooked cereal, and gelatin dessert, such as Jell-O. When should you call for help? Call 911 anytime you think you may need emergency care. For example, call if:    · You passed out (lost consciousness).    Call your doctor now or seek immediate medical care if:    · You have symptoms of dehydration, such as:  ? Dry eyes and a dry mouth. ? Passing only a little dark urine. ?  Feeling thirstier than usual.   · You have new or worsening belly pain.     · You have a new or higher fever.     · You vomit blood or what looks like coffee grounds.    Watch closely for changes in your health, and be sure to contact your doctor if:    · You have ongoing nausea and vomiting.     · Your vomiting is getting worse.     · Your vomiting lasts longer than 2 days.     · You are not getting better as expected. Where can you learn more? Go to http://martha-giuseppe.info/. Enter 25 134001 in the search box to learn more about \"Nausea and Vomiting: Care Instructions. \"  Current as of: June 26, 2019  Content Version: 12.2  © 7474-9421 [employer]. Care instructions adapted under license by [employer] (which disclaims liability or warranty for this information). If you have questions about a medical condition or this instruction, always ask your healthcare professional. [place] [address] any warranty or liability for your use of this information. Patient Education        Urinary Tract Infection in Women: Care Instructions  Your Care Instructions    A urinary tract infection, or UTI, is a general term for an infection anywhere between the kidneys and the urethra (where urine comes out). Most UTIs are bladder infections. They often cause pain or burning when you urinate. UTIs are caused by bacteria and can be cured with antibiotics. Be sure to complete your treatment so that the infection goes away. Follow-up care is a key part of your treatment and safety. Be sure to make and go to all appointments, and call your doctor if you are having problems. It's also a good idea to know your test results and keep a list of the medicines you take. How can you care for yourself at home? · Take your antibiotics as directed. Do not stop taking them just because you feel better. You need to take the full course of antibiotics.   · Drink extra water and other fluids for the next day or two. This may help wash out the bacteria that are causing the infection. (If you have kidney, heart, or liver disease and have to limit fluids, talk with your doctor before you increase your fluid intake.)  · Avoid drinks that are carbonated or have caffeine. They can irritate the bladder. · Urinate often. Try to empty your bladder each time. · To relieve pain, take a hot bath or lay a heating pad set on low over your lower belly or genital area. Never go to sleep with a heating pad in place. To prevent UTIs  · Drink plenty of water each day. This helps you urinate often, which clears bacteria from your system. (If you have kidney, heart, or liver disease and have to limit fluids, talk with your doctor before you increase your fluid intake.)  · Urinate when you need to. · Urinate right after you have sex. · Change sanitary pads often. · Avoid douches, bubble baths, feminine hygiene sprays, and other feminine hygiene products that have deodorants. · After going to the bathroom, wipe from front to back. When should you call for help? Call your doctor now or seek immediate medical care if:    · Symptoms such as fever, chills, nausea, or vomiting get worse or appear for the first time.     · You have new pain in your back just below your rib cage. This is called flank pain.     · There is new blood or pus in your urine.     · You have any problems with your antibiotic medicine.    Watch closely for changes in your health, and be sure to contact your doctor if:    · You are not getting better after taking an antibiotic for 2 days.     · Your symptoms go away but then come back. Where can you learn more? Go to http://martha-giuseppe.info/. Enter P792 in the search box to learn more about \"Urinary Tract Infection in Women: Care Instructions. \"  Current as of: December 19, 2018  Content Version: 12.2  © 1157-4870 [employer].  Care instructions adapted under license by [employer] (which disclaims liability or warranty for this information). If you have questions about a medical condition or this instruction, always ask your healthcare professional. [address] any warranty or liability for your use of this information.

## 2019-11-16 NOTE — ED PROVIDER NOTES
HPI     81 YO female dx'd yesterday with UTI by pcp awoke at 5 am feeling very cold. No temp taken. Took 2 tylenol at 7 am today with some relief. 1.5 weeks of dysuria, urinary frequency and urgency. Prescribed macrobid with one dose yesterday. Pt reports cultures sent by pcp. Denies new back pain.  + vomiting this am.  Called the on call doctor and told to come to the ED. patient denies any chest pain, shortness of breath, diarrhea, abdominal pain or other complaints. Social history: . Non-smoker    Here with daughter.     Past Medical History:   Diagnosis Date    Arthritis     Asthma     Chronic pain     Dementia (HCC)     GERD (gastroesophageal reflux disease)     Hypertension     Vertigo        Past Surgical History:   Procedure Laterality Date    HX APPENDECTOMY      HX BACK SURGERY  05/21/2015    stimulator [employer]     HX CATARACT REMOVAL      bilateral    HX CERVICAL FUSION      anterior disc fusion    HX HEENT      uppp    HX LAP CHOLECYSTECTOMY      HX ORTHOPAEDIC      right carpal tunnel release    HX MATT AND BSO           Family History:   Problem Relation Age of Onset    Stroke Mother     Cancer Father        Social History     Socioeconomic History    Marital status:      Spouse name: Not on file    Number of children: Not on file    Years of education: Not on file    Highest education level: Not on file   Occupational History    Not on file   Social Needs    Financial resource strain: Not on file    Food insecurity:     Worry: Not on file     Inability: Not on file    Transportation needs:     Medical: Not on file     Non-medical: Not on file   Tobacco Use    Smoking status: Former Smoker    Smokeless tobacco: Never Used    Tobacco comment: stopped 25 plus years ago   Substance and Sexual Activity    Alcohol use: No    Drug use: No    Sexual activity: Not on file   Lifestyle    Physical activity:     Days per week: Not on file     Minutes per session: Not on file    Stress: Not on file   Relationships    Social connections:     Talks on phone: Not on file     Gets together: Not on file     Attends Shinto service: Not on file     Active member of club or organization: Not on file     Attends meetings of clubs or organizations: Not on file     Relationship status: Not on file    Intimate partner violence:     Fear of current or ex partner: Not on file     Emotionally abused: Not on file     Physically abused: Not on file     Forced sexual activity: Not on file   Other Topics Concern    Not on file   Social History Narrative    Not on file         ALLERGIES: Adhesive tape    Review of Systems   Constitutional: Positive for appetite change, chills and fever. Respiratory: Negative for chest tightness and shortness of breath. Gastrointestinal: Positive for nausea and vomiting. Negative for abdominal pain. All other systems reviewed and are negative. Vitals:    11/16/19 1015   BP: 136/66   Pulse: 82   Resp: 16   Temp: 98.5 °F (36.9 °C)   SpO2: 96%   Weight: 77.3 kg (170 lb 6.7 oz)   Height: 5' 5\" (1.651 m)            Physical Exam     Nursing note and vitals reviewed. Constitutional: appears well-developed and well-nourished. No distress. ELDERLY. HENT:   Head: Normocephalic and atraumatic. Sclera anicteric  Nose: No rhinorrhea  Mouth/Throat: Oropharynx is clear and MILDLY DRY. Pharynx normal  Eyes: Conjunctivae are normal. Pupils are equal, round, and reactive to light. Right eye exhibits no discharge. Left eye exhibits no discharge. No scleral icterus. Neck: Painless normal range of motion. Supple  Cardiovascular: Normal rate, regular rhythm, normal heart sounds and intact distal pulses. Exam reveals no gallop and no friction rub. No murmur heard. Pulmonary/Chest: Effort normal and breath sounds normal. No respiratory distress. no wheezes. no rales. Abdominal: Soft.  Bowel sounds are normal. Exhibits no distension and no mass. No tenderness. No guarding. Musculoskeletal: Normal range of motion. no tenderness. No edema  Lymphadenopathy:   No cervical adenopathy. Neurological:  Alert and oriented to person, place, and time. Moving all extremities. Skin: Skin is warm and dry. No rash noted. No pallor. MDM     80-year-old female currently on treatment for UTI with Macrobid now with shaking chills, vomiting. Patient was symptomatic with dysuria, urinary frequency and urgency. Differential diagnosis includes bacteremia, pyelonephritis, UTI, BEKAH, electrolyte abnormality and others. Will check labs, blood culture, give IV fluids. She does appear mildly dehydrated with dry mucous membranes. Procedures    12:01 PM  Not tachycardic. Tolerating po well. Cr 1.05. Lytes ok. Wbc 11. Lactate normal.  Will give iv rocephin now and change abx to omnicef x 10 days. 1:24 PM  Tolerating po well. No vomiting in ED. Not tachycardic. bp ok. Given rocephin. F/u pcp. 1:25 PM  Patient's results have been reviewed with them. Patient and/or family have verbally conveyed their understanding and agreement of the patient's signs, symptoms, diagnosis, treatment and prognosis and additionally agree to follow up as recommended or return to the Emergency Room should their condition change prior to follow-up. Discharge instructions have also been provided to the patient with some educational information regarding their diagnosis as well a list of reasons why they would want to return to the ER prior to their follow-up appointment should their condition change.         Recent Results (from the past 24 hour(s))   CBC WITH AUTOMATED DIFF    Collection Time: 11/16/19 11:06 AM   Result Value Ref Range    WBC 11.2 (H) 3.6 - 11.0 K/uL    RBC 4.14 3.80 - 5.20 M/uL    HGB 13.1 11.5 - 16.0 g/dL    HCT 40.0 35.0 - 47.0 %    MCV 96.6 80.0 - 99.0 FL    MCH 31.6 26.0 - 34.0 PG    MCHC 32.8 30.0 - 36.5 g/dL    RDW 13.4 11.5 - 14.5 % PLATELET 895 (H) 225 - 400 K/uL    MPV 10.6 8.9 - 12.9 FL    NRBC 0.0 0  WBC    ABSOLUTE NRBC 0.00 0.00 - 0.01 K/uL    NEUTROPHILS 88 (H) 32 - 75 %    LYMPHOCYTES 4 (L) 12 - 49 %    MONOCYTES 5 5 - 13 %    EOSINOPHILS 2 0 - 7 %    BASOPHILS 0 0 - 1 %    IMMATURE GRANULOCYTES 1 (H) 0.0 - 0.5 %    ABS. NEUTROPHILS 9.8 (H) 1.8 - 8.0 K/UL    ABS. LYMPHOCYTES 0.4 (L) 0.8 - 3.5 K/UL    ABS. MONOCYTES 0.6 0.0 - 1.0 K/UL    ABS. EOSINOPHILS 0.2 0.0 - 0.4 K/UL    ABS. BASOPHILS 0.0 0.0 - 0.1 K/UL    ABS. IMM. GRANS. 0.1 (H) 0.00 - 0.04 K/UL    DF AUTOMATED     METABOLIC PANEL, COMPREHENSIVE    Collection Time: 11/16/19 11:06 AM   Result Value Ref Range    Sodium 140 136 - 145 mmol/L    Potassium 4.1 3.5 - 5.1 mmol/L    Chloride 104 97 - 108 mmol/L    CO2 24 21 - 32 mmol/L    Anion gap 12 5 - 15 mmol/L    Glucose 131 (H) 65 - 100 mg/dL    BUN 15 6 - 20 MG/DL    Creatinine 1.05 (H) 0.55 - 1.02 MG/DL    BUN/Creatinine ratio 14 12 - 20      GFR est AA >60 >60 ml/min/1.73m2    GFR est non-AA 50 (L) >60 ml/min/1.73m2    Calcium 9.1 8.5 - 10.1 MG/DL    Bilirubin, total 0.6 0.2 - 1.0 MG/DL    ALT (SGPT) 25 12 - 78 U/L    AST (SGOT) 19 15 - 37 U/L    Alk.  phosphatase 86 45 - 117 U/L    Protein, total 6.6 6.4 - 8.2 g/dL    Albumin 3.5 3.5 - 5.0 g/dL    Globulin 3.1 2.0 - 4.0 g/dL    A-G Ratio 1.1 1.1 - 2.2     POC LACTIC ACID    Collection Time: 11/16/19 11:06 AM   Result Value Ref Range    Lactic Acid (POC) 1.30 0.40 - 2.00 mmol/L   URINALYSIS W/MICROSCOPIC    Collection Time: 11/16/19 11:47 AM   Result Value Ref Range    Color YELLOW/STRAW      Appearance HAZY (A) CLEAR      Specific gravity 1.025 1.003 - 1.030      pH (UA) 5.5 5.0 - 8.0      Protein NEGATIVE  NEG mg/dL    Glucose NEGATIVE  NEG mg/dL    Ketone NEGATIVE  NEG mg/dL    Bilirubin NEGATIVE  NEG      Blood NEGATIVE  NEG      Urobilinogen 0.2 0.2 - 1.0 EU/dL    Nitrites NEGATIVE  NEG      Leukocyte Esterase SMALL (A) NEG      WBC 20-50 0 - 4 /hpf    RBC 0-5 0 - 5 /hpf    Epithelial cells MODERATE (A) FEW /lpf    Bacteria 2+ (A) NEG /hpf    Mucus 2+ (A) NEG /lpf   URINE CULTURE HOLD SAMPLE    Collection Time: 11/16/19 11:47 AM   Result Value Ref Range    Urine culture hold        URINE ON HOLD IN MICROBIOLOGY DEPT FOR 3 DAYS. IF UNPRESERVED URINE IS SUBMITTED, IT CANNOT BE USED FOR ADDITIONAL TESTING AFTER 24 HRS, RECOLLECTION WILL BE REQUIRED. No results found.

## 2019-11-18 LAB
BACTERIA SPEC CULT: ABNORMAL
CC UR VC: ABNORMAL
SERVICE CMNT-IMP: ABNORMAL

## 2019-11-21 LAB
BACTERIA SPEC CULT: NORMAL
SERVICE CMNT-IMP: NORMAL

## 2020-02-20 ENCOUNTER — HOSPITAL ENCOUNTER (OUTPATIENT)
Dept: ULTRASOUND IMAGING | Age: 85
Discharge: HOME | End: 2020-02-20
Attending: FAMILY MEDICINE
Payer: MEDICARE

## 2020-02-20 DIAGNOSIS — M79.89 SWELLING OF LEFT LOWER EXTREMITY: ICD-10-CM

## 2020-02-20 PROCEDURE — 93971 EXTREMITY STUDY: CPT

## 2020-02-21 ENCOUNTER — OFFICE VISIT (OUTPATIENT)
Dept: NEUROLOGY | Age: 85
End: 2020-02-21

## 2020-02-21 VITALS
DIASTOLIC BLOOD PRESSURE: 70 MMHG | HEART RATE: 87 BPM | RESPIRATION RATE: 16 BRPM | BODY MASS INDEX: 27.82 KG/M2 | WEIGHT: 167 LBS | SYSTOLIC BLOOD PRESSURE: 166 MMHG | OXYGEN SATURATION: 97 % | HEIGHT: 65 IN

## 2020-02-21 DIAGNOSIS — R26.0 ATAXIC GAIT: ICD-10-CM

## 2020-02-21 DIAGNOSIS — F02.80 LATE ONSET ALZHEIMER'S DISEASE WITHOUT BEHAVIORAL DISTURBANCE (HCC): ICD-10-CM

## 2020-02-21 DIAGNOSIS — I65.23 STENOSIS OF BOTH INTERNAL CAROTID ARTERIES: Primary | ICD-10-CM

## 2020-02-21 DIAGNOSIS — M54.16 LUMBAR RADICULAR PAIN: ICD-10-CM

## 2020-02-21 DIAGNOSIS — M48.061 SPINAL STENOSIS OF LUMBAR REGION WITHOUT NEUROGENIC CLAUDICATION: ICD-10-CM

## 2020-02-21 DIAGNOSIS — F02.80 ALZHEIMER'S TYPE DEMENTIA WITH LATE ONSET WITHOUT BEHAVIORAL DISTURBANCE (HCC): ICD-10-CM

## 2020-02-21 DIAGNOSIS — M79.7 FIBROMYALGIA: ICD-10-CM

## 2020-02-21 DIAGNOSIS — G25.0 BENIGN ESSENTIAL TREMOR SYNDROME: ICD-10-CM

## 2020-02-21 DIAGNOSIS — G30.1 LATE ONSET ALZHEIMER'S DISEASE WITHOUT BEHAVIORAL DISTURBANCE (HCC): ICD-10-CM

## 2020-02-21 DIAGNOSIS — F02.80 DEMENTIA IN ALZHEIMER'S DISEASE WITH EARLY ONSET WITHOUT BEHAVIORAL DISTURBANCE (HCC): ICD-10-CM

## 2020-02-21 DIAGNOSIS — E03.4 HYPOTHYROIDISM DUE TO ACQUIRED ATROPHY OF THYROID: ICD-10-CM

## 2020-02-21 DIAGNOSIS — G30.1 ALZHEIMER'S TYPE DEMENTIA WITH LATE ONSET WITHOUT BEHAVIORAL DISTURBANCE (HCC): ICD-10-CM

## 2020-02-21 DIAGNOSIS — M96.1 LUMBAR POST-LAMINECTOMY SYNDROME: ICD-10-CM

## 2020-02-21 DIAGNOSIS — I67.89 CEREBRAL MICROVASCULAR DISEASE: ICD-10-CM

## 2020-02-21 DIAGNOSIS — G30.0 DEMENTIA IN ALZHEIMER'S DISEASE WITH EARLY ONSET WITHOUT BEHAVIORAL DISTURBANCE (HCC): ICD-10-CM

## 2020-02-21 RX ORDER — FLUTICASONE FUROATE AND VILANTEROL TRIFENATATE 100; 25 UG/1; UG/1
POWDER RESPIRATORY (INHALATION)
COMMUNITY
Start: 2020-02-11

## 2020-02-21 RX ORDER — PRIMIDONE 50 MG/1
25 TABLET ORAL
Qty: 90 TAB | Refills: 4 | Status: SHIPPED | OUTPATIENT
Start: 2020-02-21 | End: 2021-05-04 | Stop reason: SDUPTHER

## 2020-02-21 RX ORDER — CHLORZOXAZONE 500 MG/1
TABLET ORAL
COMMUNITY
Start: 2020-01-06 | End: 2021-04-12 | Stop reason: ALTCHOICE

## 2020-02-21 NOTE — PATIENT INSTRUCTIONS
A Healthy Lifestyle: Care Instructions Your Care Instructions A healthy lifestyle can help you feel good, stay at a healthy weight, and have plenty of energy for both work and play. A healthy lifestyle is something you can share with your whole family. A healthy lifestyle also can lower your risk for serious health problems, such as high blood pressure, heart disease, and diabetes. You can follow a few steps listed below to improve your health and the health of your family. Follow-up care is a key part of your treatment and safety. Be sure to make and go to all appointments, and call your doctor if you are having problems. It's also a good idea to know your test results and keep a list of the medicines you take. How can you care for yourself at home? · Do not eat too much sugar, fat, or fast foods. You can still have dessert and treats now and then. The goal is moderation. · Start small to improve your eating habits. Pay attention to portion sizes, drink less juice and soda pop, and eat more fruits and vegetables. ? Eat a healthy amount of food. A 3-ounce serving of meat, for example, is about the size of a deck of cards. Fill the rest of your plate with vegetables and whole grains. ? Limit the amount of soda and sports drinks you have every day. Drink more water when you are thirsty. ? Eat at least 5 servings of fruits and vegetables every day. It may seem like a lot, but it is not hard to reach this goal. A serving or helping is 1 piece of fruit, 1 cup of vegetables, or 2 cups of leafy, raw vegetables. Have an apple or some carrot sticks as an afternoon snack instead of a candy bar. Try to have fruits and/or vegetables at every meal. 
· Make exercise part of your daily routine. You may want to start with simple activities, such as walking, bicycling, or slow swimming. Try to be active 30 to 60 minutes every day.  You do not need to do all 30 to 60 minutes all at once. For example, you can exercise 3 times a day for 10 or 20 minutes. Moderate exercise is safe for most people, but it is always a good idea to talk to your doctor before starting an exercise program. 
· Keep moving. Alexandria Villar the lawn, work in the garden, or [employer]. Take the stairs instead of the elevator at work. · If you smoke, quit. People who smoke have an increased risk for heart attack, stroke, cancer, and other lung illnesses. Quitting is hard, but there are ways to boost your chance of quitting tobacco for good. ? Use nicotine gum, patches, or lozenges. ? Ask your doctor about stop-smoking programs and medicines. ? Keep trying. In addition to reducing your risk of diseases in the future, you will notice some benefits soon after you stop using tobacco. If you have shortness of breath or asthma symptoms, they will likely get better within a few weeks after you quit. · Limit how much alcohol you drink. Moderate amounts of alcohol (up to 2 drinks a day for men, 1 drink a day for women) are okay. But drinking too much can lead to liver problems, high blood pressure, and other health problems. Family health If you have a family, there are many things you can do together to improve your health. · Eat meals together as a family as often as possible. · Eat healthy foods. This includes fruits, vegetables, lean meats and dairy, and whole grains. · Include your family in your fitness plan. Most people think of activities such as jogging or tennis as the way to fitness, but there are many ways you and your family can be more active. Anything that makes you breathe hard and gets your heart pumping is exercise. Here are some tips: 
? Walk to do errands or to take your child to school or the bus. 
? Go for a family bike ride after dinner instead of watching TV. Where can you learn more? Go to http://martha-giuseppe.info/. Enter A321 in the search box to learn more about \"A Healthy Lifestyle: Care Instructions. \" Current as of: May 28, 2019 Content Version: 12.2 © 8398-0203 [employer]. Care instructions adapted under license by [employer] (which disclaims liability or warranty for this information). If you have questions about a medical condition or this instruction, always ask your healthcare professional. [address] any warranty or liability for your use of this information. Office Policies 
 
o Phone calls/patient messages: Please allow up to 24 hours for someone in the office to contact you about your call or message. Be mindful your provider may be out of the office or your message may require further review. We encourage you to use [employer] for your messages as this is a faster, more efficient way to communicate with our office 
 
o Medication Refills: 
Prescription medications require up to 48 business hours to process. We encourage you to use [employer] for your refills. For controlled medications: Please allow up to 72 business hours to process. Certain medications may require you to  a written prescription at our office. NO narcotic/controlled medications will be prescribed after 4pm Monday through Friday or on weekends 
 
o Form/Paperwork Completion: We ask that you allow 7-14 business days. You may also download your forms to [employer] to have your doctor print off.

## 2020-02-21 NOTE — LETTER
2/21/20 Patient: Mckinley Madden YOB: 1934 Date of Visit: 2/21/2020 Lex Garner MD 
[address]  [address] [place] [address] VIA Facsimile: [phone] Dear Lex Garner MD, Thank you for referring Ms. Mckinley Madden to [address] [place] [address] for evaluation. My notes for this consultation are attached. Consult Subjective:  
 
Mckinley Madden is a 80 y. o.right-handed  female seen today at the request of Dr. Fauzia Royal for work in evaluation of new problem of increasing tremors when she tries to write or hold things in her hands, right side greater than left, and increasing tremors inside her stomach that she seems preoccupied with. She is also had several bouts of diverticulitis recently, and some GI pain. She took clonazepam for the abdominal tremors, but we got her off the clonazepam and have her now just on BuSpar. She has trouble sleeping at night so we will try her on Mysoline for essential tremor with this tremor looks like, I started 25 mg and advance to 50 if needed. She is advised the side effect as far as drowsiness sedation etc.  She also get a carotid Doppler study because she is not had a carotid Doppler in several years. She had a past history of mini strokes. She is try to exercise more. She is also seen for increasing difficulty with back pain and has had many epidural steroid injection in the past, but they do not seem to be working as well now. .  Patient had a spinal cord stimulator inserted which is not helped all that much so far, may be a little, and so far she had recent radiofrequency ablation of the spinal nerve, and a medial branch denervation procedure or injection to help control her pain which has helped some. She is going to see her pain doctor Dr. Yg Yuan in another week. She has moved out of her daughter's house is now living alone, and seem to be doing okay.   Her memory seems to be okay also and she has no major worsening of the memory. Her swallowing seems better after GI evaluation. Her PCP is monitoring her blood pressure, cholesterol, and she does not smoke, and she try to watch her diet. She takes aspirin daily. She is on blood pressure pills and cholesterol medications. She has chronic back pain, and is under pain management. We cut down her gabapentin to 300 mg twice a day, and she is doing a little better with that. We discussed her condition with the patient and her daughter at length. She continues to take the Aricept, does not think she needs any new medication. She continues her vitamins and vitamin D on a regular basis. She also takes B12. .  She has had no more falls, and continues a regular exercise program.  That all seems to be somewhat better. She denies any headache, any increased neck pain, but does have chronic low back pain and post lumbar laminectomy syndrome and is followed by pain management physician with intermittent injections in her spine. This may be also contributing to her gait instability. She had a normal CT of the head 2 years ago because of her ataxia, and did physical therapy. She still drives and has not had an accident but only drives locally. She feels no problem doing it. She has also seen for increasing back pain and has spinal cord stimulator insertion for post lumbar laminectomy syndrome. She has weakness in her legs and difficulty walking, and she had an MRI scan of the lumbar spine apparently shows a disc and spinal stenosis. She doesn't seem to have much new weakness in the legs, and her bowel and bladder function is stable except for increasing difficulty emptying her bladder. She is seeing a urologist. She had previous back surgery and has a postlaminectomy syndrome, and now a new lumbar disc and spinal stenosis.  Besides the weakness in her legs, she's had no other new weakness, sensory loss visual changes or headaches, or other new neurological problems except for the neurogenic bladder. She is using a cane or walker for ambulation because of her back pain. For her increasing back pain we offered her physical therapy but she would rather try her own exercises at home. She Has had no other new medical problems complications or illnesses except for a bout of diverticulitis. She is on Aricept 10 mg a day and is on clonazepam .5 mg each day. She no longer uses Ambien for sleep and takes vitamins regularly. Her past medical history is pertinent for hypertension, depression, GERD, asthma, arthritis, post laminectomy syndrome, lumbar radiculopathy, a central tremor, dementia, ataxic gait, cataract surgery, cervical fusion, right carpal tunnel syndrome, post cervical laminectomy fusion. Hysterectomy and bilateral salpingo-oophorectomy, gallbladder surgery, right carpal tunnel surgery. Past Medical History:  
Diagnosis Date  Arthritis  Asthma  Chronic pain  Dementia ([address]  GERD (gastroesophageal reflux disease)  Hypertension  Vertigo Past Surgical History:  
Procedure Laterality Date  HX APPENDECTOMY [facility] BACK SURGERY  05/21/2015  
 stimulator Medtronic  HX CATARACT REMOVAL    
 bilateral  
 HX CERVICAL FUSION    
 anterior disc fusion  HX HEENT    
 uppp  HX LAP CHOLECYSTECTOMY  HX ORTHOPAEDIC    
 right carpal tunnel release  HX MATT AND BSO Family History Problem Relation Age of Onset  Stroke Mother  Cancer Father Social History Tobacco Use  Smoking status: Former Smoker  Smokeless tobacco: Never Used  Tobacco comment: stopped 25 plus years ago Substance Use Topics  Alcohol use: No  
   
Current Outpatient Medications Medication Sig Dispense Refill  chlorzoxazone (PARAFON FORTE) 500 mg tablet TK 1 T PO QD PRF MUSCLE SPASM  BREO ELLIPTA 100-25 mcg/dose inhaler INHALE 1 PUFF D    
  primidone (MYSOLINE) 50 mg tablet Take 0.5 Tabs by mouth nightly. 90 Tab 4  
 albuterol sulfate (PROAIR RESPICLICK) 90 mcg/actuation aepb ProAir RespiClick 90 mcg/actuation breath activated  busPIRone (BUSPAR) 10 mg tablet TAKE 1 TABLET BY MOUTH THREE TIMES DAILY AFTER MEALS 100 Tab 0  
 traMADol (ULTRAM) 50 mg tablet Take 50 mg by mouth every six (6) hours as needed for Pain.  gabapentin (NEURONTIN) 300 mg capsule Take 1 Cap by mouth two (2) times a day. Max Daily Amount: 600 mg. 100 Cap 5  
 donepezil (ARICEPT) 10 mg tablet Take 1 Tab by mouth daily. 90 Tab 3  
 montelukast (SINGULAIR) 10 mg tablet TK 1 T PO D  3  
 CYANOCOBALAMIN, VITAMIN B-12, (VITAMIN B-12 PO) Take  by mouth daily.  aspirin delayed-release 81 mg tablet Take  by mouth daily.  colestipol (COLESTID) 1 gram tablet Take 1 g by mouth two (2) times a day.  EZ-MI-QF-Fe-Min-Lycopen-Lutein (CENTRUM) 0.4-162-18 mg tab Take  by mouth.  traZODone (DESYREL) 50 mg tablet Take 50 mg by mouth nightly.  dicyclomine (BENTYL) 10 mg capsule Take 10 mg by mouth four (4) times daily.  losartan (COZAAR) 100 mg tablet Take 100 mg by mouth daily.  sertraline (ZOLOFT) 100 mg tablet Take 100 mg by mouth every morning.  omeprazole (PRILOSEC) 40 mg capsule Take 40 mg by mouth daily. Allergies Allergen Reactions  Adhesive Tape Other (comments)  
  hurts the area it is placed on Review of Systems: A comprehensive review of systems was negative except for: Musculoskeletal: positive for myalgias, arthralgias, stiff joints and back pain Neurological: positive for memory problems and tremor Behvioral/Psych: positive for anxiety and depression Objective: I 
 
 
NEUROLOGICAL EXAM: 
 
Appearance: The patient is well developed, well nourished, provides a fair history and is in no acute distress.   
Mental Status: Oriented to time, place and person and the president, but misses the day of the month. Her speech is 100% intelligible, and she has mild cognitive impairment because of her dementia. She can't remember only one of 3 words at 30 seconds with distraction, and cannot subtract serial sevens, or spell the word world backward, or draw a clock that shows a time 10 minutes to 11. Patient is a little slow mentally for some recent memory loss but actually functioning fairly well in her ADLs. Mood and affect slightly anxious and depressed. Cranial Nerves:   Intact visual fields. Fundi are benign, discs are flat no lesions seen on funduscopy. . FRANK, EOM's full, no nystagmus, no ptosis. Facial sensation is normal. Corneal reflexes are not tested. Facial movement is symmetric. Hearing is abnormal bilaterally. Palate is midline with normal sternocleidomastoid and trapezius muscles are normal. Tongue is midline. Neck without meningismus or bruits Temporal arteries are not tender or enlarged Motor:  4/5 strength in upper and lower proximal and distal muscles. Normal bulk and tone. No fasciculations. Patient has negative straight leg raising test negative in the sitting position but has percussion tenderness over the lumbar spine Patient had a spinal stimulator in the lumbar spine with stable surgical incisions Rapid altering movement is slow in all extremities Reflexes:   Deep tendon reflexes 1+/4 and symmetrical. No Babinski or clonus present Sensory:   Normal to touch, pinprick and DSS, and temperature and vibration mildly decreased in both lower extremities. Gait:  Abnormal gait for age, as she does move slowly due to arthritis in her back, walk with a limp on her right leg, and normally uses a cane for ambulation. Tremor:   Mild intention tremor noted. Cerebellar:  Mldly abnormal Romberg and tandem cerebellar signs present And finger-nose-finger examination shows no dysmetria.   
Neurovascular:  Normal heart sounds and regular rhythm, peripheral pulses decreased in both feet, and no carotid bruits. Assessment:  
 
 
Plan: She with new problem of increasing tremors, look more like benign essential tremor, and are interfering with her ability to function, so we will start her on Mysoline 25 mg at nighttime advance to 50 mg if needed the patient advised of the side effect as well as other sedation or ataxia etc.  Patient is back pain Is increasing, despite numerous epidural injections and nerve ablations, and spinal cord stimulator, and advises not much else to do other than pain management and try some physical therapy but she would rather try exercise program on her own at home. Patient has not had a recent carotid Doppler study so 1 was ordered for the next week or 2. She is had no recurrent TIA symptoms. He still complains of the quivering in her stomach, so we will try the Mysoline see if that helps that because it might be part of her essential tremor Because of her back pain I suggested she stay on the gabapentin 300 mg twice a day since she is to be tolerating that well without drowsiness side effect and does seem be helping the pain. We discussed her condition with the patient and her daughter in detail. We encourage her to remain active and try to do her back exercises in addition and continue seeing her pain management physician Patient is encouraged to continue her vitamins, vitamin D, remain mentally and physically active, and start exercising more 35 minutes spent with patient and the daughter, and advised her that she has a progressive degenerative brain condition that will only get worse with time. CT of the head and metabolic parameters were all normal 12 months ago Patient is to continue her other medication and try to continue a regular exercise program 
Patient is to see her pain management specialist, for continued treatment and spinal cord stimulator followup Patient is to continue current medications and start exercising more We discussed with her and her daughter in detail, if there is any doubt about her driving ability she should stop immediately or get tested by [facility] Patient will be seen again in 6 months time or earlier as needed. She will call if any problem in the interim. Discuss her condition with patient and daughters at length,  
Time of visit was 35 minutes today She is to keep the lights on in the house at night and be careful when she moves or changes position quickly She will call if any problem in the interim. Signed By: Marlyn Bello MD   
 February 21, 2020 This note will not be viewable in [address] If you have questions, please do not hesitate to call me. I look forward to following your patient along with you. Sincerely, Marlyn Bello MD

## 2020-02-22 NOTE — PROGRESS NOTES
Consult    Subjective:     Aramis Quintanilla is a 80 y. o.right-handed  female seen today at the request of Dr. Natalie Palma for work in evaluation of new problem of increasing tremors when she tries to write or hold things in her hands, right side greater than left, and increasing tremors inside her stomach that she seems preoccupied with. She is also had several bouts of diverticulitis recently, and some GI pain. She took clonazepam for the abdominal tremors, but we got her off the clonazepam and have her now just on BuSpar. She has trouble sleeping at night so we will try her on Mysoline for essential tremor with this tremor looks like, I started 25 mg and advance to 50 if needed. She is advised the side effect as far as drowsiness sedation etc.  She also get a carotid Doppler study because she is not had a carotid Doppler in several years. She had a past history of mini strokes. She is try to exercise more. She is also seen for increasing difficulty with back pain and has had many epidural steroid injection in the past, but they do not seem to be working as well now. .  Patient had a spinal cord stimulator inserted which is not helped all that much so far, may be a little, and so far she had recent radiofrequency ablation of the spinal nerve, and a medial branch denervation procedure or injection to help control her pain which has helped some. She is going to see her pain doctor Dr. Amrik Lucio in another week. She has moved out of her daughter's house is now living alone, and seem to be doing okay. Her memory seems to be okay also and she has no major worsening of the memory. Her swallowing seems better after GI evaluation. Her PCP is monitoring her blood pressure, cholesterol, and she does not smoke, and she try to watch her diet. She takes aspirin daily. She is on blood pressure pills and cholesterol medications. She has chronic back pain, and is under pain management.   We cut down her gabapentin to 300 mg twice a day, and she is doing a little better with that. We discussed her condition with the patient and her daughter at length. She continues to take the Aricept, does not think she needs any new medication. She continues her vitamins and vitamin D on a regular basis. She also takes B12. .  She has had no more falls, and continues a regular exercise program.  That all seems to be somewhat better. She denies any headache, any increased neck pain, but does have chronic low back pain and post lumbar laminectomy syndrome and is followed by pain management physician with intermittent injections in her spine. This may be also contributing to her gait instability. She had a normal CT of the head 2 years ago because of her ataxia, and did physical therapy. She still drives and has not had an accident but only drives locally. She feels no problem doing it. She has also seen for increasing back pain and has spinal cord stimulator insertion for post lumbar laminectomy syndrome. She has weakness in her legs and difficulty walking, and she had an MRI scan of the lumbar spine apparently shows a disc and spinal stenosis. She doesn't seem to have much new weakness in the legs, and her bowel and bladder function is stable except for increasing difficulty emptying her bladder. She is seeing a urologist. She had previous back surgery and has a postlaminectomy syndrome, and now a new lumbar disc and spinal stenosis. Besides the weakness in her legs, she's had no other new weakness, sensory loss visual changes or headaches, or other new neurological problems except for the neurogenic bladder. She is using a cane or walker for ambulation because of her back pain. For her increasing back pain we offered her physical therapy but she would rather try her own exercises at home. She Has had no other new medical problems complications or illnesses except for a bout of diverticulitis.  She is on Aricept 10 mg a day and is on clonazepam .5 mg each day. She no longer uses Ambien for sleep and takes vitamins regularly. Her past medical history is pertinent for hypertension, depression, GERD, asthma, arthritis, post laminectomy syndrome, lumbar radiculopathy, a central tremor, dementia, ataxic gait, cataract surgery, cervical fusion, right carpal tunnel syndrome, post cervical laminectomy fusion. Hysterectomy and bilateral salpingo-oophorectomy, gallbladder surgery, right carpal tunnel surgery. Past Medical History:   Diagnosis Date    Arthritis     Asthma     Chronic pain     Dementia (HCC)     GERD (gastroesophageal reflux disease)     Hypertension     Vertigo       Past Surgical History:   Procedure Laterality Date    HX APPENDECTOMY      HX BACK SURGERY  05/21/2015    stimulator Medtronic     HX CATARACT REMOVAL      bilateral    HX CERVICAL FUSION      anterior disc fusion    HX HEENT      uppp    HX LAP CHOLECYSTECTOMY      HX ORTHOPAEDIC      right carpal tunnel release    HX MATT AND BSO       Family History   Problem Relation Age of Onset    Stroke Mother     Cancer Father       Social History     Tobacco Use    Smoking status: Former Smoker    Smokeless tobacco: Never Used    Tobacco comment: stopped 25 plus years ago   Substance Use Topics    Alcohol use: No       Current Outpatient Medications   Medication Sig Dispense Refill    chlorzoxazone (PARAFON FORTE) 500 mg tablet TK 1 T PO QD PRF MUSCLE SPASM      BREO ELLIPTA 100-25 mcg/dose inhaler INHALE 1 PUFF D      primidone (MYSOLINE) 50 mg tablet Take 0.5 Tabs by mouth nightly. 90 Tab 4    albuterol sulfate (PROAIR RESPICLICK) 90 mcg/actuation aepb ProAir RespiClick 90 mcg/actuation breath activated      busPIRone (BUSPAR) 10 mg tablet TAKE 1 TABLET BY MOUTH THREE TIMES DAILY AFTER MEALS 100 Tab 0    traMADol (ULTRAM) 50 mg tablet Take 50 mg by mouth every six (6) hours as needed for Pain.       gabapentin (NEURONTIN) 300 mg capsule Take 1 Cap by mouth two (2) times a day. Max Daily Amount: 600 mg. 100 Cap 5    donepezil (ARICEPT) 10 mg tablet Take 1 Tab by mouth daily. 90 Tab 3    montelukast (SINGULAIR) 10 mg tablet TK 1 T PO D  3    CYANOCOBALAMIN, VITAMIN B-12, (VITAMIN B-12 PO) Take  by mouth daily.  aspirin delayed-release 81 mg tablet Take  by mouth daily.  colestipol (COLESTID) 1 gram tablet Take 1 g by mouth two (2) times a day.  WG-WV-BF-Fe-Min-Lycopen-Lutein (CENTRUM) 0.4-162-18 mg tab Take  by mouth.  traZODone (DESYREL) 50 mg tablet Take 50 mg by mouth nightly.  dicyclomine (BENTYL) 10 mg capsule Take 10 mg by mouth four (4) times daily.  losartan (COZAAR) 100 mg tablet Take 100 mg by mouth daily.  sertraline (ZOLOFT) 100 mg tablet Take 100 mg by mouth every morning.  omeprazole (PRILOSEC) 40 mg capsule Take 40 mg by mouth daily. Allergies   Allergen Reactions    Adhesive Tape Other (comments)     hurts the area it is placed on        Review of Systems:  A comprehensive review of systems was negative except for: Musculoskeletal: positive for myalgias, arthralgias, stiff joints and back pain  Neurological: positive for memory problems and tremor  Behvioral/Psych: positive for anxiety and depression     Objective:     I      NEUROLOGICAL EXAM:    Appearance: The patient is well developed, well nourished, provides a fair history and is in no acute distress. Mental Status: Oriented to time, place and person and the president, but misses the day of the month. Her speech is 100% intelligible, and she has mild cognitive impairment because of her dementia. She can't remember only one of 3 words at 30 seconds with distraction, and cannot subtract serial sevens, or spell the word world backward, or draw a clock that shows a time 10 minutes to 11. Patient is a little slow mentally for some recent memory loss but actually functioning fairly well in her ADLs.  Mood and affect slightly anxious and depressed. Cranial Nerves:   Intact visual fields. Fundi are benign, discs are flat no lesions seen on funduscopy. . FRANK, EOM's full, no nystagmus, no ptosis. Facial sensation is normal. Corneal reflexes are not tested. Facial movement is symmetric. Hearing is abnormal bilaterally. Palate is midline with normal sternocleidomastoid and trapezius muscles are normal. Tongue is midline. Neck without meningismus or bruits  Temporal arteries are not tender or enlarged   Motor:  4/5 strength in upper and lower proximal and distal muscles. Normal bulk and tone. No fasciculations. Patient has negative straight leg raising test negative in the sitting position but has percussion tenderness over the lumbar spine  Patient had a spinal stimulator in the lumbar spine with stable surgical incisions  Rapid altering movement is slow in all extremities   Reflexes:   Deep tendon reflexes 1+/4 and symmetrical. No Babinski or clonus present   Sensory:   Normal to touch, pinprick and DSS, and temperature and vibration mildly decreased in both lower extremities. Gait:  Abnormal gait for age, as she does move slowly due to arthritis in her back, walk with a limp on her right leg, and normally uses a cane for ambulation. Tremor:   Mild intention tremor noted. Cerebellar:  Mldly abnormal Romberg and tandem cerebellar signs present  And finger-nose-finger examination shows no dysmetria. Neurovascular:  Normal heart sounds and regular rhythm, peripheral pulses decreased in both feet, and no carotid bruits.            Assessment:       Plan:     She with new problem of increasing tremors, look more like benign essential tremor, and are interfering with her ability to function, so we will start her on Mysoline 25 mg at nighttime advance to 50 mg if needed the patient advised of the side effect as well as other sedation or ataxia etc.  Patient is back pain  Is increasing, despite numerous epidural injections and nerve ablations, and spinal cord stimulator, and advises not much else to do other than pain management and try some physical therapy but she would rather try exercise program on her own at home. Patient has not had a recent carotid Doppler study so 1 was ordered for the next week or 2. She is had no recurrent TIA symptoms. He still complains of the quivering in her stomach, so we will try the Mysoline see if that helps that because it might be part of her essential tremor  Because of her back pain I suggested she stay on the gabapentin 300 mg twice a day since she is to be tolerating that well without drowsiness side effect and does seem be helping the pain. We discussed her condition with the patient and her daughter in detail. We encourage her to remain active and try to do her back exercises in addition and continue seeing her pain management physician  Patient is encouraged to continue her vitamins, vitamin D, remain mentally and physically active, and start exercising more  35 minutes spent with patient and the daughter, and advised her that she has a progressive degenerative brain condition that will only get worse with time. CT of the head and metabolic parameters were all normal 12 months ago  Patient is to continue her other medication and try to continue a regular exercise program  Patient is to see her pain management specialist, for continued treatment and spinal cord stimulator followup  Patient is to continue current medications and start exercising more   We discussed with her and her daughter in detail, if there is any doubt about her driving ability she should stop immediately or get tested by [facility]  Patient will be seen again in 6 months time or earlier as needed. She will call if any problem in the interim.   Discuss her condition with patient and daughters at length,   Time of visit was 35 minutes today  She is to keep the lights on in the house at night and be careful when she moves or changes position quickly  She will call if any problem in the interim. Signed By: Nazia Champion MD     February 21, 2020       This note will not be viewable in [address]

## 2020-02-28 ENCOUNTER — TELEPHONE (OUTPATIENT)
Dept: NEUROLOGY | Age: 85
End: 2020-02-28

## 2020-02-28 NOTE — TELEPHONE ENCOUNTER
----- Message from Flora Ho sent at 2/28/2020 11:47 AM EST -----  Regarding: Dr. Ave Ley first and last name: Mrs. Interiano/Daughter      Reason for call: requesting a call back in regards to the pt DOPPLER testing       Callback required yes/no and why: yes      Best contact number(s): [phone]      Details to clarify the request:      Flora Ho

## 2020-03-03 NOTE — TELEPHONE ENCOUNTER
I called and they wanted to know if the doppler was that we ordered was the same as the one that she had recently. I advised that these are different: 1 she had was for the leg checking for a clot and this is checking the arteries in the neck for any clots or flow problems.  They will keep appointment that she has

## 2020-03-05 ENCOUNTER — TELEPHONE (OUTPATIENT)
Dept: NEUROLOGY | Age: 85
End: 2020-03-05

## 2020-03-05 NOTE — TELEPHONE ENCOUNTER
----- Message from Bert Foy sent at 3/5/2020  2:16 PM EST -----  Regarding: /telephone  General Message/Vendor Calls    Caller's first and last name: Madyson [address] [place] [address] Dillon Vigil      Reason for call: [facility] would like to speak to the pt's provider in regards to an order that was not signed.       Callback required yes/no and why: yes      Best contact number(s): [phone]      Details to clarify the request:      Bert Foy

## 2020-03-10 DIAGNOSIS — F02.80 LATE ONSET ALZHEIMER'S DISEASE WITHOUT BEHAVIORAL DISTURBANCE (HCC): ICD-10-CM

## 2020-03-10 DIAGNOSIS — M48.061 SPINAL STENOSIS OF LUMBAR REGION WITHOUT NEUROGENIC CLAUDICATION: ICD-10-CM

## 2020-03-10 DIAGNOSIS — G25.0 BENIGN ESSENTIAL TREMOR SYNDROME: ICD-10-CM

## 2020-03-10 DIAGNOSIS — F02.80 DEMENTIA IN ALZHEIMER'S DISEASE WITH EARLY ONSET WITHOUT BEHAVIORAL DISTURBANCE (HCC): ICD-10-CM

## 2020-03-10 DIAGNOSIS — R26.0 ATAXIC GAIT: ICD-10-CM

## 2020-03-10 DIAGNOSIS — M79.7 FIBROMYALGIA: ICD-10-CM

## 2020-03-10 DIAGNOSIS — M96.1 LUMBAR POST-LAMINECTOMY SYNDROME: ICD-10-CM

## 2020-03-10 DIAGNOSIS — G30.0 DEMENTIA IN ALZHEIMER'S DISEASE WITH EARLY ONSET WITHOUT BEHAVIORAL DISTURBANCE (HCC): ICD-10-CM

## 2020-03-10 DIAGNOSIS — I65.23 STENOSIS OF BOTH INTERNAL CAROTID ARTERIES: ICD-10-CM

## 2020-03-10 DIAGNOSIS — I67.89 CEREBRAL MICROVASCULAR DISEASE: ICD-10-CM

## 2020-03-10 DIAGNOSIS — G30.1 LATE ONSET ALZHEIMER'S DISEASE WITHOUT BEHAVIORAL DISTURBANCE (HCC): ICD-10-CM

## 2020-03-10 RX ORDER — GABAPENTIN 300 MG/1
CAPSULE ORAL
Qty: 100 CAP | Refills: 5 | Status: SHIPPED | OUTPATIENT
Start: 2020-03-10 | End: 2020-08-13 | Stop reason: SDUPTHER

## 2020-06-05 DIAGNOSIS — F02.80 ALZHEIMER'S TYPE DEMENTIA WITH LATE ONSET WITHOUT BEHAVIORAL DISTURBANCE (HCC): ICD-10-CM

## 2020-06-05 DIAGNOSIS — F02.80 DEMENTIA IN ALZHEIMER'S DISEASE WITH EARLY ONSET WITHOUT BEHAVIORAL DISTURBANCE (HCC): ICD-10-CM

## 2020-06-05 DIAGNOSIS — R26.0 ATAXIC GAIT: ICD-10-CM

## 2020-06-05 DIAGNOSIS — F02.80 LATE ONSET ALZHEIMER'S DISEASE WITHOUT BEHAVIORAL DISTURBANCE (HCC): ICD-10-CM

## 2020-06-05 DIAGNOSIS — G30.1 ALZHEIMER'S TYPE DEMENTIA WITH LATE ONSET WITHOUT BEHAVIORAL DISTURBANCE (HCC): ICD-10-CM

## 2020-06-05 DIAGNOSIS — M79.7 FIBROMYALGIA: ICD-10-CM

## 2020-06-05 DIAGNOSIS — G30.0 DEMENTIA IN ALZHEIMER'S DISEASE WITH EARLY ONSET WITHOUT BEHAVIORAL DISTURBANCE (HCC): ICD-10-CM

## 2020-06-05 DIAGNOSIS — I67.89 CEREBRAL MICROVASCULAR DISEASE: ICD-10-CM

## 2020-06-05 DIAGNOSIS — I65.23 STENOSIS OF BOTH INTERNAL CAROTID ARTERIES: ICD-10-CM

## 2020-06-05 DIAGNOSIS — E03.4 HYPOTHYROIDISM DUE TO ACQUIRED ATROPHY OF THYROID: ICD-10-CM

## 2020-06-05 DIAGNOSIS — G30.1 LATE ONSET ALZHEIMER'S DISEASE WITHOUT BEHAVIORAL DISTURBANCE (HCC): ICD-10-CM

## 2020-06-05 RX ORDER — BUSPIRONE HYDROCHLORIDE 10 MG/1
TABLET ORAL
Qty: 100 TAB | Refills: 5 | Status: SHIPPED | OUTPATIENT
Start: 2020-06-05 | End: 2020-12-08

## 2020-06-08 ENCOUNTER — TELEPHONE (OUTPATIENT)
Dept: NEUROLOGY | Age: 85
End: 2020-06-08

## 2020-06-08 NOTE — TELEPHONE ENCOUNTER
Her rx ran out weeks ago and needs a renewal. Please call the pharmacy, she is out.  Message from hello

## 2020-06-14 DIAGNOSIS — M79.7 FIBROMYALGIA: ICD-10-CM

## 2020-06-14 DIAGNOSIS — F02.80 LATE ONSET ALZHEIMER'S DISEASE WITHOUT BEHAVIORAL DISTURBANCE (HCC): ICD-10-CM

## 2020-06-14 DIAGNOSIS — R26.0 ATAXIC GAIT: ICD-10-CM

## 2020-06-14 DIAGNOSIS — M48.061 SPINAL STENOSIS OF LUMBAR REGION WITHOUT NEUROGENIC CLAUDICATION: ICD-10-CM

## 2020-06-14 DIAGNOSIS — F02.80 DEMENTIA IN ALZHEIMER'S DISEASE WITH EARLY ONSET WITHOUT BEHAVIORAL DISTURBANCE (HCC): ICD-10-CM

## 2020-06-14 DIAGNOSIS — G30.0 DEMENTIA IN ALZHEIMER'S DISEASE WITH EARLY ONSET WITHOUT BEHAVIORAL DISTURBANCE (HCC): ICD-10-CM

## 2020-06-14 DIAGNOSIS — G25.0 BENIGN ESSENTIAL TREMOR SYNDROME: ICD-10-CM

## 2020-06-14 DIAGNOSIS — I67.89 CEREBRAL MICROVASCULAR DISEASE: ICD-10-CM

## 2020-06-14 DIAGNOSIS — M96.1 LUMBAR POST-LAMINECTOMY SYNDROME: ICD-10-CM

## 2020-06-14 DIAGNOSIS — I65.23 STENOSIS OF BOTH INTERNAL CAROTID ARTERIES: ICD-10-CM

## 2020-06-14 DIAGNOSIS — G30.1 LATE ONSET ALZHEIMER'S DISEASE WITHOUT BEHAVIORAL DISTURBANCE (HCC): ICD-10-CM

## 2020-06-14 RX ORDER — DONEPEZIL HYDROCHLORIDE 10 MG/1
TABLET, FILM COATED ORAL
Qty: 90 TAB | Refills: 3 | Status: SHIPPED | OUTPATIENT
Start: 2020-06-14 | End: 2021-08-24 | Stop reason: SDUPTHER

## 2020-08-12 ENCOUNTER — TELEPHONE (OUTPATIENT)
Dept: NEUROLOGY | Age: 85
End: 2020-08-12

## 2020-08-13 DIAGNOSIS — M79.7 FIBROMYALGIA: ICD-10-CM

## 2020-08-13 DIAGNOSIS — F02.80 DEMENTIA IN ALZHEIMER'S DISEASE WITH EARLY ONSET WITHOUT BEHAVIORAL DISTURBANCE (HCC): ICD-10-CM

## 2020-08-13 DIAGNOSIS — I67.89 CEREBRAL MICROVASCULAR DISEASE: ICD-10-CM

## 2020-08-13 DIAGNOSIS — G30.0 DEMENTIA IN ALZHEIMER'S DISEASE WITH EARLY ONSET WITHOUT BEHAVIORAL DISTURBANCE (HCC): ICD-10-CM

## 2020-08-13 DIAGNOSIS — G30.1 LATE ONSET ALZHEIMER'S DISEASE WITHOUT BEHAVIORAL DISTURBANCE (HCC): ICD-10-CM

## 2020-08-13 DIAGNOSIS — M96.1 LUMBAR POST-LAMINECTOMY SYNDROME: ICD-10-CM

## 2020-08-13 DIAGNOSIS — F02.80 LATE ONSET ALZHEIMER'S DISEASE WITHOUT BEHAVIORAL DISTURBANCE (HCC): ICD-10-CM

## 2020-08-13 DIAGNOSIS — M48.061 SPINAL STENOSIS OF LUMBAR REGION WITHOUT NEUROGENIC CLAUDICATION: ICD-10-CM

## 2020-08-13 DIAGNOSIS — I65.23 STENOSIS OF BOTH INTERNAL CAROTID ARTERIES: ICD-10-CM

## 2020-08-13 DIAGNOSIS — R26.0 ATAXIC GAIT: ICD-10-CM

## 2020-08-13 DIAGNOSIS — G25.0 BENIGN ESSENTIAL TREMOR SYNDROME: ICD-10-CM

## 2020-08-13 RX ORDER — GABAPENTIN 300 MG/1
300 CAPSULE ORAL 3 TIMES DAILY
Qty: 100 CAP | Refills: 5 | Status: SHIPPED | OUTPATIENT
Start: 2020-08-13 | End: 2020-10-02 | Stop reason: SDUPTHER

## 2020-10-02 ENCOUNTER — OFFICE VISIT (OUTPATIENT)
Dept: NEUROLOGY | Age: 85
End: 2020-10-02
Payer: MEDICARE

## 2020-10-02 VITALS
RESPIRATION RATE: 18 BRPM | BODY MASS INDEX: 28.86 KG/M2 | OXYGEN SATURATION: 99 % | HEART RATE: 71 BPM | SYSTOLIC BLOOD PRESSURE: 143 MMHG | HEIGHT: 65 IN | WEIGHT: 173.2 LBS | TEMPERATURE: 98.1 F | DIASTOLIC BLOOD PRESSURE: 68 MMHG

## 2020-10-02 DIAGNOSIS — M79.7 FIBROMYALGIA: ICD-10-CM

## 2020-10-02 DIAGNOSIS — G30.1 LATE ONSET ALZHEIMER'S DISEASE WITHOUT BEHAVIORAL DISTURBANCE (HCC): ICD-10-CM

## 2020-10-02 DIAGNOSIS — F02.80 LATE ONSET ALZHEIMER'S DISEASE WITHOUT BEHAVIORAL DISTURBANCE (HCC): ICD-10-CM

## 2020-10-02 DIAGNOSIS — M96.1 LUMBAR POST-LAMINECTOMY SYNDROME: ICD-10-CM

## 2020-10-02 DIAGNOSIS — I65.23 STENOSIS OF BOTH INTERNAL CAROTID ARTERIES: ICD-10-CM

## 2020-10-02 DIAGNOSIS — I67.89 CEREBRAL MICROVASCULAR DISEASE: ICD-10-CM

## 2020-10-02 DIAGNOSIS — G25.0 BENIGN ESSENTIAL TREMOR SYNDROME: ICD-10-CM

## 2020-10-02 DIAGNOSIS — G30.0 DEMENTIA IN ALZHEIMER'S DISEASE WITH EARLY ONSET WITHOUT BEHAVIORAL DISTURBANCE (HCC): ICD-10-CM

## 2020-10-02 DIAGNOSIS — F02.80 DEMENTIA IN ALZHEIMER'S DISEASE WITH EARLY ONSET WITHOUT BEHAVIORAL DISTURBANCE (HCC): ICD-10-CM

## 2020-10-02 DIAGNOSIS — M48.061 SPINAL STENOSIS OF LUMBAR REGION WITHOUT NEUROGENIC CLAUDICATION: ICD-10-CM

## 2020-10-02 DIAGNOSIS — R26.0 ATAXIC GAIT: ICD-10-CM

## 2020-10-02 PROCEDURE — 99214 OFFICE O/P EST MOD 30 MIN: CPT | Performed by: PSYCHIATRY & NEUROLOGY

## 2020-10-02 PROCEDURE — 1090F PRES/ABSN URINE INCON ASSESS: CPT | Performed by: PSYCHIATRY & NEUROLOGY

## 2020-10-02 PROCEDURE — G8536 NO DOC ELDER MAL SCRN: HCPCS | Performed by: PSYCHIATRY & NEUROLOGY

## 2020-10-02 PROCEDURE — G8419 CALC BMI OUT NRM PARAM NOF/U: HCPCS | Performed by: PSYCHIATRY & NEUROLOGY

## 2020-10-02 PROCEDURE — G9717 DOC PT DX DEP/BP F/U NT REQ: HCPCS | Performed by: PSYCHIATRY & NEUROLOGY

## 2020-10-02 PROCEDURE — G8427 DOCREV CUR MEDS BY ELIG CLIN: HCPCS | Performed by: PSYCHIATRY & NEUROLOGY

## 2020-10-02 PROCEDURE — 3288F FALL RISK ASSESSMENT DOCD: CPT | Performed by: PSYCHIATRY & NEUROLOGY

## 2020-10-02 PROCEDURE — 1100F PTFALLS ASSESS-DOCD GE2>/YR: CPT | Performed by: PSYCHIATRY & NEUROLOGY

## 2020-10-02 RX ORDER — GABAPENTIN 300 MG/1
300 CAPSULE ORAL 4 TIMES DAILY
Qty: 120 CAP | Refills: 5 | Status: SHIPPED | OUTPATIENT
Start: 2020-10-02 | End: 2021-04-14 | Stop reason: SDUPTHER

## 2020-10-02 RX ORDER — GLUCOSAMINE SULFATE 1500 MG
POWDER IN PACKET (EA) ORAL DAILY
COMMUNITY

## 2020-10-02 NOTE — LETTER
10/2/20 Patient: Mercedes Keating YOB: 1934 Date of Visit: 10/2/2020 Vanessa Littlejohn MD 
[address] [place] [address] VIA Facsimile: [phone] Dear Vanessa Littlejohn MD, Thank you for referring Ms. Mercedes Keating to [address] [place] [address] for evaluation. My notes for this consultation are attached. Consult Subjective:  
 
Mercedes Keating is a 80 y. o.right-handed  female seen today at the request of Dr. Jean Pierre Alexander for work in evaluation of new problem of recent fall when she tripped going out the garage door, probably because she tripped over the door threshold and fell down and could not get up until the neighbor lady came by and got a neighbor man to help her up. Family concerned because she could not get up by herself, and what to do to prevent this, so I suggested to them that she needs to take her walker when she goes outside, and she needs to wear her bracelet that has an alarm button to press when she is in trouble. She says she will do this from now on. She is also having more difficulty with pain in her right leg, and a burning numbness at times, and she is going to get another epidural steroid injection in the near future with her pain management physician Dr. Bhaskar Alanizia is also having increasing tremors when she tries to write or hold things in her hands, right side greater than left, and increasing tremors inside her stomach that she seems preoccupied with. She is better on 50 mg of Mysoline, but the family is concerned because they seem to be getting a little worse again. We told the family we will increase her gabapentin from 300 mg 3 times a day to 400 mg 3 times a day which should hopefully help the tremors and her pain. She is also had several bouts of diverticulitis recently, and some GI pain.   She took clonazepam for the abdominal tremors, but we got her off the clonazepam and have her now just on BuSpar. She had a past history of mini strokes. She just had a carotid Doppler study done March 2020 that showed mild disease only and no progression of disease. She is try to exercise more. She is also seen for increasing difficulty with back pain and has had many epidural steroid injection in the past, but they do not seem to be working as well now. .  Patient had a spinal cord stimulator inserted which is not helped all that much so far, may be a little, and so far she had recent radiofrequency ablation of the spinal nerve, and a medial branch denervation procedure or injection to help control her pain which has helped some. She is going to see her pain doctor Dr. Argelia Castaneda in another week. She has moved out of her daughter's house is now living alone, and seem to be doing okay. Her memory seems to be okay also and she has no major worsening of the memory. Her swallowing seems better after GI evaluation. Her PCP is monitoring her blood pressure, cholesterol, and she does not smoke, and she try to watch her diet. She takes aspirin daily. She is on blood pressure pills and cholesterol medications. She has chronic back pain, and is under pain management. We cut down her gabapentin to 300 mg twice a day, and she is doing a little better with that. We discussed her condition with the patient and her daughter at length. She continues to take the Aricept, does not think she needs any new medication. She continues her vitamins and vitamin D on a regular basis. She also takes B12. .  She has had no more falls, and continues a regular exercise program.  That all seems to be somewhat better. She denies any headache, any increased neck pain, but does have chronic low back pain and post lumbar laminectomy syndrome and is followed by pain management physician with intermittent injections in her spine.   This may be also contributing to her gait instability. She had a normal CT of the head 2 years ago because of her ataxia, and did physical therapy. She still drives and has not had an accident but only drives locally. She feels no problem doing it. She has also seen for increasing back pain and has spinal cord stimulator insertion for post lumbar laminectomy syndrome. She has weakness in her legs and difficulty walking, and she had an MRI scan of the lumbar spine apparently shows a disc and spinal stenosis. She doesn't seem to have much new weakness in the legs, and her bowel and bladder function is stable except for increasing difficulty emptying her bladder. She is seeing a urologist. She had previous back surgery and has a postlaminectomy syndrome, and now a new lumbar disc and spinal stenosis. Besides the weakness in her legs, she's had no other new weakness, sensory loss visual changes or headaches, or other new neurological problems except for the neurogenic bladder. She is using a cane or walker for ambulation because of her back pain. For her increasing back pain we offered her physical therapy but she would rather try her own exercises at home. She Has had no other new medical problems complications or illnesses except for a bout of diverticulitis. She is on Aricept 10 mg a day and is on clonazepam .5 mg each day. She no longer uses Ambien for sleep and takes vitamins regularly. Her past medical history is pertinent for hypertension, depression, GERD, asthma, arthritis, post laminectomy syndrome, lumbar radiculopathy, a central tremor, dementia, ataxic gait, cataract surgery, cervical fusion, right carpal tunnel syndrome, post cervical laminectomy fusion. Hysterectomy and bilateral salpingo-oophorectomy, gallbladder surgery, right carpal tunnel surgery. Past Medical History:  
Diagnosis Date  Arthritis  Asthma  Chronic pain  Dementia ([facility] [address]  GERD (gastroesophageal reflux disease)  Hypertension  Vertigo Past Surgical History:  
Procedure Laterality Date  HX APPENDECTOMY Carin  BACK SURGERY  05/21/2015  
 stimulator [employer]  HX CATARACT REMOVAL    
 bilateral  
 HX CERVICAL FUSION    
 anterior disc fusion  HX HEENT    
 uppp  HX LAP CHOLECYSTECTOMY  HX ORTHOPAEDIC    
 right carpal tunnel release  HX MATT AND BSO Family History Problem Relation Age of Onset  Stroke Mother  Cancer Father Social History Tobacco Use  Smoking status: Former Smoker  Smokeless tobacco: Never Used  Tobacco comment: stopped 25 plus years ago Substance Use Topics  Alcohol use: No  
   
Current Outpatient Medications Medication Sig Dispense Refill  cholecalciferol (Vitamin D3) 25 mcg (1,000 unit) cap Take  by mouth daily.  gabapentin (NEURONTIN) 300 mg capsule Take 1 Cap by mouth four (4) times daily. Max Daily Amount: 1,200 mg. 120 Cap 5  
 donepeziL (ARICEPT) 10 mg tablet TAKE 1 TABLET BY MOUTH DAILY 90 Tab 3  
 busPIRone (BUSPAR) 10 mg tablet TAKE 1 TABLET BY MOUTH THREE TIMES DAILY AFTER MEALS 100 Tab 5  chlorzoxazone (PARAFON FORTE) 500 mg tablet TK 1 T PO QD PRF MUSCLE SPASM  BREO ELLIPTA 100-25 mcg/dose inhaler INHALE 1 PUFF D    
 primidone (MYSOLINE) 50 mg tablet Take 0.5 Tabs by mouth nightly. 90 Tab 4  
 albuterol sulfate (PROAIR RESPICLICK) 90 mcg/actuation aepb ProAir RespiClick 90 mcg/actuation breath activated  traMADol (ULTRAM) 50 mg tablet Take 50 mg by mouth every six (6) hours as needed for Pain.  montelukast (SINGULAIR) 10 mg tablet TK 1 T PO D  3  
 CYANOCOBALAMIN, VITAMIN B-12, (VITAMIN B-12 PO) Take  by mouth daily.  aspirin delayed-release 81 mg tablet Take  by mouth daily.  colestipol (COLESTID) 1 gram tablet Take 1 g by mouth two (2) times a day.  traZODone (DESYREL) 50 mg tablet Take 50 mg by mouth nightly.  dicyclomine (BENTYL) 10 mg capsule Take 10 mg by mouth four (4) times daily.  losartan (COZAAR) 100 mg tablet Take 100 mg by mouth daily.  sertraline (ZOLOFT) 100 mg tablet Take 100 mg by mouth every morning.  omeprazole (PRILOSEC) 40 mg capsule Take 40 mg by mouth daily.  NS-FJ-LK-Fe-Min-Lycopen-Lutein (CENTRUM) 0.4-162-18 mg tab Take  by mouth. Allergies Allergen Reactions  Adhesive Tape Other (comments)  
  hurts the area it is placed on Review of Systems: A comprehensive review of systems was negative except for: Musculoskeletal: positive for myalgias, arthralgias, stiff joints and back pain Neurological: positive for memory problems and tremor Behvioral/Psych: positive for anxiety and depression Objective: I 
 
 
NEUROLOGICAL EXAM: 
 
Appearance: The patient is well developed, well nourished, provides a fair history and is in no acute distress. Mental Status: Oriented to time, place and person and the president, but misses the day of the month. Her speech is 100% intelligible, and she has mild cognitive impairment because of her dementia. She can't remember only one of 3 words at 30 seconds with distraction, and cannot subtract serial sevens, or spell the word world backward, or draw a clock that shows a time 10 minutes to 11. Patient is a little slow mentally for some recent memory loss but actually functioning fairly well in her ADLs. Mood and affect slightly anxious and depressed. Cranial Nerves:   Intact visual fields. Fundi are benign, discs are flat no lesions seen on funduscopy. . FRANK, EOM's full, no nystagmus, no ptosis. Facial sensation is normal. Corneal reflexes are not tested. Facial movement is symmetric. Hearing is abnormal bilaterally. Palate is midline with normal sternocleidomastoid and trapezius muscles are normal. Tongue is midline. Neck without meningismus or bruits Temporal arteries are not tender or enlarged Motor:  4/5 strength in upper and lower proximal and distal muscles. Normal bulk and tone. No fasciculations. Patient has negative straight leg raising test negative in the sitting position but has percussion tenderness over the lumbar spine Patient had a spinal stimulator in the lumbar spine with stable surgical incisions Rapid altering movement is slow in all extremities Reflexes:   Deep tendon reflexes 1+/4 and symmetrical. No Babinski or clonus present Sensory:   Normal to touch, pinprick and DSS, and temperature and vibration mildly decreased in both lower extremities. Gait:  Abnormal gait for age, as she does move slowly due to arthritis in her back, walk with a limp on her right leg, and normally uses a cane for ambulation. Tremor:   Mild intention tremor noted. Cerebellar:  Mldly abnormal Romberg and tandem cerebellar signs present And finger-nose-finger examination shows no dysmetria. Neurovascular:  Normal heart sounds and regular rhythm, peripheral pulses decreased in both feet, and no carotid bruits. Assessment:  
 
 
Plan:  
 
New problem of recent fall, we advised the patient that she needs to always use her walker when leaving the house, and always bring her emergency call button with her. Patient encouraged that she must keep exercising to try to maintain her strength and physical ability but she does not want any more physical therapy now. Because of increasing right leg radicular pain, we will increase her gabapentin to 4 times a day she is having no side effect on the current dose of medication, hopefully this will help relieve the pain and also help her slightly progressive tremors of essential type that she has in her hands. Granddaughter wanted her to increase her Mysoline, but I told her we should do one thing at a time increasing the gabapentin will be more beneficial at this time to treat both her radiculopathy in the right leg and tremor. She with new problem of increasing tremors, look more like benign essential tremor, and are interfering with her ability to function, so we will continue her on Mysoline 50 mg at nighttime advance her gabapentin as above. Her back pain is increasing, despite numerous epidural injections and nerve ablations, and spinal cord stimulator, and advises not much else to do other than pain management and try some physical therapy but she would rather try exercise program on her own at home. Patient has had a carotid Doppler study that was okay March 2020 showed mild disease only. She is had no recurrent TIA symptoms. He still complains of the quivering in her stomach, so we will continue the Mysoline see if that helps that because it might be part of her essential tremor and her BuSpar and her gabapentin Because of her back pain I suggested she stay on the gabapentin 300 mg 4 times a day since she is to be tolerating that well without drowsiness side effect and does seem be helping the pain. We discussed her condition with the patient and her grand daughter in detail. We encourage her to remain active and try to do her back exercises in addition and continue seeing her pain management physician Patient is encouraged to continue her vitamins, vitamin D, remain mentally and physically active, and start exercising more 35 minutes spent with patient and the daughter, and advised her that she has a progressive degenerative brain condition that will only get worse with time. CT of the head and metabolic parameters were all normal 12 months ago Patient is to continue her other medication and try to continue a regular exercise program 
Patient is to see her pain management specialist, for continued treatment and spinal cord stimulator followup Patient is to continue current medications and start exercising more We discussed with her and her grand daughter in detail, if there is any doubt about her driving ability she should stop immediately or get tested by [facility] Patient will be seen again in 6 months time or earlier as needed. She will call if any problem in the interim. Discuss her condition with patient and daughters at length,  
Time of visit was 35 minutes today She is to keep the lights on in the house at night and be careful when she moves or changes position quickly She will call if any problem in the interim. Signed By: Pelon Parish MD   
 October 2, 2020 This note will not be viewable in [address] If you have questions, please do not hesitate to call me. I look forward to following your patient along with you. Sincerely, Pelon Parish MD

## 2020-10-03 NOTE — PROGRESS NOTES
Consult    Subjective:     Nannette Sotomayor is a 80 y. o.right-handed  female seen today at the request of Dr. Sabina Tellez for work in evaluation of new problem of recent fall when she tripped going out the garage door, probably because she tripped over the door threshold and fell down and could not get up until the neighbor lady came by and got a neighbor man to help her up. Family concerned because she could not get up by herself, and what to do to prevent this, so I suggested to them that she needs to take her walker when she goes outside, and she needs to wear her bracelet that has an alarm button to press when she is in trouble. She says she will do this from now on. She is also having more difficulty with pain in her right leg, and a burning numbness at times, and she is going to get another epidural steroid injection in the near future with her pain management physician Dr. Aguirre Blake is also having increasing tremors when she tries to write or hold things in her hands, right side greater than left, and increasing tremors inside her stomach that she seems preoccupied with. She is better on 50 mg of Mysoline, but the family is concerned because they seem to be getting a little worse again. We told the family we will increase her gabapentin from 300 mg 3 times a day to 400 mg 3 times a day which should hopefully help the tremors and her pain. She is also had several bouts of diverticulitis recently, and some GI pain. She took clonazepam for the abdominal tremors, but we got her off the clonazepam and have her now just on BuSpar. She had a past history of mini strokes. She just had a carotid Doppler study done March 2020 that showed mild disease only and no progression of disease. She is try to exercise more. She is also seen for increasing difficulty with back pain and has had many epidural steroid injection in the past, but they do not seem to be working as well now. .  Patient had a spinal cord stimulator inserted which is not helped all that much so far, may be a little, and so far she had recent radiofrequency ablation of the spinal nerve, and a medial branch denervation procedure or injection to help control her pain which has helped some. She is going to see her pain doctor Dr. Argelia Castaneda in another week. She has moved out of her daughter's house is now living alone, and seem to be doing okay. Her memory seems to be okay also and she has no major worsening of the memory. Her swallowing seems better after GI evaluation. Her PCP is monitoring her blood pressure, cholesterol, and she does not smoke, and she try to watch her diet. She takes aspirin daily. She is on blood pressure pills and cholesterol medications. She has chronic back pain, and is under pain management. We cut down her gabapentin to 300 mg twice a day, and she is doing a little better with that. We discussed her condition with the patient and her daughter at length. She continues to take the Aricept, does not think she needs any new medication. She continues her vitamins and vitamin D on a regular basis. She also takes B12. .  She has had no more falls, and continues a regular exercise program.  That all seems to be somewhat better. She denies any headache, any increased neck pain, but does have chronic low back pain and post lumbar laminectomy syndrome and is followed by pain management physician with intermittent injections in her spine. This may be also contributing to her gait instability. She had a normal CT of the head 2 years ago because of her ataxia, and did physical therapy. She still drives and has not had an accident but only drives locally. She feels no problem doing it. She has also seen for increasing back pain and has spinal cord stimulator insertion for post lumbar laminectomy syndrome. She has weakness in her legs and difficulty walking, and she had an MRI scan of the lumbar spine apparently shows a disc and spinal stenosis. She doesn't seem to have much new weakness in the legs, and her bowel and bladder function is stable except for increasing difficulty emptying her bladder. She is seeing a urologist. She had previous back surgery and has a postlaminectomy syndrome, and now a new lumbar disc and spinal stenosis. Besides the weakness in her legs, she's had no other new weakness, sensory loss visual changes or headaches, or other new neurological problems except for the neurogenic bladder. She is using a cane or walker for ambulation because of her back pain. For her increasing back pain we offered her physical therapy but she would rather try her own exercises at home. She Has had no other new medical problems complications or illnesses except for a bout of diverticulitis. She is on Aricept 10 mg a day and is on clonazepam .5 mg each day. She no longer uses Ambien for sleep and takes vitamins regularly. Her past medical history is pertinent for hypertension, depression, GERD, asthma, arthritis, post laminectomy syndrome, lumbar radiculopathy, a central tremor, dementia, ataxic gait, cataract surgery, cervical fusion, right carpal tunnel syndrome, post cervical laminectomy fusion. Hysterectomy and bilateral salpingo-oophorectomy, gallbladder surgery, right carpal tunnel surgery.     Past Medical History:   Diagnosis Date    Arthritis     Asthma     Chronic pain     Dementia (HCC)     GERD (gastroesophageal reflux disease)     Hypertension     Vertigo       Past Surgical History:   Procedure Laterality Date    HX APPENDECTOMY      HX BACK SURGERY  05/21/2015    stimulator [employer]     HX CATARACT REMOVAL      bilateral    HX CERVICAL FUSION      anterior disc fusion    HX HEENT      uppp    HX LAP CHOLECYSTECTOMY      HX ORTHOPAEDIC      right carpal tunnel release    HX MATT AND BSO       Family History   Problem Relation Age of Onset    Stroke Mother     Cancer Father       Social History Tobacco Use    Smoking status: Former Smoker    Smokeless tobacco: Never Used    Tobacco comment: stopped 25 plus years ago   Substance Use Topics    Alcohol use: No       Current Outpatient Medications   Medication Sig Dispense Refill    cholecalciferol (Vitamin D3) 25 mcg (1,000 unit) cap Take  by mouth daily.  gabapentin (NEURONTIN) 300 mg capsule Take 1 Cap by mouth four (4) times daily. Max Daily Amount: 1,200 mg. 120 Cap 5    donepeziL (ARICEPT) 10 mg tablet TAKE 1 TABLET BY MOUTH DAILY 90 Tab 3    busPIRone (BUSPAR) 10 mg tablet TAKE 1 TABLET BY MOUTH THREE TIMES DAILY AFTER MEALS 100 Tab 5    chlorzoxazone (PARAFON FORTE) 500 mg tablet TK 1 T PO QD PRF MUSCLE SPASM      BREO ELLIPTA 100-25 mcg/dose inhaler INHALE 1 PUFF D      primidone (MYSOLINE) 50 mg tablet Take 0.5 Tabs by mouth nightly. 90 Tab 4    albuterol sulfate (PROAIR RESPICLICK) 90 mcg/actuation aepb ProAir RespiClick 90 mcg/actuation breath activated      traMADol (ULTRAM) 50 mg tablet Take 50 mg by mouth every six (6) hours as needed for Pain.  montelukast (SINGULAIR) 10 mg tablet TK 1 T PO D  3    CYANOCOBALAMIN, VITAMIN B-12, (VITAMIN B-12 PO) Take  by mouth daily.  aspirin delayed-release 81 mg tablet Take  by mouth daily.  colestipol (COLESTID) 1 gram tablet Take 1 g by mouth two (2) times a day.  traZODone (DESYREL) 50 mg tablet Take 50 mg by mouth nightly.  dicyclomine (BENTYL) 10 mg capsule Take 10 mg by mouth four (4) times daily.  losartan (COZAAR) 100 mg tablet Take 100 mg by mouth daily.  sertraline (ZOLOFT) 100 mg tablet Take 100 mg by mouth every morning.  omeprazole (PRILOSEC) 40 mg capsule Take 40 mg by mouth daily.  FB-PS-NA-Fe-Min-Lycopen-Lutein (CENTRUM) 0.4-162-18 mg tab Take  by mouth.           Allergies   Allergen Reactions    Adhesive Tape Other (comments)     hurts the area it is placed on        Review of Systems:  A comprehensive review of systems was negative except for: Musculoskeletal: positive for myalgias, arthralgias, stiff joints and back pain  Neurological: positive for memory problems and tremor  Behvioral/Psych: positive for anxiety and depression     Objective:     I      NEUROLOGICAL EXAM:    Appearance: The patient is well developed, well nourished, provides a fair history and is in no acute distress. Mental Status: Oriented to time, place and person and the president, but misses the day of the month. Her speech is 100% intelligible, and she has mild cognitive impairment because of her dementia. She can't remember only one of 3 words at 30 seconds with distraction, and cannot subtract serial sevens, or spell the word world backward, or draw a clock that shows a time 10 minutes to 11. Patient is a little slow mentally for some recent memory loss but actually functioning fairly well in her ADLs. Mood and affect slightly anxious and depressed. Cranial Nerves:   Intact visual fields. Fundi are benign, discs are flat no lesions seen on funduscopy. . FRANK, EOM's full, no nystagmus, no ptosis. Facial sensation is normal. Corneal reflexes are not tested. Facial movement is symmetric. Hearing is abnormal bilaterally. Palate is midline with normal sternocleidomastoid and trapezius muscles are normal. Tongue is midline. Neck without meningismus or bruits  Temporal arteries are not tender or enlarged   Motor:  4/5 strength in upper and lower proximal and distal muscles. Normal bulk and tone. No fasciculations.   Patient has negative straight leg raising test negative in the sitting position but has percussion tenderness over the lumbar spine  Patient had a spinal stimulator in the lumbar spine with stable surgical incisions  Rapid altering movement is slow in all extremities   Reflexes:   Deep tendon reflexes 1+/4 and symmetrical. No Babinski or clonus present   Sensory:   Normal to touch, pinprick and DSS, and temperature and vibration mildly decreased in both lower extremities. Gait:  Abnormal gait for age, as she does move slowly due to arthritis in her back, walk with a limp on her right leg, and normally uses a cane for ambulation. Tremor:   Mild intention tremor noted. Cerebellar:  Mldly abnormal Romberg and tandem cerebellar signs present  And finger-nose-finger examination shows no dysmetria. Neurovascular:  Normal heart sounds and regular rhythm, peripheral pulses decreased in both feet, and no carotid bruits. Assessment:       Plan:     New problem of recent fall, we advised the patient that she needs to always use her walker when leaving the house, and always bring her emergency call button with her. Patient encouraged that she must keep exercising to try to maintain her strength and physical ability but she does not want any more physical therapy now. Because of increasing right leg radicular pain, we will increase her gabapentin to 4 times a day she is having no side effect on the current dose of medication, hopefully this will help relieve the pain and also help her slightly progressive tremors of essential type that she has in her hands. Granddaughter wanted her to increase her Mysoline, but I told her we should do one thing at a time increasing the gabapentin will be more beneficial at this time to treat both her radiculopathy in the right leg and tremor. She with new problem of increasing tremors, look more like benign essential tremor, and are interfering with her ability to function, so we will continue her on Mysoline 50 mg at nighttime advance her gabapentin as above. Her back pain is increasing, despite numerous epidural injections and nerve ablations, and spinal cord stimulator, and advises not much else to do other than pain management and try some physical therapy but she would rather try exercise program on her own at home. Patient has had a carotid Doppler study that was okay March 2020 showed mild disease only.   She is had no recurrent TIA symptoms. He still complains of the quivering in her stomach, so we will continue the Mysoline see if that helps that because it might be part of her essential tremor and her BuSpar and her gabapentin  Because of her back pain I suggested she stay on the gabapentin 300 mg 4 times a day since she is to be tolerating that well without drowsiness side effect and does seem be helping the pain. We discussed her condition with the patient and her grand daughter in detail. We encourage her to remain active and try to do her back exercises in addition and continue seeing her pain management physician  Patient is encouraged to continue her vitamins, vitamin D, remain mentally and physically active, and start exercising more  35 minutes spent with patient and the daughter, and advised her that she has a progressive degenerative brain condition that will only get worse with time. CT of the head and metabolic parameters were all normal 12 months ago  Patient is to continue her other medication and try to continue a regular exercise program  Patient is to see her pain management specialist, for continued treatment and spinal cord stimulator followup  Patient is to continue current medications and start exercising more   We discussed with her and her grand daughter in detail, if there is any doubt about her driving ability she should stop immediately or get tested by [facility]  Patient will be seen again in 6 months time or earlier as needed. She will call if any problem in the interim. Discuss her condition with patient and daughters at length,   Time of visit was 35 minutes today  She is to keep the lights on in the house at night and be careful when she moves or changes position quickly  She will call if any problem in the interim. Signed By: Branden White MD     October 2, 2020       This note will not be viewable in [address]

## 2020-12-07 DIAGNOSIS — F02.80 ALZHEIMER'S TYPE DEMENTIA WITH LATE ONSET WITHOUT BEHAVIORAL DISTURBANCE (HCC): ICD-10-CM

## 2020-12-07 DIAGNOSIS — I65.23 STENOSIS OF BOTH INTERNAL CAROTID ARTERIES: ICD-10-CM

## 2020-12-07 DIAGNOSIS — M79.7 FIBROMYALGIA: ICD-10-CM

## 2020-12-07 DIAGNOSIS — G30.0 DEMENTIA IN ALZHEIMER'S DISEASE WITH EARLY ONSET WITHOUT BEHAVIORAL DISTURBANCE (HCC): ICD-10-CM

## 2020-12-07 DIAGNOSIS — R26.0 ATAXIC GAIT: ICD-10-CM

## 2020-12-07 DIAGNOSIS — F02.80 DEMENTIA IN ALZHEIMER'S DISEASE WITH EARLY ONSET WITHOUT BEHAVIORAL DISTURBANCE (HCC): ICD-10-CM

## 2020-12-07 DIAGNOSIS — F02.80 LATE ONSET ALZHEIMER'S DISEASE WITHOUT BEHAVIORAL DISTURBANCE (HCC): ICD-10-CM

## 2020-12-07 DIAGNOSIS — G30.1 ALZHEIMER'S TYPE DEMENTIA WITH LATE ONSET WITHOUT BEHAVIORAL DISTURBANCE (HCC): ICD-10-CM

## 2020-12-07 DIAGNOSIS — I67.89 CEREBRAL MICROVASCULAR DISEASE: ICD-10-CM

## 2020-12-07 DIAGNOSIS — G30.1 LATE ONSET ALZHEIMER'S DISEASE WITHOUT BEHAVIORAL DISTURBANCE (HCC): ICD-10-CM

## 2020-12-07 DIAGNOSIS — E03.4 HYPOTHYROIDISM DUE TO ACQUIRED ATROPHY OF THYROID: ICD-10-CM

## 2020-12-08 RX ORDER — BUSPIRONE HYDROCHLORIDE 10 MG/1
TABLET ORAL
Qty: 100 TAB | Refills: 5 | Status: SHIPPED | OUTPATIENT
Start: 2020-12-08 | End: 2021-07-06

## 2021-04-07 ENCOUNTER — HOSPITAL ENCOUNTER (OUTPATIENT)
Dept: MAMMOGRAPHY | Age: 86
Discharge: HOME | End: 2021-04-07
Attending: FAMILY MEDICINE
Payer: MEDICARE

## 2021-04-07 ENCOUNTER — TRANSCRIBE ORDER (OUTPATIENT)
Dept: GENERAL RADIOLOGY | Age: 86
End: 2021-04-07

## 2021-04-07 DIAGNOSIS — M85.89 OSTEOPENIA OF MULTIPLE SITES: ICD-10-CM

## 2021-04-07 DIAGNOSIS — M85.89 OSTEOPENIA OF MULTIPLE SITES: Primary | ICD-10-CM

## 2021-04-07 PROCEDURE — 77080 DXA BONE DENSITY AXIAL: CPT

## 2021-04-08 ENCOUNTER — TELEPHONE (OUTPATIENT)
Dept: NEUROLOGY | Age: 86
End: 2021-04-08

## 2021-04-12 ENCOUNTER — OFFICE VISIT (OUTPATIENT)
Dept: NEUROLOGY | Age: 86
End: 2021-04-12
Payer: MEDICARE

## 2021-04-12 VITALS
HEART RATE: 75 BPM | RESPIRATION RATE: 16 BRPM | HEIGHT: 65 IN | TEMPERATURE: 97.6 F | BODY MASS INDEX: 28.72 KG/M2 | WEIGHT: 172.4 LBS | OXYGEN SATURATION: 96 % | DIASTOLIC BLOOD PRESSURE: 72 MMHG | SYSTOLIC BLOOD PRESSURE: 130 MMHG

## 2021-04-12 DIAGNOSIS — M54.16 LUMBAR RADICULAR PAIN: Primary | ICD-10-CM

## 2021-04-12 DIAGNOSIS — M79.7 FIBROMYALGIA: ICD-10-CM

## 2021-04-12 DIAGNOSIS — M48.061 SPINAL STENOSIS OF LUMBAR REGION WITHOUT NEUROGENIC CLAUDICATION: ICD-10-CM

## 2021-04-12 DIAGNOSIS — M96.1 LUMBAR POST-LAMINECTOMY SYNDROME: ICD-10-CM

## 2021-04-12 PROCEDURE — G8536 NO DOC ELDER MAL SCRN: HCPCS | Performed by: PSYCHIATRY & NEUROLOGY

## 2021-04-12 PROCEDURE — 99214 OFFICE O/P EST MOD 30 MIN: CPT | Performed by: PSYCHIATRY & NEUROLOGY

## 2021-04-12 PROCEDURE — G8427 DOCREV CUR MEDS BY ELIG CLIN: HCPCS | Performed by: PSYCHIATRY & NEUROLOGY

## 2021-04-12 PROCEDURE — 1100F PTFALLS ASSESS-DOCD GE2>/YR: CPT | Performed by: PSYCHIATRY & NEUROLOGY

## 2021-04-12 PROCEDURE — G9717 DOC PT DX DEP/BP F/U NT REQ: HCPCS | Performed by: PSYCHIATRY & NEUROLOGY

## 2021-04-12 PROCEDURE — G8419 CALC BMI OUT NRM PARAM NOF/U: HCPCS | Performed by: PSYCHIATRY & NEUROLOGY

## 2021-04-12 PROCEDURE — 1090F PRES/ABSN URINE INCON ASSESS: CPT | Performed by: PSYCHIATRY & NEUROLOGY

## 2021-04-12 PROCEDURE — 3288F FALL RISK ASSESSMENT DOCD: CPT | Performed by: PSYCHIATRY & NEUROLOGY

## 2021-04-12 RX ORDER — DICLOFENAC SODIUM 10 MG/G
2 GEL TOPICAL 4 TIMES DAILY
COMMUNITY

## 2021-04-12 RX ORDER — ESTRADIOL 0.1 MG/G
2 CREAM VAGINAL DAILY
COMMUNITY

## 2021-04-12 NOTE — LETTER
4/12/2021 Patient: Katie Colon YOB: 1934 Date of Visit: 4/12/2021 Elvin Petit MD 
[address] [place] [address] [address] [facility] [address] Via Fax: [phone] Dear Elvin Petit MD, Thank you for referring Ms. Katie Colon to [address] [place] [address] for evaluation. My notes for this consultation are attached. Consult Subjective:  
 
Katie Colon is a 80 y. o.right-handed  female seen today at the request of Dr. Jasper Chambers for urgent work in evaluation of new problem of recent fall when she complained of increased back pain and really giving weak and giving way and the patient having difficulty walking because her pain is so severe. She has a known lumbar spinal stenosis and post lumbar laminectomy syndrome and is thought not to be a surgical candidate. She is already seeing a pain management physician but her daughter wanted her evaluated urgently by us. She says she is gotten better over the weekend since talking to the daughter on Friday. Her bowel and bladder function remained stable, and her symptoms seem to follow both legs equally. She is going to see her pain management doctor next week, and already had x-rays of her spine done a week ago at a facility that we cannot view the films on. He will go over those films with her next week. She denies any other recent injuries or falls, and denies any major headache, or any difficulty with her arms or cranial nerves. Most likely her problem is due to her spinal stenosis and post lumbar laminectomy syndrome and not only options of pain management or therapy we will send her to physical therapy and she already has pain management. She is not a surgical candidate because of her age and postop status already.  She is also having more difficulty with pain in her legs, and a burning numbness at times, and she is going to get another epidural steroid injection in the near future with her pain management physician Dr. Frantz Hansen. She is also having increasing tremors when she tries to write or hold things in her hands, right side greater than left, and increasing tremors inside her stomach that she seems preoccupied with. She is better on 25 mg of Mysoline, but the family is concerned because they seem to be getting a little worse again. We told the family we will increase her gabapentin from 300 mg 3 times a day to 300 mg 4 times a day which should hopefully help the tremors and her pain. She is also had several bouts of diverticulitis recently, and some GI pain. She took clonazepam for the abdominal tremors, but we got her off the clonazepam and have her now just on BuSpar. She had a past history of mini strokes. She just had a carotid Doppler study done March 2020 that showed mild disease only and no progression of disease. She is try to exercise more. She is also seen for increasing difficulty with back pain and has had many epidural steroid injection in the past, but they do not seem to be working as well now. .  Patient had a spinal cord stimulator inserted which is not helped all that much so far, may be a little, and so far she had recent radiofrequency ablation of the spinal nerve, and a medial branch denervation procedure or injection to help control her pain which has helped some. She is going to see her pain doctor Dr. Frantz Hansen in another week. She has moved out of her daughter's house is now living alone, and seem to be doing okay. Her memory seems to be okay also and she has no major worsening of the memory. Her swallowing seems better after GI evaluation. Her PCP is monitoring her blood pressure, cholesterol, and she does not smoke, and she try to watch her diet. She takes aspirin daily. She is on blood pressure pills and cholesterol medications. She has chronic back pain, and is under pain management. We cut down her gabapentin to 300 mg twice a day, and she is doing a little better with that. We discussed her condition with the patient and her daughter at length. She continues to take the Aricept, does not think she needs any new medication. She continues her vitamins and vitamin D on a regular basis. She also takes B12. .  She has had no more falls, and continues a regular exercise program.  That all seems to be somewhat better. She denies any headache, any increased neck pain, but does have chronic low back pain and post lumbar laminectomy syndrome and is followed by pain management physician with intermittent injections in her spine. This may be also contributing to her gait instability. She had a normal CT of the head 2 years ago because of her ataxia, and did physical therapy. She still drives and has not had an accident but only drives locally. She feels no problem doing it. She has also seen for increasing back pain and has spinal cord stimulator insertion for post lumbar laminectomy syndrome. She has weakness in her legs and difficulty walking, and she had an MRI scan of the lumbar spine apparently shows a disc and spinal stenosis. She doesn't seem to have much new weakness in the legs, and her bowel and bladder function is stable except for increasing difficulty emptying her bladder. She is seeing a urologist. She had previous back surgery and has a postlaminectomy syndrome, and now a new lumbar disc and spinal stenosis. Besides the weakness in her legs, she's had no other new weakness, sensory loss visual changes or headaches, or other new neurological problems except for the neurogenic bladder. She is using a cane or walker for ambulation because of her back pain. For her increasing back pain we offered her physical therapy but she would rather try her own exercises at home. She Has had no other new medical problems complications or illnesses except for a bout of diverticulitis. She is on Aricept 10 mg a day and is on clonazepam .5 mg each day.  She no longer uses Ambien for sleep and takes vitamins regularly. Her past medical history is pertinent for hypertension, depression, GERD, asthma, arthritis, post laminectomy syndrome, lumbar radiculopathy, a central tremor, dementia, ataxic gait, cataract surgery, cervical fusion, right carpal tunnel syndrome, post cervical laminectomy fusion. Hysterectomy and bilateral salpingo-oophorectomy, gallbladder surgery, right carpal tunnel surgery. Past Medical History:  
Diagnosis Date  Arthritis  Asthma  Chronic pain  Dementia ([address]  GERD (gastroesophageal reflux disease)  Hypertension  Vertigo Past Surgical History:  
Procedure Laterality Date  HX APPENDECTOMY Filiberto Subhash BACK SURGERY  05/21/2015  
 stimulator [employer]  HX CATARACT REMOVAL    
 bilateral  
 HX CERVICAL FUSION    
 anterior disc fusion  HX HEENT    
 uppp  HX LAP CHOLECYSTECTOMY  HX ORTHOPAEDIC    
 right carpal tunnel release  HX MATT AND BSO Family History Problem Relation Age of Onset  Stroke Mother  Cancer Father Social History Tobacco Use  Smoking status: Former Smoker  Smokeless tobacco: Never Used  Tobacco comment: stopped 25 plus years ago Substance Use Topics  Alcohol use: No  
   
Current Outpatient Medications Medication Sig Dispense Refill  diclofenac (VOLTAREN) 1 % gel Apply 2 g to affected area four (4) times daily.  estradioL (Estrace) 0.01 % (0.1 mg/gram) vaginal cream Insert 2 g into vagina daily.  busPIRone (BUSPAR) 10 mg tablet TAKE 1 TABLET BY MOUTH THREE TIMES DAILY AFTER MEALS 100 Tab 5  cholecalciferol (Vitamin D3) 25 mcg (1,000 unit) cap Take  by mouth daily.  gabapentin (NEURONTIN) 300 mg capsule Take 1 Cap by mouth four (4) times daily.  Max Daily Amount: 1,200 mg. 120 Cap 5  
 donepeziL (ARICEPT) 10 mg tablet TAKE 1 TABLET BY MOUTH DAILY 90 Tab 3  
 BREO ELLIPTA 100-25 mcg/dose inhaler INHALE 1 PUFF D    
 primidone (MYSOLINE) 50 mg tablet Take 0.5 Tabs by mouth nightly. 90 Tab 4  
 albuterol sulfate (PROAIR RESPICLICK) 90 mcg/actuation aepb ProAir RespiClick 90 mcg/actuation breath activated  traMADol (ULTRAM) 50 mg tablet Take 50 mg by mouth every six (6) hours as needed for Pain.  montelukast (SINGULAIR) 10 mg tablet TK 1 T PO D  3  
 CYANOCOBALAMIN, VITAMIN B-12, (VITAMIN B-12 PO) Take  by mouth daily.  aspirin delayed-release 81 mg tablet Take  by mouth daily.  colestipol (COLESTID) 1 gram tablet Take 1 g by mouth two (2) times a day.  FB-FY-MI-Fe-Min-Lycopen-Lutein (CENTRUM) 0.4-162-18 mg tab Take  by mouth.  traZODone (DESYREL) 50 mg tablet Take 50 mg by mouth nightly.  dicyclomine (BENTYL) 10 mg capsule Take 10 mg by mouth four (4) times daily.  losartan (COZAAR) 100 mg tablet Take 100 mg by mouth daily.  sertraline (ZOLOFT) 100 mg tablet Take 100 mg by mouth every morning.  omeprazole (PRILOSEC) 40 mg capsule Take 40 mg by mouth daily. Allergies Allergen Reactions  Adhesive Tape Other (comments)  
  hurts the area it is placed on Review of Systems: A comprehensive review of systems was negative except for: Musculoskeletal: positive for myalgias, arthralgias, stiff joints and back pain Neurological: positive for memory problems and tremor Behvioral/Psych: positive for anxiety and depression Objective: I 
 
 
NEUROLOGICAL EXAM: 
 
Appearance: The patient is well developed, well nourished, provides a fair history and is in no acute distress. Mental Status: Oriented to time, place and person and the president, but misses the day of the month. Her speech is 100% intelligible, and she has mild cognitive impairment because of her dementia. She can remember only one of 3 words at 30 seconds with distraction, and cannot subtract serial sevens, or spell the word world backward, or draw a clock that shows a time 10 minutes to 11.  Patient is a little slow mentally for some recent memory loss but actually functioning fairly well in her ADLs. Mood and affect slightly anxious and depressed. Cranial Nerves:   Intact visual fields. Fundi are benign, discs are flat no lesions seen on funduscopy. . FRANK, EOM's full, no nystagmus, no ptosis. Facial sensation is normal. Corneal reflexes are not tested. Facial movement is symmetric. Hearing is abnormal bilaterally. Palate is midline with normal sternocleidomastoid and trapezius muscles are normal. Tongue is midline. Neck without meningismus or bruits Temporal arteries are not tender or enlarged Motor:  4/5 strength in upper and lower proximal and distal muscles. Normal bulk and tone. No fasciculations. Patient has negative straight leg raising test negative in the sitting position but has percussion tenderness over the lumbar spine Patient had a spinal stimulator in the lumbar spine with stable surgical incisions Rapid altering movement is slow in all extremities Reflexes:   Deep tendon reflexes 1+/4 and symmetrical. No Babinski or clonus present Sensory:   Normal to touch, pinprick and DSS, and temperature and vibration mildly decreased in both lower extremities. Gait:  Abnormal gait for age, as she does move slowly due to arthritis in her back, walk with a limp on her right leg, and normally uses a Rollator for ambulation. Tremor:   Mild intention tremor noted. Cerebellar:  Mldly abnormal Romberg and tandem cerebellar signs present And finger-nose-finger examination shows no dysmetria. Neurovascular:  Normal heart sounds and regular rhythm, peripheral pulses decreased in both feet, and no carotid bruits. Assessment:  
 
 
Plan:  
 
New problem of recent falls, we advised the patient that she needs to always use her walker when leaving the house, and always bring her emergency call button with her.  
Patient is to continue her pain management, and see Dr. Ezekiel Jaramillo next week, and hopefully get an epidural steroid injection if she needs it. She is not a surgical candidate, our only other option is physical therapy we sent her to physical therapy for gait training strengthening and therapy to her back and neck to help with her lumbar radiculopathy. Patient dementia seems stable, and she is already on Aricept for that, will continue that medication. Patient has essential tremor, that seems to be stable on 25 mg of Mysoline each night, we will continue that medication. Patient initially wanted the Mysoline increased because of her abdominal tremors, plateaued continue the BuSpar and leave the Mysoline 25 mg, to try to keep her sedation down, and she mentioned getting back on clonazepam twice a week cannot do that because it doubles her risk of death. If the tremor bothers her more she can see GI. Patient encouraged that she must keep exercising to try to maintain her strength and physical ability but she does not want any more physical therapy now. Because of increasing right leg radicular pain, we will increase her gabapentin to 4 times a day she is having no side effect on the current dose of medication, hopefully this will help relieve the pain and also help her slightly progressive tremors of essential type that she has in her hands. Granddaughter wanted her to increase her Mysoline, but I told her we should do one thing at a time increasing the gabapentin will be more beneficial at this time to treat both her radiculopathy in the right leg and tremor. She with new problem of increasing tremors, look more like benign essential tremor, and are interfering with her ability to function, so we will continue her on Mysoline 50 mg at nighttime advance her gabapentin as above.  
Her back pain is increasing, despite numerous epidural injections and nerve ablations, and spinal cord stimulator, and advises not much else to do other than pain management and try some physical therapy but she would rather try exercise program on her own at home. Patient has had a carotid Doppler study that was okay March 2020 showed mild disease only. She is had no recurrent TIA symptoms. He still complains of the quivering in her stomach, so we will continue the Mysoline see if that helps that because it might be part of her essential tremor and her BuSpar and her gabapentin Because of her back pain I suggested she stay on the gabapentin 300 mg 4 times a day since she is to be tolerating that well without drowsiness side effect and does seem be helping the pain. We discussed her condition with the patient and her grand daughter in detail. We encourage her to remain active and try to do her back exercises in addition and continue seeing her pain management physician Patient is encouraged to continue her vitamins, vitamin D, remain mentally and physically active, and start exercising more 35 minutes spent with patient and the daughter, and advised her that she has a progressive degenerative brain condition that will only get worse with time. CT of the head and metabolic parameters were all normal 12 months ago Patient is to continue her other medication and try to continue a regular exercise program 
Patient is to see her pain management specialist, for continued treatment and spinal cord stimulator followup Patient is to continue current medications and start exercising more We discussed with her and her grand daughter in detail, if there is any doubt about her driving ability she should stop immediately or get tested by [facility] Patient will be seen again in 6 months time or earlier as needed. She will call if any problem in the interim. Discuss her condition with patient and daughters at length,  
Time of visit was 35 minutes today She is to keep the lights on in the house at night and be careful when she moves or changes position quickly She will call if any problem in the interim. Signed By: Uma Ramirez MD   
 April 12, 2021 If you have questions, please do not hesitate to call me. I look forward to following your patient along with you. Sincerely, Marnie Marsh MD

## 2021-04-13 NOTE — PROGRESS NOTES
Consult    Subjective:     Magaly Martins is a 80 y. o.right-handed  female seen today at the request of Dr. Freya Ambrose for urgent work in evaluation of new problem of recent fall when she complained of increased back pain and really giving weak and giving way and the patient having difficulty walking because her pain is so severe. She has a known lumbar spinal stenosis and post lumbar laminectomy syndrome and is thought not to be a surgical candidate. She is already seeing a pain management physician but her daughter wanted her evaluated urgently by us. She says she is gotten better over the weekend since talking to the daughter on Friday. Her bowel and bladder function remained stable, and her symptoms seem to follow both legs equally. She is going to see her pain management doctor next week, and already had x-rays of her spine done a week ago at a facility that we cannot view the films on. He will go over those films with her next week. She denies any other recent injuries or falls, and denies any major headache, or any difficulty with her arms or cranial nerves. Most likely her problem is due to her spinal stenosis and post lumbar laminectomy syndrome and not only options of pain management or therapy we will send her to physical therapy and she already has pain management. She is not a surgical candidate because of her age and postop status already. She is also having more difficulty with pain in her legs, and a burning numbness at times, and she is going to get another epidural steroid injection in the near future with her pain management physician Dr. Amaury Ludwig. She is also having increasing tremors when she tries to write or hold things in her hands, right side greater than left, and increasing tremors inside her stomach that she seems preoccupied with. She is better on 25 mg of Mysoline, but the family is concerned because they seem to be getting a little worse again.   We told the family we will increase her gabapentin from 300 mg 3 times a day to 300 mg 4 times a day which should hopefully help the tremors and her pain. She is also had several bouts of diverticulitis recently, and some GI pain. She took clonazepam for the abdominal tremors, but we got her off the clonazepam and have her now just on BuSpar. She had a past history of mini strokes. She just had a carotid Doppler study done March 2020 that showed mild disease only and no progression of disease. She is try to exercise more. She is also seen for increasing difficulty with back pain and has had many epidural steroid injection in the past, but they do not seem to be working as well now. .  Patient had a spinal cord stimulator inserted which is not helped all that much so far, may be a little, and so far she had recent radiofrequency ablation of the spinal nerve, and a medial branch denervation procedure or injection to help control her pain which has helped some. She is going to see her pain doctor Dr. Philippe Lennox in another week. She has moved out of her daughter's house is now living alone, and seem to be doing okay. Her memory seems to be okay also and she has no major worsening of the memory. Her swallowing seems better after GI evaluation. Her PCP is monitoring her blood pressure, cholesterol, and she does not smoke, and she try to watch her diet. She takes aspirin daily. She is on blood pressure pills and cholesterol medications. She has chronic back pain, and is under pain management. We cut down her gabapentin to 300 mg twice a day, and she is doing a little better with that. We discussed her condition with the patient and her daughter at length. She continues to take the Aricept, does not think she needs any new medication. She continues her vitamins and vitamin D on a regular basis. She also takes B12. .  She has had no more falls, and continues a regular exercise program.  That all seems to be somewhat better.     She denies any headache, any increased neck pain, but does have chronic low back pain and post lumbar laminectomy syndrome and is followed by pain management physician with intermittent injections in her spine. This may be also contributing to her gait instability. She had a normal CT of the head 2 years ago because of her ataxia, and did physical therapy. She still drives and has not had an accident but only drives locally. She feels no problem doing it. She has also seen for increasing back pain and has spinal cord stimulator insertion for post lumbar laminectomy syndrome. She has weakness in her legs and difficulty walking, and she had an MRI scan of the lumbar spine apparently shows a disc and spinal stenosis. She doesn't seem to have much new weakness in the legs, and her bowel and bladder function is stable except for increasing difficulty emptying her bladder. She is seeing a urologist. She had previous back surgery and has a postlaminectomy syndrome, and now a new lumbar disc and spinal stenosis. Besides the weakness in her legs, she's had no other new weakness, sensory loss visual changes or headaches, or other new neurological problems except for the neurogenic bladder. She is using a cane or walker for ambulation because of her back pain. For her increasing back pain we offered her physical therapy but she would rather try her own exercises at home. She Has had no other new medical problems complications or illnesses except for a bout of diverticulitis. She is on Aricept 10 mg a day and is on clonazepam .5 mg each day. She no longer uses Ambien for sleep and takes vitamins regularly. Her past medical history is pertinent for hypertension, depression, GERD, asthma, arthritis, post laminectomy syndrome, lumbar radiculopathy, a central tremor, dementia, ataxic gait, cataract surgery, cervical fusion, right carpal tunnel syndrome, post cervical laminectomy fusion.  Hysterectomy and bilateral salpingo-oophorectomy, gallbladder surgery, right carpal tunnel surgery. Past Medical History:   Diagnosis Date    Arthritis     Asthma     Chronic pain     Dementia (HCC)     GERD (gastroesophageal reflux disease)     Hypertension     Vertigo       Past Surgical History:   Procedure Laterality Date    HX APPENDECTOMY      HX BACK SURGERY  05/21/2015    stimulator [employer]     HX CATARACT REMOVAL      bilateral    HX CERVICAL FUSION      anterior disc fusion    HX HEENT      uppp    HX LAP CHOLECYSTECTOMY      HX ORTHOPAEDIC      right carpal tunnel release    HX MATT AND BSO       Family History   Problem Relation Age of Onset    Stroke Mother     Cancer Father       Social History     Tobacco Use    Smoking status: Former Smoker    Smokeless tobacco: Never Used    Tobacco comment: stopped 25 plus years ago   Substance Use Topics    Alcohol use: No       Current Outpatient Medications   Medication Sig Dispense Refill    diclofenac (VOLTAREN) 1 % gel Apply 2 g to affected area four (4) times daily.  estradioL (Estrace) 0.01 % (0.1 mg/gram) vaginal cream Insert 2 g into vagina daily.  busPIRone (BUSPAR) 10 mg tablet TAKE 1 TABLET BY MOUTH THREE TIMES DAILY AFTER MEALS 100 Tab 5    cholecalciferol (Vitamin D3) 25 mcg (1,000 unit) cap Take  by mouth daily.  gabapentin (NEURONTIN) 300 mg capsule Take 1 Cap by mouth four (4) times daily. Max Daily Amount: 1,200 mg. 120 Cap 5    donepeziL (ARICEPT) 10 mg tablet TAKE 1 TABLET BY MOUTH DAILY 90 Tab 3    BREO ELLIPTA 100-25 mcg/dose inhaler INHALE 1 PUFF D      primidone (MYSOLINE) 50 mg tablet Take 0.5 Tabs by mouth nightly. 90 Tab 4    albuterol sulfate (PROAIR RESPICLICK) 90 mcg/actuation aepb ProAir RespiClick 90 mcg/actuation breath activated      traMADol (ULTRAM) 50 mg tablet Take 50 mg by mouth every six (6) hours as needed for Pain.       montelukast (SINGULAIR) 10 mg tablet TK 1 T PO D  3    CYANOCOBALAMIN, VITAMIN B-12, (VITAMIN B-12 PO) Take  by mouth daily.  aspirin delayed-release 81 mg tablet Take  by mouth daily.  colestipol (COLESTID) 1 gram tablet Take 1 g by mouth two (2) times a day.  SU-RA-OW-Fe-Min-Lycopen-Lutein (CENTRUM) 0.4-162-18 mg tab Take  by mouth.  traZODone (DESYREL) 50 mg tablet Take 50 mg by mouth nightly.  dicyclomine (BENTYL) 10 mg capsule Take 10 mg by mouth four (4) times daily.  losartan (COZAAR) 100 mg tablet Take 100 mg by mouth daily.  sertraline (ZOLOFT) 100 mg tablet Take 100 mg by mouth every morning.  omeprazole (PRILOSEC) 40 mg capsule Take 40 mg by mouth daily. Allergies   Allergen Reactions    Adhesive Tape Other (comments)     hurts the area it is placed on        Review of Systems:  A comprehensive review of systems was negative except for: Musculoskeletal: positive for myalgias, arthralgias, stiff joints and back pain  Neurological: positive for memory problems and tremor  Behvioral/Psych: positive for anxiety and depression     Objective:     I      NEUROLOGICAL EXAM:    Appearance: The patient is well developed, well nourished, provides a fair history and is in no acute distress. Mental Status: Oriented to time, place and person and the president, but misses the day of the month. Her speech is 100% intelligible, and she has mild cognitive impairment because of her dementia. She can remember only one of 3 words at 30 seconds with distraction, and cannot subtract serial sevens, or spell the word world backward, or draw a clock that shows a time 10 minutes to 11. Patient is a little slow mentally for some recent memory loss but actually functioning fairly well in her ADLs. Mood and affect slightly anxious and depressed. Cranial Nerves:   Intact visual fields. Fundi are benign, discs are flat no lesions seen on funduscopy. . FRANK, EOM's full, no nystagmus, no ptosis. Facial sensation is normal. Corneal reflexes are not tested. Facial movement is symmetric. Hearing is abnormal bilaterally. Palate is midline with normal sternocleidomastoid and trapezius muscles are normal. Tongue is midline. Neck without meningismus or bruits  Temporal arteries are not tender or enlarged   Motor:  4/5 strength in upper and lower proximal and distal muscles. Normal bulk and tone. No fasciculations. Patient has negative straight leg raising test negative in the sitting position but has percussion tenderness over the lumbar spine  Patient had a spinal stimulator in the lumbar spine with stable surgical incisions  Rapid altering movement is slow in all extremities   Reflexes:   Deep tendon reflexes 1+/4 and symmetrical. No Babinski or clonus present   Sensory:   Normal to touch, pinprick and DSS, and temperature and vibration mildly decreased in both lower extremities. Gait:  Abnormal gait for age, as she does move slowly due to arthritis in her back, walk with a limp on her right leg, and normally uses a Rollator for ambulation. Tremor:   Mild intention tremor noted. Cerebellar:  Mldly abnormal Romberg and tandem cerebellar signs present  And finger-nose-finger examination shows no dysmetria. Neurovascular:  Normal heart sounds and regular rhythm, peripheral pulses decreased in both feet, and no carotid bruits. Assessment:       Plan:     New problem of recent falls, we advised the patient that she needs to always use her walker when leaving the house, and always bring her emergency call button with her. Patient is to continue her pain management, and see Dr. Amaury Ludwig next week, and hopefully get an epidural steroid injection if she needs it. She is not a surgical candidate, our only other option is physical therapy we sent her to physical therapy for gait training strengthening and therapy to her back and neck to help with her lumbar radiculopathy.   Patient dementia seems stable, and she is already on Aricept for that, will continue that medication. Patient has essential tremor, that seems to be stable on 25 mg of Mysoline each night, we will continue that medication. Patient initially wanted the Mysoline increased because of her abdominal tremors, plateaued continue the BuSpar and leave the Mysoline 25 mg, to try to keep her sedation down, and she mentioned getting back on clonazepam twice a week cannot do that because it doubles her risk of death. If the tremor bothers her more she can see GI. Patient encouraged that she must keep exercising to try to maintain her strength and physical ability but she does not want any more physical therapy now. Because of increasing right leg radicular pain, we will increase her gabapentin to 4 times a day she is having no side effect on the current dose of medication, hopefully this will help relieve the pain and also help her slightly progressive tremors of essential type that she has in her hands. Granddaughter wanted her to increase her Mysoline, but I told her we should do one thing at a time increasing the gabapentin will be more beneficial at this time to treat both her radiculopathy in the right leg and tremor. She with new problem of increasing tremors, look more like benign essential tremor, and are interfering with her ability to function, so we will continue her on Mysoline 50 mg at nighttime advance her gabapentin as above. Her back pain is increasing, despite numerous epidural injections and nerve ablations, and spinal cord stimulator, and advises not much else to do other than pain management and try some physical therapy but she would rather try exercise program on her own at home. Patient has had a carotid Doppler study that was okay March 2020 showed mild disease only. She is had no recurrent TIA symptoms.   He still complains of the quivering in her stomach, so we will continue the Mysoline see if that helps that because it might be part of her essential tremor and her BuSpar and her gabapentin  Because of her back pain I suggested she stay on the gabapentin 300 mg 4 times a day since she is to be tolerating that well without drowsiness side effect and does seem be helping the pain. We discussed her condition with the patient and her grand daughter in detail. We encourage her to remain active and try to do her back exercises in addition and continue seeing her pain management physician  Patient is encouraged to continue her vitamins, vitamin D, remain mentally and physically active, and start exercising more  35 minutes spent with patient and the daughter, and advised her that she has a progressive degenerative brain condition that will only get worse with time. CT of the head and metabolic parameters were all normal 12 months ago  Patient is to continue her other medication and try to continue a regular exercise program  Patient is to see her pain management specialist, for continued treatment and spinal cord stimulator followup  Patient is to continue current medications and start exercising more   We discussed with her and her grand daughter in detail, if there is any doubt about her driving ability she should stop immediately or get tested by [facility]  Patient will be seen again in 6 months time or earlier as needed. She will call if any problem in the interim. Discuss her condition with patient and daughters at length,   Time of visit was 35 minutes today  She is to keep the lights on in the house at night and be careful when she moves or changes position quickly  She will call if any problem in the interim.     Signed By: Cyndee Wilburn MD     April 12, 2021

## 2021-04-14 DIAGNOSIS — R26.0 ATAXIC GAIT: ICD-10-CM

## 2021-04-14 DIAGNOSIS — M79.7 FIBROMYALGIA: ICD-10-CM

## 2021-04-14 DIAGNOSIS — I65.23 STENOSIS OF BOTH INTERNAL CAROTID ARTERIES: ICD-10-CM

## 2021-04-14 DIAGNOSIS — G30.0 DEMENTIA IN ALZHEIMER'S DISEASE WITH EARLY ONSET WITHOUT BEHAVIORAL DISTURBANCE (HCC): ICD-10-CM

## 2021-04-14 DIAGNOSIS — G25.0 BENIGN ESSENTIAL TREMOR SYNDROME: ICD-10-CM

## 2021-04-14 DIAGNOSIS — F02.80 DEMENTIA IN ALZHEIMER'S DISEASE WITH EARLY ONSET WITHOUT BEHAVIORAL DISTURBANCE (HCC): ICD-10-CM

## 2021-04-14 DIAGNOSIS — M96.1 LUMBAR POST-LAMINECTOMY SYNDROME: ICD-10-CM

## 2021-04-14 DIAGNOSIS — G30.1 LATE ONSET ALZHEIMER'S DISEASE WITHOUT BEHAVIORAL DISTURBANCE (HCC): ICD-10-CM

## 2021-04-14 DIAGNOSIS — I67.89 CEREBRAL MICROVASCULAR DISEASE: ICD-10-CM

## 2021-04-14 DIAGNOSIS — M48.061 SPINAL STENOSIS OF LUMBAR REGION WITHOUT NEUROGENIC CLAUDICATION: ICD-10-CM

## 2021-04-14 DIAGNOSIS — F02.80 LATE ONSET ALZHEIMER'S DISEASE WITHOUT BEHAVIORAL DISTURBANCE (HCC): ICD-10-CM

## 2021-04-14 RX ORDER — GABAPENTIN 300 MG/1
300 CAPSULE ORAL 4 TIMES DAILY
Qty: 120 CAP | Refills: 5 | Status: SHIPPED | OUTPATIENT
Start: 2021-04-14 | End: 2021-10-04 | Stop reason: SDUPTHER

## 2021-04-14 NOTE — TELEPHONE ENCOUNTER
Received a fax from [facility] patient requesting a refill of Gabapentin. Please review and refill if warranted.

## 2021-05-04 DIAGNOSIS — G30.0 DEMENTIA IN ALZHEIMER'S DISEASE WITH EARLY ONSET WITHOUT BEHAVIORAL DISTURBANCE (HCC): ICD-10-CM

## 2021-05-04 DIAGNOSIS — M48.061 SPINAL STENOSIS OF LUMBAR REGION WITHOUT NEUROGENIC CLAUDICATION: ICD-10-CM

## 2021-05-04 DIAGNOSIS — I65.23 STENOSIS OF BOTH INTERNAL CAROTID ARTERIES: ICD-10-CM

## 2021-05-04 DIAGNOSIS — M79.7 FIBROMYALGIA: ICD-10-CM

## 2021-05-04 DIAGNOSIS — F02.80 LATE ONSET ALZHEIMER'S DISEASE WITHOUT BEHAVIORAL DISTURBANCE (HCC): ICD-10-CM

## 2021-05-04 DIAGNOSIS — I67.89 CEREBRAL MICROVASCULAR DISEASE: ICD-10-CM

## 2021-05-04 DIAGNOSIS — R26.0 ATAXIC GAIT: ICD-10-CM

## 2021-05-04 DIAGNOSIS — M96.1 LUMBAR POST-LAMINECTOMY SYNDROME: ICD-10-CM

## 2021-05-04 DIAGNOSIS — F02.80 ALZHEIMER'S TYPE DEMENTIA WITH LATE ONSET WITHOUT BEHAVIORAL DISTURBANCE (HCC): ICD-10-CM

## 2021-05-04 DIAGNOSIS — M54.16 LUMBAR RADICULAR PAIN: ICD-10-CM

## 2021-05-04 DIAGNOSIS — G30.1 LATE ONSET ALZHEIMER'S DISEASE WITHOUT BEHAVIORAL DISTURBANCE (HCC): ICD-10-CM

## 2021-05-04 DIAGNOSIS — G25.0 BENIGN ESSENTIAL TREMOR SYNDROME: ICD-10-CM

## 2021-05-04 DIAGNOSIS — F02.80 DEMENTIA IN ALZHEIMER'S DISEASE WITH EARLY ONSET WITHOUT BEHAVIORAL DISTURBANCE (HCC): ICD-10-CM

## 2021-05-04 DIAGNOSIS — G30.1 ALZHEIMER'S TYPE DEMENTIA WITH LATE ONSET WITHOUT BEHAVIORAL DISTURBANCE (HCC): ICD-10-CM

## 2021-05-04 DIAGNOSIS — E03.4 HYPOTHYROIDISM DUE TO ACQUIRED ATROPHY OF THYROID: ICD-10-CM

## 2021-05-04 RX ORDER — PRIMIDONE 50 MG/1
25 TABLET ORAL
Qty: 90 TAB | Refills: 4 | Status: SHIPPED | OUTPATIENT
Start: 2021-05-04 | End: 2021-10-24

## 2021-05-04 NOTE — TELEPHONE ENCOUNTER
Future Appointments   Date Time Provider Durga Medel   6/21/2021  3:20 PM MD EBNOIT Gonzales BS AMB   10/4/2021 11:00 AM MD BENOIT Gonzales BS AMB                         Last Appointment My Department:  4/12/2021    Please review and send in refill below if warranted. Requested Prescriptions     Pending Prescriptions Disp Refills    primidone (Mysoline) 50 mg tablet 90 Tab 4     Sig: Take 0.5 Tabs by mouth nightly.

## 2021-06-21 ENCOUNTER — OFFICE VISIT (OUTPATIENT)
Dept: NEUROLOGY | Age: 86
End: 2021-06-21
Payer: MEDICARE

## 2021-06-21 VITALS
DIASTOLIC BLOOD PRESSURE: 64 MMHG | BODY MASS INDEX: 28.66 KG/M2 | SYSTOLIC BLOOD PRESSURE: 128 MMHG | HEART RATE: 83 BPM | OXYGEN SATURATION: 83 % | HEIGHT: 65 IN | WEIGHT: 172 LBS

## 2021-06-21 DIAGNOSIS — G25.0 BENIGN ESSENTIAL TREMOR SYNDROME: ICD-10-CM

## 2021-06-21 DIAGNOSIS — F02.80 ALZHEIMER'S TYPE DEMENTIA WITH LATE ONSET WITHOUT BEHAVIORAL DISTURBANCE (HCC): ICD-10-CM

## 2021-06-21 DIAGNOSIS — M54.16 LUMBAR RADICULAR PAIN: ICD-10-CM

## 2021-06-21 DIAGNOSIS — G30.1 LATE ONSET ALZHEIMER'S DISEASE WITHOUT BEHAVIORAL DISTURBANCE (HCC): ICD-10-CM

## 2021-06-21 DIAGNOSIS — F02.80 DEMENTIA IN ALZHEIMER'S DISEASE WITH EARLY ONSET WITHOUT BEHAVIORAL DISTURBANCE (HCC): Primary | ICD-10-CM

## 2021-06-21 DIAGNOSIS — G30.1 ALZHEIMER'S TYPE DEMENTIA WITH LATE ONSET WITHOUT BEHAVIORAL DISTURBANCE (HCC): ICD-10-CM

## 2021-06-21 DIAGNOSIS — G30.0 DEMENTIA IN ALZHEIMER'S DISEASE WITH EARLY ONSET WITHOUT BEHAVIORAL DISTURBANCE (HCC): Primary | ICD-10-CM

## 2021-06-21 DIAGNOSIS — F02.80 LATE ONSET ALZHEIMER'S DISEASE WITHOUT BEHAVIORAL DISTURBANCE (HCC): ICD-10-CM

## 2021-06-21 DIAGNOSIS — I65.23 STENOSIS OF BOTH INTERNAL CAROTID ARTERIES: ICD-10-CM

## 2021-06-21 PROCEDURE — G8419 CALC BMI OUT NRM PARAM NOF/U: HCPCS | Performed by: PSYCHIATRY & NEUROLOGY

## 2021-06-21 PROCEDURE — 3288F FALL RISK ASSESSMENT DOCD: CPT | Performed by: PSYCHIATRY & NEUROLOGY

## 2021-06-21 PROCEDURE — 1100F PTFALLS ASSESS-DOCD GE2>/YR: CPT | Performed by: PSYCHIATRY & NEUROLOGY

## 2021-06-21 PROCEDURE — 1090F PRES/ABSN URINE INCON ASSESS: CPT | Performed by: PSYCHIATRY & NEUROLOGY

## 2021-06-21 PROCEDURE — G8427 DOCREV CUR MEDS BY ELIG CLIN: HCPCS | Performed by: PSYCHIATRY & NEUROLOGY

## 2021-06-21 PROCEDURE — 99214 OFFICE O/P EST MOD 30 MIN: CPT | Performed by: PSYCHIATRY & NEUROLOGY

## 2021-06-21 PROCEDURE — G9717 DOC PT DX DEP/BP F/U NT REQ: HCPCS | Performed by: PSYCHIATRY & NEUROLOGY

## 2021-06-21 PROCEDURE — G8536 NO DOC ELDER MAL SCRN: HCPCS | Performed by: PSYCHIATRY & NEUROLOGY

## 2021-06-21 RX ORDER — ALENDRONATE SODIUM 70 MG/1
TABLET ORAL
COMMUNITY
Start: 2021-04-14

## 2021-06-21 NOTE — LETTER
6/21/2021 Patient: Magaly Martins YOB: 1934 Date of Visit: 6/21/2021 Kory Gee MD 
[address] [address] Via Fax: [phone] Dear Kory Gee MD, Thank you for referring Ms. Magaly Martins to [address] [place] [address] for evaluation. My notes for this consultation are attached. Consult Subjective:  
 
Magaly Martins is a 80 y. o.right-handed  female seen today at the request of Dr. Freya Ambrose for urgent work in evaluation of new problem of patient wanting to know whether she could come off her gabapentin which she is currently taking 300 mg 3 times a day, because she wants to try to come down off some of her medications, even though she is having more trouble with back pain and require more epidural steroid injections. They are talking about activating her spinal cord stimulator again also. She is in pain management and wants to know what else can be done, and we advised her at her age, surgery is contraindicated, and all we have his pain management really. We told her she could try coming off the gabapentin but if her pain level start rising she needs to go back on the medication to try to keep the dose of narcotics down. Her daughter agrees. Her memory still problem sometimes, and she is on Aricept 10 mg a day, does not think she needs more medication, and no she has memory problems seems to be able to function and maintain her ADL activities okay. Still has a lot of family stress and illnesses amongst relatives, and a lot of family stress. She uses a Rollator to ambulate because of her slightly unsteady gait particularly out of the house. She has had no more falls recently using the Rollator. She denies any other recent injuries or falls, and denies any major headache, or any difficulty with her arms or cranial nerves.   Most likely her problem is due to her spinal stenosis and post lumbar laminectomy syndrome and not only options of pain management or therapy we will send her to physical therapy and she already has pain management. She is not a surgical candidate because of her age and postop status already. She is also having more difficulty with pain in her legs, and a burning numbness at times, and she is going to get another epidural steroid injection in the near future with her pain management physician Dr. Rosemarie Vasquez. She is also having increasing tremors when she tries to write or hold things in her hands, right side greater than left, and increasing tremors inside her stomach that she seems preoccupied with. She is better on 25 mg of Mysoline, but the family is concerned because they seem to be getting a little worse again. Her tremors however on exam today do not seem that bad, so we will continue her current medication dose. She remains off the clonazepam which we had to work very hard to get her off of and now on the BuSpar. She had a past history of mini strokes. She just had a carotid Doppler study done March 2020 that showed mild disease only and no progression of disease. She is try to exercise more. She is also seen for increasing difficulty with back pain and has had many epidural steroid injection in the past, but they do not seem to be working as well now. .  Patient had a spinal cord stimulator inserted which is not helped all that much so far, may be a little, and so far she had recent radiofrequency ablation of the spinal nerve, and a medial branch denervation procedure or injection to help control her pain which has helped some. She is going to see her pain doctor Dr. Rosemarie Vasquez in another week. She has moved out of her daughter's house is now living alone, and seem to be doing okay. Her swallowing seems better after GI evaluation. Her PCP is monitoring her blood pressure, cholesterol, and she does not smoke, and she try to watch her diet. She takes aspirin daily.   She is on blood pressure pills and cholesterol medications. She has chronic back pain, and is under pain management. We cut down her gabapentin to 300 mg twice a day, and she is doing a little better with that. We discussed her condition with the patient and her daughter at length. She continues to take the Aricept, does not think she needs any new medication. She continues her vitamins and vitamin D on a regular basis. She also takes B12. .  She has had no more falls, and continues a regular exercise program.  That all seems to be somewhat better. She denies any headache, any increased neck pain, but does have chronic low back pain and post lumbar laminectomy syndrome and is followed by pain management physician with intermittent injections in her spine. This may be also contributing to her gait instability. She had a normal CT of the head 2 years ago because of her ataxia, and did physical therapy. She still drives and has not had an accident but only drives locally. She feels no problem doing it. She has also seen for increasing back pain and has spinal cord stimulator insertion for post lumbar laminectomy syndrome. She has weakness in her legs and difficulty walking, and she had an MRI scan of the lumbar spine apparently shows a disc and spinal stenosis. She doesn't seem to have much new weakness in the legs, and her bowel and bladder function is stable except for increasing difficulty emptying her bladder. She is seeing a urologist. She had previous back surgery and has a postlaminectomy syndrome, and now a new lumbar disc and spinal stenosis. Besides the weakness in her legs, she's had no other new weakness, sensory loss visual changes or headaches, or other new neurological problems except for the neurogenic bladder. She is using a cane or walker for ambulation because of her back pain. For her increasing back pain we offered her physical therapy but she would rather try her own exercises at home.  
 
She Has had no other new medical problems complications or illnesses except for a bout of diverticulitis. She is on Aricept 10 mg a day and is on clonazepam .5 mg each day. She no longer uses Ambien for sleep and takes vitamins regularly. Her past medical history is pertinent for hypertension, depression, GERD, asthma, arthritis, post laminectomy syndrome, lumbar radiculopathy, a central tremor, dementia, ataxic gait, cataract surgery, cervical fusion, right carpal tunnel syndrome, post cervical laminectomy fusion. Hysterectomy and bilateral salpingo-oophorectomy, gallbladder surgery, right carpal tunnel surgery. Past Medical History:  
Diagnosis Date  Arthritis  Asthma  Chronic pain  Dementia ([address]  GERD (gastroesophageal reflux disease)  Hypertension  Vertigo Past Surgical History:  
Procedure Laterality Date  HX APPENDECTOMY Nohemy Bidding BACK SURGERY  05/21/2015  
 stimulator [employer]  HX CATARACT REMOVAL    
 bilateral  
 HX CERVICAL FUSION    
 anterior disc fusion  HX HEENT    
 uppp  HX LAP CHOLECYSTECTOMY  HX ORTHOPAEDIC    
 right carpal tunnel release  HX MATT AND BSO Family History Problem Relation Age of Onset  Stroke Mother  Cancer Father Social History Tobacco Use  Smoking status: Former Smoker  Smokeless tobacco: Never Used  Tobacco comment: stopped 25 plus years ago Substance Use Topics  Alcohol use: No  
   
Current Outpatient Medications Medication Sig Dispense Refill  alendronate (FOSAMAX) 70 mg tablet TAKE 1 TABLET BY MOUTH ONCE WEEKLY ON AN EMPTY STOMACH WITH NOTHING ELSE  primidone (Mysoline) 50 mg tablet Take 0.5 Tabs by mouth nightly. 90 Tab 4  
 gabapentin (NEURONTIN) 300 mg capsule Take 1 Cap by mouth four (4) times daily. Max Daily Amount: 1,200 mg. 120 Cap 5  
 diclofenac (VOLTAREN) 1 % gel Apply 2 g to affected area four (4) times daily.     
 estradioL (Estrace) 0.01 % (0.1 mg/gram) vaginal cream Insert 2 g into vagina daily.  busPIRone (BUSPAR) 10 mg tablet TAKE 1 TABLET BY MOUTH THREE TIMES DAILY AFTER MEALS 100 Tab 5  cholecalciferol (Vitamin D3) 25 mcg (1,000 unit) cap Take  by mouth daily.  donepeziL (ARICEPT) 10 mg tablet TAKE 1 TABLET BY MOUTH DAILY 90 Tab 3  
 BREO ELLIPTA 100-25 mcg/dose inhaler INHALE 1 PUFF D    
 albuterol sulfate (PROAIR RESPICLICK) 90 mcg/actuation aepb ProAir RespiClick 90 mcg/actuation breath activated  traMADol (ULTRAM) 50 mg tablet Take 50 mg by mouth every six (6) hours as needed for Pain.  montelukast (SINGULAIR) 10 mg tablet TK 1 T PO D  3  
 CYANOCOBALAMIN, VITAMIN B-12, (VITAMIN B-12 PO) Take  by mouth daily.  aspirin delayed-release 81 mg tablet Take  by mouth daily.  colestipol (COLESTID) 1 gram tablet Take 1 g by mouth two (2) times a day.  WR-OM-MM-Fe-Min-Lycopen-Lutein (CENTRUM) 0.4-162-18 mg tab Take  by mouth.  traZODone (DESYREL) 50 mg tablet Take 50 mg by mouth nightly.  dicyclomine (BENTYL) 10 mg capsule Take 10 mg by mouth four (4) times daily.  losartan (COZAAR) 100 mg tablet Take 100 mg by mouth daily.  sertraline (ZOLOFT) 100 mg tablet Take 100 mg by mouth every morning.  omeprazole (PRILOSEC) 40 mg capsule Take 40 mg by mouth daily. Allergies Allergen Reactions  Adhesive Tape Other (comments)  
  hurts the area it is placed on Review of Systems: A comprehensive review of systems was negative except for: Musculoskeletal: positive for myalgias, arthralgias, stiff joints and back pain Neurological: positive for memory problems and tremor Behvioral/Psych: positive for anxiety and depression Objective: I 
 
 
NEUROLOGICAL EXAM: 
 
Appearance: The patient is well developed, well nourished, provides a fair history and is in no acute distress. Mental Status: Oriented to time, place and person and the president, but misses the day of the month.  Her speech is 100% intelligible, and she has mild cognitive impairment because of her dementia. She can remember only one of 3 words at 30 seconds with distraction, and cannot subtract serial sevens, or spell the word world backward, or draw a clock that shows a time 10 minutes to 11. Patient is a little slow mentally for some recent memory loss but actually functioning fairly well in her ADLs. Mood and affect slightly anxious and depressed. Cranial Nerves:   Intact visual fields. Fundi are benign, discs are flat no lesions seen on funduscopy. . FRANK, EOM's full, no nystagmus, no ptosis. Facial sensation is normal. Corneal reflexes are not tested. Facial movement is symmetric. Hearing is abnormal bilaterally. Palate is midline with normal sternocleidomastoid and trapezius muscles are normal. Tongue is midline. Neck without meningismus or bruits Temporal arteries are not tender or enlarged Motor:  4/5 strength in upper and lower proximal and distal muscles. Normal bulk and tone. No fasciculations. Patient has negative straight leg raising test negative in the sitting position but has percussion tenderness over the lumbar spine Patient had a spinal stimulator in the lumbar spine with stable surgical incisions Rapid altering movement is slow in all extremities Reflexes:   Deep tendon reflexes 1+/4 and symmetrical. No Babinski or clonus present Sensory:   Normal to touch, pinprick and DSS, and temperature and vibration mildly decreased in both lower extremities. Gait:  Abnormal gait for age, as she does move slowly due to arthritis in her back, walk with a limp on her right leg, and normally uses a Rollator for ambulation. Tremor:   Mild intention tremor noted. Cerebellar:  Mldly abnormal Romberg and tandem cerebellar signs present And finger-nose-finger examination shows no dysmetria. Neurovascular:  Normal heart sounds and regular rhythm, peripheral pulses decreased in both feet, and no carotid bruits. Assessment:  
 
 
Plan:  
 
New problem of of wanting to taper off her Neurontin, and we told her it was possible but to watch for increased pain because she is already having more pain taking more injections from Dr. Nigel Gaines and they I talked about reactivating her spinal cord stimulator and we advised the patient to go down 1 pill every 2 to 3 weeks if tolerated on the gabapentin. Patient is to continue her pain management, and see Dr. Philippe Lennox next week, and hopefully get an epidural steroid injection if she needs it. She is not a surgical candidate, our only other option is physical therapy we sent her to physical therapy for gait training strengthening and therapy to her back and neck to help with her lumbar radiculopathy. Patient dementia seems stable, and she is already on Aricept for that, will continue that medication, neither family nor patient wants Namenda yet. Patient has essential tremor, that seems to be stable on 25 mg of Mysoline each night, we will continue that medication. Patient initially wanted the Mysoline increased because of her abdominal tremors, but these have plateaued and we will continue the BuSpar and leave the Mysoline 25 mg, to try to keep her sedation down, and she mentioned getting back on clonazepam twice a week cannot do that because it doubles her risk of death. If the tremor bothers her more she can see GI. Patient encouraged that she must keep exercising to try to maintain her strength and physical ability but she does not want any more physical therapy now. Because of increasing right leg radicular pain, we will increase her gabapentin to 4 times a day she is having no side effect on the current dose of medication, hopefully this will help relieve the pain and also help her slightly progressive tremors of essential type that she has in her hands. Patient still has some pain with radiculopathy into her arm some neck arthritis She with new problem of increasing tremors, look more like benign essential tremor, and are interfering with her ability to function, so we will continue her on Mysoline 50 mg at nighttime advance her gabapentin as above. Her back pain is increasing, despite numerous epidural injections and nerve ablations, and spinal cord stimulator, and advises not much else to do other than pain management and try some physical therapy but she would rather try exercise program on her own at home. Patient has had a carotid Doppler study that was okay March 2020 showed mild disease only. She is had no recurrent TIA symptoms. We discussed her condition with the patient and her daughter in detail. We encourage her to remain active and try to do her back exercises in addition and continue seeing her pain management physician Patient is encouraged to continue her vitamins, vitamin D, remain mentally and physically active, and start exercising more 35 minutes spent with patient and the daughter, and advised her that she has a progressive degenerative brain condition that will only get worse with time. CT of the head and metabolic parameters were all normal 12 months ago Patient is to continue her other medication and try to continue a regular exercise program 
Patient is to see her pain management specialist, for continued treatment and spinal cord stimulator followup Patient is to continue current medications and start exercising more We discussed with her and her grand daughter in detail, if there is any doubt about her driving ability she should stop immediately or get tested by [facility] Patient will be seen again in 6 months time or earlier as needed. She will call if any problem in the interim. Discuss her condition with patient and daughters at length,  
Time of visit was 35 minutes today She is to keep the lights on in the house at night and be careful when she moves or changes position quickly She will call if any problem in the interim.  
 
Signed By: Hakan Tran Niko Hill MD   
 June 21, 2021 If you have questions, please do not hesitate to call me. I look forward to following your patient along with you. Sincerely, Katelyn Stearns MD

## 2021-06-22 NOTE — PROGRESS NOTES
Consult    Subjective:     Neris Cole is a 80 y. o.right-handed  female seen today at the request of Dr. Janessa Paul for urgent work in evaluation of new problem of patient wanting to know whether she could come off her gabapentin which she is currently taking 300 mg 3 times a day, because she wants to try to come down off some of her medications, even though she is having more trouble with back pain and require more epidural steroid injections. They are talking about activating her spinal cord stimulator again also. She is in pain management and wants to know what else can be done, and we advised her at her age, surgery is contraindicated, and all we have his pain management really. We told her she could try coming off the gabapentin but if her pain level start rising she needs to go back on the medication to try to keep the dose of narcotics down. Her daughter agrees. Her memory still problem sometimes, and she is on Aricept 10 mg a day, does not think she needs more medication, and no she has memory problems seems to be able to function and maintain her ADL activities okay. Still has a lot of family stress and illnesses amongst relatives, and a lot of family stress. She uses a Rollator to ambulate because of her slightly unsteady gait particularly out of the house. She has had no more falls recently using the Rollator. She denies any other recent injuries or falls, and denies any major headache, or any difficulty with her arms or cranial nerves. Most likely her problem is due to her spinal stenosis and post lumbar laminectomy syndrome and not only options of pain management or therapy we will send her to physical therapy and she already has pain management. She is not a surgical candidate because of her age and postop status already.  She is also having more difficulty with pain in her legs, and a burning numbness at times, and she is going to get another epidural steroid injection in the near future with her pain management physician Dr. Chester Casanova. She is also having increasing tremors when she tries to write or hold things in her hands, right side greater than left, and increasing tremors inside her stomach that she seems preoccupied with. She is better on 25 mg of Mysoline, but the family is concerned because they seem to be getting a little worse again. Her tremors however on exam today do not seem that bad, so we will continue her current medication dose. She remains off the clonazepam which we had to work very hard to get her off of and now on the BuSpar. She had a past history of mini strokes. She just had a carotid Doppler study done March 2020 that showed mild disease only and no progression of disease. She is try to exercise more. She is also seen for increasing difficulty with back pain and has had many epidural steroid injection in the past, but they do not seem to be working as well now. .  Patient had a spinal cord stimulator inserted which is not helped all that much so far, may be a little, and so far she had recent radiofrequency ablation of the spinal nerve, and a medial branch denervation procedure or injection to help control her pain which has helped some. She is going to see her pain doctor Dr. Chester Casanova in another week. She has moved out of her daughter's house is now living alone, and seem to be doing okay. Her swallowing seems better after GI evaluation. Her PCP is monitoring her blood pressure, cholesterol, and she does not smoke, and she try to watch her diet. She takes aspirin daily. She is on blood pressure pills and cholesterol medications. She has chronic back pain, and is under pain management. We cut down her gabapentin to 300 mg twice a day, and she is doing a little better with that. We discussed her condition with the patient and her daughter at length. She continues to take the Aricept, does not think she needs any new medication.   She continues her vitamins and vitamin D on a regular basis. She also takes B12. .  She has had no more falls, and continues a regular exercise program.  That all seems to be somewhat better. She denies any headache, any increased neck pain, but does have chronic low back pain and post lumbar laminectomy syndrome and is followed by pain management physician with intermittent injections in her spine. This may be also contributing to her gait instability. She had a normal CT of the head 2 years ago because of her ataxia, and did physical therapy. She still drives and has not had an accident but only drives locally. She feels no problem doing it. She has also seen for increasing back pain and has spinal cord stimulator insertion for post lumbar laminectomy syndrome. She has weakness in her legs and difficulty walking, and she had an MRI scan of the lumbar spine apparently shows a disc and spinal stenosis. She doesn't seem to have much new weakness in the legs, and her bowel and bladder function is stable except for increasing difficulty emptying her bladder. She is seeing a urologist. She had previous back surgery and has a postlaminectomy syndrome, and now a new lumbar disc and spinal stenosis. Besides the weakness in her legs, she's had no other new weakness, sensory loss visual changes or headaches, or other new neurological problems except for the neurogenic bladder. She is using a cane or walker for ambulation because of her back pain. For her increasing back pain we offered her physical therapy but she would rather try her own exercises at home. She Has had no other new medical problems complications or illnesses except for a bout of diverticulitis. She is on Aricept 10 mg a day and is on clonazepam .5 mg each day. She no longer uses Ambien for sleep and takes vitamins regularly.  Her past medical history is pertinent for hypertension, depression, GERD, asthma, arthritis, post laminectomy syndrome, lumbar radiculopathy, a central tremor, dementia, ataxic gait, cataract surgery, cervical fusion, right carpal tunnel syndrome, post cervical laminectomy fusion. Hysterectomy and bilateral salpingo-oophorectomy, gallbladder surgery, right carpal tunnel surgery. Past Medical History:   Diagnosis Date    Arthritis     Asthma     Chronic pain     Dementia (HCC)     GERD (gastroesophageal reflux disease)     Hypertension     Vertigo       Past Surgical History:   Procedure Laterality Date    HX APPENDECTOMY      HX BACK SURGERY  05/21/2015    stimulator [employer]     HX CATARACT REMOVAL      bilateral    HX CERVICAL FUSION      anterior disc fusion    HX HEENT      uppp    HX LAP CHOLECYSTECTOMY      HX ORTHOPAEDIC      right carpal tunnel release    HX MATT AND BSO       Family History   Problem Relation Age of Onset    Stroke Mother     Cancer Father       Social History     Tobacco Use    Smoking status: Former Smoker    Smokeless tobacco: Never Used    Tobacco comment: stopped 25 plus years ago   Substance Use Topics    Alcohol use: No       Current Outpatient Medications   Medication Sig Dispense Refill    alendronate (FOSAMAX) 70 mg tablet TAKE 1 TABLET BY MOUTH ONCE WEEKLY ON AN EMPTY STOMACH WITH NOTHING ELSE      primidone (Mysoline) 50 mg tablet Take 0.5 Tabs by mouth nightly. 90 Tab 4    gabapentin (NEURONTIN) 300 mg capsule Take 1 Cap by mouth four (4) times daily. Max Daily Amount: 1,200 mg. 120 Cap 5    diclofenac (VOLTAREN) 1 % gel Apply 2 g to affected area four (4) times daily.  estradioL (Estrace) 0.01 % (0.1 mg/gram) vaginal cream Insert 2 g into vagina daily.  busPIRone (BUSPAR) 10 mg tablet TAKE 1 TABLET BY MOUTH THREE TIMES DAILY AFTER MEALS 100 Tab 5    cholecalciferol (Vitamin D3) 25 mcg (1,000 unit) cap Take  by mouth daily.       donepeziL (ARICEPT) 10 mg tablet TAKE 1 TABLET BY MOUTH DAILY 90 Tab 3    BREO ELLIPTA 100-25 mcg/dose inhaler INHALE 1 PUFF D      albuterol sulfate (PROAIR RESPICLICK) 90 mcg/actuation aepb ProAir RespiClick 90 mcg/actuation breath activated      traMADol (ULTRAM) 50 mg tablet Take 50 mg by mouth every six (6) hours as needed for Pain.  montelukast (SINGULAIR) 10 mg tablet TK 1 T PO D  3    CYANOCOBALAMIN, VITAMIN B-12, (VITAMIN B-12 PO) Take  by mouth daily.  aspirin delayed-release 81 mg tablet Take  by mouth daily.  colestipol (COLESTID) 1 gram tablet Take 1 g by mouth two (2) times a day.  GX-VV-PE-Fe-Min-Lycopen-Lutein (CENTRUM) 0.4-162-18 mg tab Take  by mouth.  traZODone (DESYREL) 50 mg tablet Take 50 mg by mouth nightly.  dicyclomine (BENTYL) 10 mg capsule Take 10 mg by mouth four (4) times daily.  losartan (COZAAR) 100 mg tablet Take 100 mg by mouth daily.  sertraline (ZOLOFT) 100 mg tablet Take 100 mg by mouth every morning.  omeprazole (PRILOSEC) 40 mg capsule Take 40 mg by mouth daily. Allergies   Allergen Reactions    Adhesive Tape Other (comments)     hurts the area it is placed on        Review of Systems:  A comprehensive review of systems was negative except for: Musculoskeletal: positive for myalgias, arthralgias, stiff joints and back pain  Neurological: positive for memory problems and tremor  Behvioral/Psych: positive for anxiety and depression     Objective:     I      NEUROLOGICAL EXAM:    Appearance: The patient is well developed, well nourished, provides a fair history and is in no acute distress. Mental Status: Oriented to time, place and person and the president, but misses the day of the month. Her speech is 100% intelligible, and she has mild cognitive impairment because of her dementia. She can remember only one of 3 words at 30 seconds with distraction, and cannot subtract serial sevens, or spell the word world backward, or draw a clock that shows a time 10 minutes to 11.  Patient is a little slow mentally for some recent memory loss but actually functioning fairly well in her ADLs. Mood and affect slightly anxious and depressed. Cranial Nerves:   Intact visual fields. Fundi are benign, discs are flat no lesions seen on funduscopy. . FRANK, EOM's full, no nystagmus, no ptosis. Facial sensation is normal. Corneal reflexes are not tested. Facial movement is symmetric. Hearing is abnormal bilaterally. Palate is midline with normal sternocleidomastoid and trapezius muscles are normal. Tongue is midline. Neck without meningismus or bruits  Temporal arteries are not tender or enlarged   Motor:  4/5 strength in upper and lower proximal and distal muscles. Normal bulk and tone. No fasciculations. Patient has negative straight leg raising test negative in the sitting position but has percussion tenderness over the lumbar spine  Patient had a spinal stimulator in the lumbar spine with stable surgical incisions  Rapid altering movement is slow in all extremities   Reflexes:   Deep tendon reflexes 1+/4 and symmetrical. No Babinski or clonus present   Sensory:   Normal to touch, pinprick and DSS, and temperature and vibration mildly decreased in both lower extremities. Gait:  Abnormal gait for age, as she does move slowly due to arthritis in her back, walk with a limp on her right leg, and normally uses a Rollator for ambulation. Tremor:   Mild intention tremor noted. Cerebellar:  Mldly abnormal Romberg and tandem cerebellar signs present  And finger-nose-finger examination shows no dysmetria. Neurovascular:  Normal heart sounds and regular rhythm, peripheral pulses decreased in both feet, and no carotid bruits.            Assessment:       Plan:     New problem of of wanting to taper off her Neurontin, and we told her it was possible but to watch for increased pain because she is already having more pain taking more injections from Dr. Dinesh Loaiza and they I talked about reactivating her spinal cord stimulator and we advised the patient to go down 1 pill every 2 to 3 weeks if tolerated on the gabapentin. Patient is to continue her pain management, and see Dr. Lisseth Flores next week, and hopefully get an epidural steroid injection if she needs it. She is not a surgical candidate, our only other option is physical therapy we sent her to physical therapy for gait training strengthening and therapy to her back and neck to help with her lumbar radiculopathy. Patient dementia seems stable, and she is already on Aricept for that, will continue that medication, neither family nor patient wants Namenda yet. Patient has essential tremor, that seems to be stable on 25 mg of Mysoline each night, we will continue that medication. Patient initially wanted the Mysoline increased because of her abdominal tremors, but these have plateaued and we will continue the BuSpar and leave the Mysoline 25 mg, to try to keep her sedation down, and she mentioned getting back on clonazepam twice a week cannot do that because it doubles her risk of death. If the tremor bothers her more she can see GI. Patient encouraged that she must keep exercising to try to maintain her strength and physical ability but she does not want any more physical therapy now. Because of increasing right leg radicular pain, we will increase her gabapentin to 4 times a day she is having no side effect on the current dose of medication, hopefully this will help relieve the pain and also help her slightly progressive tremors of essential type that she has in her hands. Patient still has some pain with radiculopathy into her arm some neck arthritis  She with new problem of increasing tremors, look more like benign essential tremor, and are interfering with her ability to function, so we will continue her on Mysoline 50 mg at nighttime advance her gabapentin as above.   Her back pain is increasing, despite numerous epidural injections and nerve ablations, and spinal cord stimulator, and advises not much else to do other than pain management and try some physical therapy but she would rather try exercise program on her own at home. Patient has had a carotid Doppler study that was okay March 2020 showed mild disease only. She is had no recurrent TIA symptoms. We discussed her condition with the patient and her daughter in detail. We encourage her to remain active and try to do her back exercises in addition and continue seeing her pain management physician  Patient is encouraged to continue her vitamins, vitamin D, remain mentally and physically active, and start exercising more  35 minutes spent with patient and the daughter, and advised her that she has a progressive degenerative brain condition that will only get worse with time. CT of the head and metabolic parameters were all normal 12 months ago  Patient is to continue her other medication and try to continue a regular exercise program  Patient is to see her pain management specialist, for continued treatment and spinal cord stimulator followup  Patient is to continue current medications and start exercising more   We discussed with her and her grand daughter in detail, if there is any doubt about her driving ability she should stop immediately or get tested by [facility]  Patient will be seen again in 6 months time or earlier as needed. She will call if any problem in the interim. Discuss her condition with patient and daughters at length,   Time of visit was 35 minutes today  She is to keep the lights on in the house at night and be careful when she moves or changes position quickly  She will call if any problem in the interim.     Signed By: Katelyn Stearns MD     June 21, 2021

## 2021-07-01 DIAGNOSIS — F02.80 ALZHEIMER'S TYPE DEMENTIA WITH LATE ONSET WITHOUT BEHAVIORAL DISTURBANCE (HCC): ICD-10-CM

## 2021-07-01 DIAGNOSIS — G30.1 ALZHEIMER'S TYPE DEMENTIA WITH LATE ONSET WITHOUT BEHAVIORAL DISTURBANCE (HCC): ICD-10-CM

## 2021-07-01 DIAGNOSIS — F02.80 DEMENTIA IN ALZHEIMER'S DISEASE WITH EARLY ONSET WITHOUT BEHAVIORAL DISTURBANCE (HCC): ICD-10-CM

## 2021-07-01 DIAGNOSIS — R26.0 ATAXIC GAIT: ICD-10-CM

## 2021-07-01 DIAGNOSIS — I65.23 STENOSIS OF BOTH INTERNAL CAROTID ARTERIES: ICD-10-CM

## 2021-07-01 DIAGNOSIS — G30.1 LATE ONSET ALZHEIMER'S DISEASE WITHOUT BEHAVIORAL DISTURBANCE (HCC): ICD-10-CM

## 2021-07-01 DIAGNOSIS — E03.4 HYPOTHYROIDISM DUE TO ACQUIRED ATROPHY OF THYROID: ICD-10-CM

## 2021-07-01 DIAGNOSIS — M79.7 FIBROMYALGIA: ICD-10-CM

## 2021-07-01 DIAGNOSIS — G30.0 DEMENTIA IN ALZHEIMER'S DISEASE WITH EARLY ONSET WITHOUT BEHAVIORAL DISTURBANCE (HCC): ICD-10-CM

## 2021-07-01 DIAGNOSIS — I67.89 CEREBRAL MICROVASCULAR DISEASE: ICD-10-CM

## 2021-07-01 DIAGNOSIS — F02.80 LATE ONSET ALZHEIMER'S DISEASE WITHOUT BEHAVIORAL DISTURBANCE (HCC): ICD-10-CM

## 2021-07-02 NOTE — TELEPHONE ENCOUNTER
Future Appointments   Date Time Provider Durga Medel   10/4/2021 11:00 AM Erna Salvador MD [facility]                         Last Appointment My Department:  6/21/2021    Please review and send in refill below if warranted.     Requested Prescriptions     Pending Prescriptions Disp Refills    busPIRone (BUSPAR) 10 mg tablet [Pharmacy Med Name: BUSPIRONE 10MG TABLETS] 100 Tablet 5     Sig: TAKE 1 TABLET BY MOUTH THREE TIMES DAILY AFTER MEALS

## 2021-07-06 RX ORDER — BUSPIRONE HYDROCHLORIDE 10 MG/1
TABLET ORAL
Qty: 100 TABLET | Refills: 5 | Status: SHIPPED | OUTPATIENT
Start: 2021-07-06 | End: 2021-07-12 | Stop reason: SDUPTHER

## 2021-07-07 ENCOUNTER — DOCUMENTATION ONLY (OUTPATIENT)
Dept: NEUROLOGY | Age: 86
End: 2021-07-07

## 2021-07-07 NOTE — PROGRESS NOTES
I received a fax approving the tier reduction for Gabapentin for the patient from [employer] 6/1/2021-12/31/2021

## 2021-07-12 DIAGNOSIS — F02.80 LATE ONSET ALZHEIMER'S DISEASE WITHOUT BEHAVIORAL DISTURBANCE (HCC): ICD-10-CM

## 2021-07-12 DIAGNOSIS — G30.1 ALZHEIMER'S TYPE DEMENTIA WITH LATE ONSET WITHOUT BEHAVIORAL DISTURBANCE (HCC): ICD-10-CM

## 2021-07-12 DIAGNOSIS — F02.80 ALZHEIMER'S TYPE DEMENTIA WITH LATE ONSET WITHOUT BEHAVIORAL DISTURBANCE (HCC): ICD-10-CM

## 2021-07-12 DIAGNOSIS — M79.7 FIBROMYALGIA: ICD-10-CM

## 2021-07-12 DIAGNOSIS — I65.23 STENOSIS OF BOTH INTERNAL CAROTID ARTERIES: ICD-10-CM

## 2021-07-12 DIAGNOSIS — E03.4 HYPOTHYROIDISM DUE TO ACQUIRED ATROPHY OF THYROID: ICD-10-CM

## 2021-07-12 DIAGNOSIS — R26.0 ATAXIC GAIT: ICD-10-CM

## 2021-07-12 DIAGNOSIS — G30.0 DEMENTIA IN ALZHEIMER'S DISEASE WITH EARLY ONSET WITHOUT BEHAVIORAL DISTURBANCE (HCC): ICD-10-CM

## 2021-07-12 DIAGNOSIS — F02.80 DEMENTIA IN ALZHEIMER'S DISEASE WITH EARLY ONSET WITHOUT BEHAVIORAL DISTURBANCE (HCC): ICD-10-CM

## 2021-07-12 DIAGNOSIS — I67.89 CEREBRAL MICROVASCULAR DISEASE: ICD-10-CM

## 2021-07-12 DIAGNOSIS — G30.1 LATE ONSET ALZHEIMER'S DISEASE WITHOUT BEHAVIORAL DISTURBANCE (HCC): ICD-10-CM

## 2021-07-12 RX ORDER — BUSPIRONE HYDROCHLORIDE 10 MG/1
TABLET ORAL
Qty: 100 TABLET | Refills: 5 | Status: SHIPPED | OUTPATIENT
Start: 2021-07-12 | End: 2022-02-12

## 2021-08-24 DIAGNOSIS — M79.7 FIBROMYALGIA: ICD-10-CM

## 2021-08-24 DIAGNOSIS — M48.061 SPINAL STENOSIS OF LUMBAR REGION WITHOUT NEUROGENIC CLAUDICATION: ICD-10-CM

## 2021-08-24 DIAGNOSIS — M96.1 LUMBAR POST-LAMINECTOMY SYNDROME: ICD-10-CM

## 2021-08-24 DIAGNOSIS — G25.0 BENIGN ESSENTIAL TREMOR SYNDROME: ICD-10-CM

## 2021-08-24 DIAGNOSIS — F02.80 DEMENTIA IN ALZHEIMER'S DISEASE WITH EARLY ONSET WITHOUT BEHAVIORAL DISTURBANCE (HCC): ICD-10-CM

## 2021-08-24 DIAGNOSIS — F02.80 LATE ONSET ALZHEIMER'S DISEASE WITHOUT BEHAVIORAL DISTURBANCE (HCC): ICD-10-CM

## 2021-08-24 DIAGNOSIS — I65.23 STENOSIS OF BOTH INTERNAL CAROTID ARTERIES: ICD-10-CM

## 2021-08-24 DIAGNOSIS — I67.89 CEREBRAL MICROVASCULAR DISEASE: ICD-10-CM

## 2021-08-24 DIAGNOSIS — G30.1 LATE ONSET ALZHEIMER'S DISEASE WITHOUT BEHAVIORAL DISTURBANCE (HCC): ICD-10-CM

## 2021-08-24 DIAGNOSIS — R26.0 ATAXIC GAIT: ICD-10-CM

## 2021-08-24 DIAGNOSIS — G30.0 DEMENTIA IN ALZHEIMER'S DISEASE WITH EARLY ONSET WITHOUT BEHAVIORAL DISTURBANCE (HCC): ICD-10-CM

## 2021-08-24 RX ORDER — DONEPEZIL HYDROCHLORIDE 10 MG/1
10 TABLET, FILM COATED ORAL DAILY
Qty: 90 TABLET | Refills: 3 | Status: SHIPPED | OUTPATIENT
Start: 2021-08-24 | End: 2022-05-28

## 2021-10-04 ENCOUNTER — OFFICE VISIT (OUTPATIENT)
Dept: NEUROLOGY | Age: 86
End: 2021-10-04
Payer: MEDICARE

## 2021-10-04 VITALS
OXYGEN SATURATION: 98 % | HEART RATE: 70 BPM | WEIGHT: 168 LBS | BODY MASS INDEX: 27.99 KG/M2 | DIASTOLIC BLOOD PRESSURE: 68 MMHG | RESPIRATION RATE: 16 BRPM | HEIGHT: 65 IN | SYSTOLIC BLOOD PRESSURE: 122 MMHG

## 2021-10-04 DIAGNOSIS — G60.9 IDIOPATHIC SMALL FIBER PERIPHERAL NEUROPATHY: ICD-10-CM

## 2021-10-04 DIAGNOSIS — I65.23 STENOSIS OF BOTH INTERNAL CAROTID ARTERIES: Primary | ICD-10-CM

## 2021-10-04 DIAGNOSIS — M96.1 LUMBAR POST-LAMINECTOMY SYNDROME: ICD-10-CM

## 2021-10-04 DIAGNOSIS — G30.1 LATE ONSET ALZHEIMER'S DISEASE WITHOUT BEHAVIORAL DISTURBANCE (HCC): ICD-10-CM

## 2021-10-04 DIAGNOSIS — F02.80 DEMENTIA IN ALZHEIMER'S DISEASE WITH EARLY ONSET WITHOUT BEHAVIORAL DISTURBANCE (HCC): ICD-10-CM

## 2021-10-04 DIAGNOSIS — M79.7 FIBROMYALGIA: ICD-10-CM

## 2021-10-04 DIAGNOSIS — I67.89 CEREBRAL MICROVASCULAR DISEASE: ICD-10-CM

## 2021-10-04 DIAGNOSIS — E11.42 DIABETIC PERIPHERAL NEUROPATHY ASSOCIATED WITH TYPE 2 DIABETES MELLITUS (HCC): ICD-10-CM

## 2021-10-04 DIAGNOSIS — M54.16 LUMBAR RADICULAR PAIN: ICD-10-CM

## 2021-10-04 DIAGNOSIS — E53.8 B12 DEFICIENCY: ICD-10-CM

## 2021-10-04 DIAGNOSIS — M48.061 SPINAL STENOSIS OF LUMBAR REGION WITHOUT NEUROGENIC CLAUDICATION: ICD-10-CM

## 2021-10-04 DIAGNOSIS — R26.0 ATAXIC GAIT: ICD-10-CM

## 2021-10-04 DIAGNOSIS — F02.80 LATE ONSET ALZHEIMER'S DISEASE WITHOUT BEHAVIORAL DISTURBANCE (HCC): ICD-10-CM

## 2021-10-04 DIAGNOSIS — G30.0 DEMENTIA IN ALZHEIMER'S DISEASE WITH EARLY ONSET WITHOUT BEHAVIORAL DISTURBANCE (HCC): ICD-10-CM

## 2021-10-04 DIAGNOSIS — G25.0 BENIGN ESSENTIAL TREMOR SYNDROME: ICD-10-CM

## 2021-10-04 PROCEDURE — 99214 OFFICE O/P EST MOD 30 MIN: CPT | Performed by: PSYCHIATRY & NEUROLOGY

## 2021-10-04 PROCEDURE — G8536 NO DOC ELDER MAL SCRN: HCPCS | Performed by: PSYCHIATRY & NEUROLOGY

## 2021-10-04 PROCEDURE — 1101F PT FALLS ASSESS-DOCD LE1/YR: CPT | Performed by: PSYCHIATRY & NEUROLOGY

## 2021-10-04 PROCEDURE — G8427 DOCREV CUR MEDS BY ELIG CLIN: HCPCS | Performed by: PSYCHIATRY & NEUROLOGY

## 2021-10-04 PROCEDURE — 1090F PRES/ABSN URINE INCON ASSESS: CPT | Performed by: PSYCHIATRY & NEUROLOGY

## 2021-10-04 PROCEDURE — G8419 CALC BMI OUT NRM PARAM NOF/U: HCPCS | Performed by: PSYCHIATRY & NEUROLOGY

## 2021-10-04 PROCEDURE — G9717 DOC PT DX DEP/BP F/U NT REQ: HCPCS | Performed by: PSYCHIATRY & NEUROLOGY

## 2021-10-04 RX ORDER — MEMANTINE HYDROCHLORIDE 10 MG/1
10 TABLET ORAL DAILY
Qty: 60 TABLET | Refills: 11 | Status: SHIPPED | OUTPATIENT
Start: 2021-10-04 | End: 2021-10-08 | Stop reason: ALTCHOICE

## 2021-10-04 RX ORDER — MEMANTINE HYDROCHLORIDE 5 MG/1
TABLET ORAL
Qty: 70 TABLET | Refills: 0 | Status: SHIPPED | OUTPATIENT
Start: 2021-10-04 | End: 2021-10-08 | Stop reason: ALTCHOICE

## 2021-10-04 RX ORDER — GABAPENTIN 300 MG/1
300 CAPSULE ORAL 3 TIMES DAILY
Qty: 120 CAPSULE | Refills: 5 | Status: SHIPPED | OUTPATIENT
Start: 2021-10-04 | End: 2022-03-22 | Stop reason: SDUPTHER

## 2021-10-04 NOTE — LETTER
10/4/2021    Patient: Virginia Lynch   YOB: 1934   Date of Visit: 10/4/2021     Migdalia Emery MD  [address] [address]  Via Fax: [phone]    Dear Migdalia Emery MD,      Thank you for referring Ms. Virginia Lynch to [address] [place] [address] for evaluation. My notes for this consultation are attached. Chief Complaint   Patient presents with    Dementia     1 year f/u     Pt states she is still having shakes on the inside, like she has previously stated. 1. Have you been to the ER, urgent care clinic since your last visit? Hospitalized since your last visit? No    2. Have you seen or consulted any other health care providers outside of the [address] [place] [address] since your last visit? Include any pap smears or colon screening. Yes When: PCP Dr. Houston Luther  /68 (BP 1 Location: Left arm, BP Patient Position: Sitting)   Pulse 70   Resp 16   Ht 5' 5\" (1.651 m)   Wt 168 lb (76.2 kg)   SpO2 98%   BMI 27.96 kg/m²         Consult    Subjective:     Virginia Lynch is a 80 y. o.right-handed  female seen today at the request of Dr. Carmenza Masters for urgent work in evaluation of new problem of patient having increasing difficulty with her memory, forgetting what people tell her, forgetting dates, so far not forgetting her medication supposedly, and since she is already on Aricept 10 mg a day they're questioning what else can be done for her memory loss. We discussed the new drug that was approved for Alzheimer's disease that was very controversial,  Aduhelm and after the risk and benefits explained to the patient and her daughter they decided that she was not a candidate for that medication. We did discuss Namenda and they're willing to try that so we gave them a starter kit after explained the risk and benefits of medication then gave her maintenance dose of 10 mg twice a day to resume after the first month on the starter kit.   They were advised of the side effect being dizziness, increased confusion, diarrhea or constipation, but in general with Aricept in the trial the side effects were similar to placebo. 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, but has never been diagnosed with that so metabolic panels were sent off today to see if she has a cause for her sensory neuropathy. She may need EMG studies if it persists. She is taking the Neurontin 3 mg 3 times a day which should help with neuropathy pain but she is taking it also for her back pain which is severe and she is followed by pain management physician with repeat injections and other therapies. She also has bad arthritis in her knees and gets injections there to. They are talking about activating her spinal cord stimulator again also. She is in pain management and wants to know what else can be done, and we advised her at her age, surgery is contraindicated, and all we have his pain management really. Still has a lot of family stress and illnesses amongst relatives, and a lot of family stress. She uses a Rollator to ambulate because of her slightly unsteady gait particularly out of the house. She has had no more falls recently using the Rollator. She denies any other recent injuries or falls, and denies any major headache, or any difficulty with her arms or cranial nerves. Most likely her problem is due to her spinal stenosis and post lumbar laminectomy syndrome and not only options of pain management or therapy we will send her to physical therapy and she already has pain management. She is not a surgical candidate because of her age and postop status already.  She is also having more difficulty with pain in her legs, and a burning numbness at times, and she is going to get another epidural steroid injection in the near future with her pain management physician Dr. Anne Mcintyre. She is also having increasing tremors when she tries to write or hold things in her hands, right side greater than left, and increasing tremors inside her stomach that she seems preoccupied with. She is better on 25 mg of Mysoline, but the family is concerned because they seem to be getting a little worse again. Her tremors however on exam today do not seem that bad, so we will continue her current medication dose. We could increase the Mysoline in the future, but I don't want to add to new medications at this time to keep the side effects to a minimum. She remains off the clonazepam which we had to work very hard to get her off of and now on the BuSpar. She had a past history of mini strokes. She just had a carotid Doppler study done March 2020 that showed mild disease only and no progression of disease. She is try to exercise more. Patient had a spinal cord stimulator inserted which is not helped all that much so far, may be a little, and so far she had recent radiofrequency ablation of the spinal nerve, and a medial branch denervation procedure or injection to help control her pain which has helped some. She is going to see her pain doctor Dr. Henrique Spence in another week. She has moved out of her daughter's house is now living alone, and seem to be doing okay. Her swallowing seems better after GI evaluation. Her PCP is monitoring her blood pressure, cholesterol, and she does not smoke, and she try to watch her diet. She takes aspirin daily. She is on blood pressure pills and cholesterol medications. She has chronic back pain, and is under pain management. We cut down her gabapentin to 300 mg twice a day, and she is doing a little better with that. We discussed her condition with the patient and her daughter at length. She continues to take the Aricept, does not think she needs any new medication. She continues her vitamins and vitamin D on a regular basis. She also takes B12. .  She has had no more falls, and continues a regular exercise program.  That all seems to be somewhat better. She denies any headache, any increased neck pain, but does have chronic low back pain and post lumbar laminectomy syndrome and is followed by pain management physician with intermittent injections in her spine. This may be also contributing to her gait instability. She had a normal CT of the head 2 years ago because of her ataxia, and did physical therapy. She still drives and has not had an accident but only drives locally. She feels no problem doing it. She has also seen for increasing back pain and has spinal cord stimulator insertion for post lumbar laminectomy syndrome. She has weakness in her legs and difficulty walking, and she had an MRI scan of the lumbar spine apparently shows a disc and spinal stenosis. She doesn't seem to have much new weakness in the legs, and her bowel and bladder function is stable except for increasing difficulty emptying her bladder. She is seeing a urologist. She had previous back surgery and has a postlaminectomy syndrome, and now a new lumbar disc and spinal stenosis. Besides the weakness in her legs, she's had no other new weakness, sensory loss visual changes or headaches, or other new neurological problems except for the neurogenic bladder. She is using a cane or walker for ambulation because of her back pain. For her increasing back pain we offered her physical therapy but she would rather try her own exercises at home. Her past medical history is pertinent for hypertension, depression, GERD, asthma, arthritis, post laminectomy syndrome, lumbar radiculopathy, a central tremor, dementia, ataxic gait, cataract surgery, cervical fusion, right carpal tunnel syndrome, post cervical laminectomy fusion. Hysterectomy and bilateral salpingo-oophorectomy, gallbladder surgery, right carpal tunnel surgery.     Past Medical History:   Diagnosis Date    Arthritis     Asthma     Chronic pain     Dementia ([address]     GERD (gastroesophageal reflux disease)     Hypertension     Vertigo       Past Surgical History:   Procedure Laterality Date    HX APPENDECTOMY      HX BACK SURGERY  05/21/2015    stimulator [employer]     HX CATARACT REMOVAL      bilateral    HX CERVICAL FUSION      anterior disc fusion    HX HEENT      uppp    HX LAP CHOLECYSTECTOMY      HX ORTHOPAEDIC      right carpal tunnel release    HX MATT AND BSO       Family History   Problem Relation Age of Onset    Stroke Mother     Cancer Father       Social History     Tobacco Use    Smoking status: Former Smoker    Smokeless tobacco: Never Used    Tobacco comment: stopped 25 plus years ago   Substance Use Topics    Alcohol use: No       Current Outpatient Medications   Medication Sig Dispense Refill    memantine (NAMENDA) 5 mg tablet Week 1: take 1 daily, Week 2: take 1 AM and 1 PM; Week 3: take 1 AM and 2 PM; Week 4: take 2 AM and 2 PM 70 Tablet 0    memantine (Namenda) 10 mg tablet Take 1 Tablet by mouth daily. 60 Tablet 11    gabapentin (NEURONTIN) 300 mg capsule Take 1 Capsule by mouth three (3) times daily. Max Daily Amount: 900 mg. 120 Capsule 5    donepeziL (ARICEPT) 10 mg tablet Take 1 Tablet by mouth daily. 90 Tablet 3    busPIRone (BUSPAR) 10 mg tablet TAKE 1 TABLET BY MOUTH THREE TIMES A  Tablet 5    alendronate (FOSAMAX) 70 mg tablet TAKE 1 TABLET BY MOUTH ONCE WEEKLY ON AN EMPTY STOMACH WITH NOTHING ELSE      primidone (Mysoline) 50 mg tablet Take 0.5 Tabs by mouth nightly. 90 Tab 4    diclofenac (VOLTAREN) 1 % gel Apply 2 g to affected area four (4) times daily.  estradioL (Estrace) 0.01 % (0.1 mg/gram) vaginal cream Insert 2 g into vagina daily.  cholecalciferol (Vitamin D3) 25 mcg (1,000 unit) cap Take  by mouth daily.       BREO ELLIPTA 100-25 mcg/dose inhaler INHALE 1 PUFF D      albuterol sulfate (PROAIR RESPICLICK) 90 mcg/actuation aepb ProAir RespiClick 90 mcg/actuation breath activated  traMADol (ULTRAM) 50 mg tablet Take 50 mg by mouth every six (6) hours as needed for Pain.  montelukast (SINGULAIR) 10 mg tablet TK 1 T PO D  3    CYANOCOBALAMIN, VITAMIN B-12, (VITAMIN B-12 PO) Take  by mouth daily.  aspirin delayed-release 81 mg tablet Take  by mouth daily.  colestipol (COLESTID) 1 gram tablet Take 1 g by mouth two (2) times a day.  traZODone (DESYREL) 50 mg tablet Take 50 mg by mouth nightly.  dicyclomine (BENTYL) 10 mg capsule Take 10 mg by mouth four (4) times daily.  losartan (COZAAR) 100 mg tablet Take 100 mg by mouth daily.  sertraline (ZOLOFT) 100 mg tablet Take 100 mg by mouth every morning.  omeprazole (PRILOSEC) 40 mg capsule Take 40 mg by mouth daily. Allergies   Allergen Reactions    Adhesive Tape Other (comments)     hurts the area it is placed on        Review of Systems:  A comprehensive review of systems was negative except for: Musculoskeletal: positive for myalgias, arthralgias, stiff joints and back pain  Neurological: positive for memory problems and tremor  Behvioral/Psych: positive for anxiety and depression     Objective:     I      NEUROLOGICAL EXAM:    Appearance: The patient is well developed, well nourished, provides a fair history and is in no acute distress. Mental Status: Oriented to time, place and person and the president, but misses the day of the month. Her speech is 100% intelligible, and she has mild cognitive impairment because of her dementia. She can remember only one of 3 words at 30 seconds with distraction, and cannot subtract serial sevens, or spell the word world backward, or draw a clock that shows a time 10 minutes to 11. Patient is a little slow mentally for some recent memory loss but actually functioning fairly well in her ADLs. Mood and affect slightly anxious and depressed. Cranial Nerves:   Intact visual fields. Fundi are benign, discs are flat no lesions seen on funduscopy. [place] [facility], EOM's full, no nystagmus, no ptosis. Facial sensation is normal. Corneal reflexes are not tested. Facial movement is symmetric. Hearing is abnormal bilaterally. Palate is midline with normal sternocleidomastoid and trapezius muscles are normal. Tongue is midline. Neck without meningismus or bruits  Temporal arteries are not tender or enlarged   Motor:  4/5 strength in upper and lower proximal and distal muscles. Normal bulk and tone. No fasciculations. Patient has negative straight leg raising test negative in the sitting position but has percussion tenderness over the lumbar spine  Patient had a spinal stimulator in the lumbar spine with stable surgical incisions  Rapid altering movement is slow in all extremities   Reflexes:   Deep tendon reflexes 1+/4 and symmetrical. No Babinski or clonus present   Sensory:   Normal to touch, pinprick and DSS, and temperature and vibration mildly decreased in both lower extremities. Gait:  Abnormal gait for age, as she does move slowly due to arthritis in her back, walk with a limp on her right leg, and normally uses a Rollator for ambulation. Tremor:   Mild intention tremor noted. Cerebellar:  Mldly abnormal Romberg and tandem cerebellar signs present  And finger-nose-finger examination shows no dysmetria. Neurovascular:  Normal heart sounds and regular rhythm, peripheral pulses decreased in both feet, and no carotid bruits. Assessment:       Plan:     Patient with new problem of increasing memory loss, we will add on Namenda and a starter kit was given to her and maintenance dose of 10 mg twice a day after the 1 month on the starter kit, she was advised of the risk and benefits of the medication all as defined above. Potential sometimes are worse, but I don't want to increase the Mysoline change to medication at one time, we could consider that in the near future.   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 She will continue the gabapentin both for her back pain and her neuropathy. Dr. Yris Cordova talked about reactivating her spinal cord stimulator   Patient is to continue her pain management, and see Dr. Yris Cordova next week, and hopefully get an epidural steroid injection if she needs it. She is not a surgical candidate, our only other option is physical therapy we sent her to physical therapy for gait training strengthening and therapy to her back and neck to help with her lumbar radiculopathy. Patient has essential tremor, that seems to be stable on 25 mg of Mysoline each night, we will continue that medication. Patient initially wanted the Mysoline increased because of her abdominal tremors, but these have plateaued and we will continue the BuSpar and leave the Mysoline 25 mg, to try to keep her sedation down, and she mentioned getting back on clonazepam twice a week cannot do that because it doubles her risk of death. If the tremor bothers her more she can see GI. Patient encouraged that she must keep exercising to try to maintain her strength and physical ability but she does not want any more physical therapy now. Because of increasing right leg radicular pain, we will increase her gabapentin to 4 times a day she is having no side effect on the current dose of medication, hopefully this will help relieve the pain and also help her slightly progressive tremors of essential type that she has in her hands. Patient still has some pain with radiculopathy into her arm some neck arthritis  She with new problem of increasing tremors, look more like benign essential tremor, and are interfering with her ability to function, so we will continue her on Mysoline 25 mg at nighttime advance her gabapentin as above.   Her back pain is increasing, despite numerous epidural injections and nerve ablations, and spinal cord stimulator, and advises not much else to do other than pain management and try some physical therapy but she would rather try exercise program on her own at home. Patient has had a carotid Doppler study that was okay March 2020 showed mild disease only. She is had no recurrent TIA symptoms. We discussed her condition with the patient and her daughter in detail. We encourage her to remain active and try to do her back exercises in addition and continue seeing her pain management physician  Patient is encouraged to continue her vitamins, vitamin D, remain mentally and physically active, and start exercising more  39 minutes spent with patient and the daughter, and advised her that she has a progressive degenerative brain condition that will only get worse with time. CT of the head and metabolic parameters were all normal 12 months ago  Patient is to continue her other medication and try to continue a regular exercise program  Patient is to see her pain management specialist, for continued treatment and spinal cord stimulator followup  Patient is to continue current medications and start exercising more   We discussed with her and her grand daughter in detail, if there is any doubt about her driving ability she should stop immediately or get tested by [facility]  Patient will be seen again in 6 months time or earlier as needed. She will call if any problem in the interim. Discuss her condition with patient and daughters at length,   Time of visit was 39 minutes today  She is to keep the lights on in the house at night and be careful when she moves or changes position quickly  She will call if any problem in the interim. Signed By: Irby Dancer, MD     October 4, 2021                   If you have questions, please do not hesitate to call me. I look forward to following your patient along with you.       Sincerely,    Irby Dancer, MD

## 2021-10-04 NOTE — PROGRESS NOTES
Chief Complaint   Patient presents with    Dementia     1 year f/u     Pt states she is still having shakes on the inside, like she has previously stated. 1. Have you been to the ER, urgent care clinic since your last visit? Hospitalized since your last visit? No    2. Have you seen or consulted any other health care providers outside of the [address] [place] [address] since your last visit? Include any pap smears or colon screening.  Yes When: PCP Dr. Marinelli Early  /68 (BP 1 Location: Left arm, BP Patient Position: Sitting)   Pulse 70   Resp 16   Ht 5' 5\" (1.651 m)   Wt 168 lb (76.2 kg)   SpO2 98%   BMI 27.96 kg/m²

## 2021-10-05 ENCOUNTER — TELEPHONE (OUTPATIENT)
Dept: NEUROLOGY | Age: 86
End: 2021-10-05

## 2021-10-05 NOTE — TELEPHONE ENCOUNTER
Patients daughter Theo Null called in regards to the patients last office visit, London Maier would like a call in regards to this, patient is unable to tell her about the visit, I checked London Maier is on a phi form from 2019, patient couldn't find avs from that visit so im mailing that along with a phi form for her to update.   Germaine Ham can be reached at [phone]

## 2021-10-05 NOTE — PROGRESS NOTES
Consult    Subjective:     Sharyle Cong is a 80 y. o.right-handed  female seen today at the request of Dr. Henok Vazquez for urgent work in evaluation of new problem of patient having increasing difficulty with her memory, forgetting what people tell her, forgetting dates, so far not forgetting her medication supposedly, and since she is already on Aricept 10 mg a day they're questioning what else can be done for her memory loss. We discussed the new drug that was approved for Alzheimer's disease that was very controversial,  Aduhelm and after the risk and benefits explained to the patient and her daughter they decided that she was not a candidate for that medication. We did discuss Namenda and they're willing to try that so we gave them a starter kit after explained the risk and benefits of medication then gave her maintenance dose of 10 mg twice a day to resume after the first month on the starter kit. They were advised of the side effect being dizziness, increased confusion, diarrhea or constipation, but in general with Aricept in the trial the side effects were similar to placebo. 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, but has never been diagnosed with that so metabolic panels were sent off today to see if she has a cause for her sensory neuropathy. She may need EMG studies if it persists. She is taking the Neurontin 3 mg 3 times a day which should help with neuropathy pain but she is taking it also for her back pain which is severe and she is followed by pain management physician with repeat injections and other therapies. She also has bad arthritis in her knees and gets injections there to. They are talking about activating her spinal cord stimulator again also.   She is in pain management and wants to know what else can be done, and we advised her at her age, surgery is contraindicated, and all we have his pain management really. Still has a lot of family stress and illnesses amongst relatives, and a lot of family stress. She uses a Rollator to ambulate because of her slightly unsteady gait particularly out of the house. She has had no more falls recently using the Rollator. She denies any other recent injuries or falls, and denies any major headache, or any difficulty with her arms or cranial nerves. Most likely her problem is due to her spinal stenosis and post lumbar laminectomy syndrome and not only options of pain management or therapy we will send her to physical therapy and she already has pain management. She is not a surgical candidate because of her age and postop status already. She is also having more difficulty with pain in her legs, and a burning numbness at times, and she is going to get another epidural steroid injection in the near future with her pain management physician Dr. Katerine Espana. She is also having increasing tremors when she tries to write or hold things in her hands, right side greater than left, and increasing tremors inside her stomach that she seems preoccupied with. She is better on 25 mg of Mysoline, but the family is concerned because they seem to be getting a little worse again. Her tremors however on exam today do not seem that bad, so we will continue her current medication dose. We could increase the Mysoline in the future, but I don't want to add to new medications at this time to keep the side effects to a minimum. She remains off the clonazepam which we had to work very hard to get her off of and now on the BuSpar. She had a past history of mini strokes. She just had a carotid Doppler study done March 2020 that showed mild disease only and no progression of disease. She is try to exercise more.     Patient had a spinal cord stimulator inserted which is not helped all that much so far, may be a little, and so far she had recent radiofrequency ablation of the spinal nerve, and a medial branch denervation procedure or injection to help control her pain which has helped some. She is going to see her pain doctor Dr. Daylin Hurley in another week. She has moved out of her daughter's house is now living alone, and seem to be doing okay. Her swallowing seems better after GI evaluation. Her PCP is monitoring her blood pressure, cholesterol, and she does not smoke, and she try to watch her diet. She takes aspirin daily. She is on blood pressure pills and cholesterol medications. She has chronic back pain, and is under pain management. We cut down her gabapentin to 300 mg twice a day, and she is doing a little better with that. We discussed her condition with the patient and her daughter at length. She continues to take the Aricept, does not think she needs any new medication. She continues her vitamins and vitamin D on a regular basis. She also takes B12. .  She has had no more falls, and continues a regular exercise program.  That all seems to be somewhat better. She denies any headache, any increased neck pain, but does have chronic low back pain and post lumbar laminectomy syndrome and is followed by pain management physician with intermittent injections in her spine. This may be also contributing to her gait instability. She had a normal CT of the head 2 years ago because of her ataxia, and did physical therapy. She still drives and has not had an accident but only drives locally. She feels no problem doing it. She has also seen for increasing back pain and has spinal cord stimulator insertion for post lumbar laminectomy syndrome. She has weakness in her legs and difficulty walking, and she had an MRI scan of the lumbar spine apparently shows a disc and spinal stenosis. She doesn't seem to have much new weakness in the legs, and her bowel and bladder function is stable except for increasing difficulty emptying her bladder.  She is seeing a urologist. She had previous back surgery and has a postlaminectomy syndrome, and now a new lumbar disc and spinal stenosis. Besides the weakness in her legs, she's had no other new weakness, sensory loss visual changes or headaches, or other new neurological problems except for the neurogenic bladder. She is using a cane or walker for ambulation because of her back pain. For her increasing back pain we offered her physical therapy but she would rather try her own exercises at home. Her past medical history is pertinent for hypertension, depression, GERD, asthma, arthritis, post laminectomy syndrome, lumbar radiculopathy, a central tremor, dementia, ataxic gait, cataract surgery, cervical fusion, right carpal tunnel syndrome, post cervical laminectomy fusion. Hysterectomy and bilateral salpingo-oophorectomy, gallbladder surgery, right carpal tunnel surgery. Past Medical History:   Diagnosis Date    Arthritis     Asthma     Chronic pain     Dementia (HCC)     GERD (gastroesophageal reflux disease)     Hypertension     Vertigo       Past Surgical History:   Procedure Laterality Date    HX APPENDECTOMY      HX BACK SURGERY  05/21/2015    stimulator [employer]     HX CATARACT REMOVAL      bilateral    HX CERVICAL FUSION      anterior disc fusion    HX HEENT      uppp    HX LAP CHOLECYSTECTOMY      HX ORTHOPAEDIC      right carpal tunnel release    HX MATT AND BSO       Family History   Problem Relation Age of Onset    Stroke Mother     Cancer Father       Social History     Tobacco Use    Smoking status: Former Smoker    Smokeless tobacco: Never Used    Tobacco comment: stopped 25 plus years ago   Substance Use Topics    Alcohol use: No       Current Outpatient Medications   Medication Sig Dispense Refill    memantine (NAMENDA) 5 mg tablet Week 1: take 1 daily, Week 2: take 1 AM and 1 PM; Week 3: take 1 AM and 2 PM; Week 4: take 2 AM and 2 PM 70 Tablet 0    memantine (Namenda) 10 mg tablet Take 1 Tablet by mouth daily.  60 Tablet 11  gabapentin (NEURONTIN) 300 mg capsule Take 1 Capsule by mouth three (3) times daily. Max Daily Amount: 900 mg. 120 Capsule 5    donepeziL (ARICEPT) 10 mg tablet Take 1 Tablet by mouth daily. 90 Tablet 3    busPIRone (BUSPAR) 10 mg tablet TAKE 1 TABLET BY MOUTH THREE TIMES A  Tablet 5    alendronate (FOSAMAX) 70 mg tablet TAKE 1 TABLET BY MOUTH ONCE WEEKLY ON AN EMPTY STOMACH WITH NOTHING ELSE      primidone (Mysoline) 50 mg tablet Take 0.5 Tabs by mouth nightly. 90 Tab 4    diclofenac (VOLTAREN) 1 % gel Apply 2 g to affected area four (4) times daily.  estradioL (Estrace) 0.01 % (0.1 mg/gram) vaginal cream Insert 2 g into vagina daily.  cholecalciferol (Vitamin D3) 25 mcg (1,000 unit) cap Take  by mouth daily.  BREO ELLIPTA 100-25 mcg/dose inhaler INHALE 1 PUFF D      albuterol sulfate (PROAIR RESPICLICK) 90 mcg/actuation aepb ProAir RespiClick 90 mcg/actuation breath activated      traMADol (ULTRAM) 50 mg tablet Take 50 mg by mouth every six (6) hours as needed for Pain.  montelukast (SINGULAIR) 10 mg tablet TK 1 T PO D  3    CYANOCOBALAMIN, VITAMIN B-12, (VITAMIN B-12 PO) Take  by mouth daily.  aspirin delayed-release 81 mg tablet Take  by mouth daily.  colestipol (COLESTID) 1 gram tablet Take 1 g by mouth two (2) times a day.  traZODone (DESYREL) 50 mg tablet Take 50 mg by mouth nightly.  dicyclomine (BENTYL) 10 mg capsule Take 10 mg by mouth four (4) times daily.  losartan (COZAAR) 100 mg tablet Take 100 mg by mouth daily.  sertraline (ZOLOFT) 100 mg tablet Take 100 mg by mouth every morning.  omeprazole (PRILOSEC) 40 mg capsule Take 40 mg by mouth daily.           Allergies   Allergen Reactions    Adhesive Tape Other (comments)     hurts the area it is placed on        Review of Systems:  A comprehensive review of systems was negative except for: Musculoskeletal: positive for myalgias, arthralgias, stiff joints and back pain  Neurological: positive for memory problems and tremor  Behvioral/Psych: positive for anxiety and depression     Objective:     I      NEUROLOGICAL EXAM:    Appearance: The patient is well developed, well nourished, provides a fair history and is in no acute distress. Mental Status: Oriented to time, place and person and the president, but misses the day of the month. Her speech is 100% intelligible, and she has mild cognitive impairment because of her dementia. She can remember only one of 3 words at 30 seconds with distraction, and cannot subtract serial sevens, or spell the word world backward, or draw a clock that shows a time 10 minutes to 11. Patient is a little slow mentally for some recent memory loss but actually functioning fairly well in her ADLs. Mood and affect slightly anxious and depressed. Cranial Nerves:   Intact visual fields. Fundi are benign, discs are flat no lesions seen on funduscopy. . FRANK, EOM's full, no nystagmus, no ptosis. Facial sensation is normal. Corneal reflexes are not tested. Facial movement is symmetric. Hearing is abnormal bilaterally. Palate is midline with normal sternocleidomastoid and trapezius muscles are normal. Tongue is midline. Neck without meningismus or bruits  Temporal arteries are not tender or enlarged   Motor:  4/5 strength in upper and lower proximal and distal muscles. Normal bulk and tone. No fasciculations. Patient has negative straight leg raising test negative in the sitting position but has percussion tenderness over the lumbar spine  Patient had a spinal stimulator in the lumbar spine with stable surgical incisions  Rapid altering movement is slow in all extremities   Reflexes:   Deep tendon reflexes 1+/4 and symmetrical. No Babinski or clonus present   Sensory:   Normal to touch, pinprick and DSS, and temperature and vibration mildly decreased in both lower extremities.    Gait:  Abnormal gait for age, as she does move slowly due to arthritis in her back, walk with a limp on her right leg, and normally uses a Rollator for ambulation. Tremor:   Mild intention tremor noted. Cerebellar:  Mldly abnormal Romberg and tandem cerebellar signs present  And finger-nose-finger examination shows no dysmetria. Neurovascular:  Normal heart sounds and regular rhythm, peripheral pulses decreased in both feet, and no carotid bruits. Assessment:       Plan:     Patient with new problem of increasing memory loss, we will add on Namenda and a starter kit was given to her and maintenance dose of 10 mg twice a day after the 1 month on the starter kit, she was advised of the risk and benefits of the medication all as defined above. Potential sometimes are worse, but I don't want to increase the Mysoline change to medication at one time, we could consider that in the near future. 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 She will continue the gabapentin both for her back pain and her neuropathy. Dr. Danny Roberson talked about reactivating her spinal cord stimulator   Patient is to continue her pain management, and see Dr. Danny Roberson next week, and hopefully get an epidural steroid injection if she needs it. She is not a surgical candidate, our only other option is physical therapy we sent her to physical therapy for gait training strengthening and therapy to her back and neck to help with her lumbar radiculopathy. Patient has essential tremor, that seems to be stable on 25 mg of Mysoline each night, we will continue that medication. Patient initially wanted the Mysoline increased because of her abdominal tremors, but these have plateaued and we will continue the BuSpar and leave the Mysoline 25 mg, to try to keep her sedation down, and she mentioned getting back on clonazepam twice a week cannot do that because it doubles her risk of death.   If the tremor bothers her more she can see GI. Patient encouraged that she must keep exercising to try to maintain her strength and physical ability but she does not want any more physical therapy now. Because of increasing right leg radicular pain, we will increase her gabapentin to 4 times a day she is having no side effect on the current dose of medication, hopefully this will help relieve the pain and also help her slightly progressive tremors of essential type that she has in her hands. Patient still has some pain with radiculopathy into her arm some neck arthritis  She with new problem of increasing tremors, look more like benign essential tremor, and are interfering with her ability to function, so we will continue her on Mysoline 25 mg at nighttime advance her gabapentin as above. Her back pain is increasing, despite numerous epidural injections and nerve ablations, and spinal cord stimulator, and advises not much else to do other than pain management and try some physical therapy but she would rather try exercise program on her own at home. Patient has had a carotid Doppler study that was okay March 2020 showed mild disease only. She is had no recurrent TIA symptoms. We discussed her condition with the patient and her daughter in detail. We encourage her to remain active and try to do her back exercises in addition and continue seeing her pain management physician  Patient is encouraged to continue her vitamins, vitamin D, remain mentally and physically active, and start exercising more  39 minutes spent with patient and the daughter, and advised her that she has a progressive degenerative brain condition that will only get worse with time.   CT of the head and metabolic parameters were all normal 12 months ago  Patient is to continue her other medication and try to continue a regular exercise program  Patient is to see her pain management specialist, for continued treatment and spinal cord stimulator followup  Patient is to continue current medications and start exercising more   We discussed with her and her grand daughter in detail, if there is any doubt about her driving ability she should stop immediately or get tested by [facility]  Patient will be seen again in 6 months time or earlier as needed. She will call if any problem in the interim. Discuss her condition with patient and daughters at length,   Time of visit was 39 minutes today  She is to keep the lights on in the house at night and be careful when she moves or changes position quickly  She will call if any problem in the interim.     Signed By: Cecilia Espinal MD     October 4, 2021

## 2021-10-08 ENCOUNTER — TELEPHONE (OUTPATIENT)
Dept: NEUROLOGY | Age: 86
End: 2021-10-08

## 2021-10-08 DIAGNOSIS — G30.1 LATE ONSET ALZHEIMER'S DISEASE WITHOUT BEHAVIORAL DISTURBANCE (HCC): Primary | ICD-10-CM

## 2021-10-08 DIAGNOSIS — F02.80 LATE ONSET ALZHEIMER'S DISEASE WITHOUT BEHAVIORAL DISTURBANCE (HCC): Primary | ICD-10-CM

## 2021-10-08 RX ORDER — MEMANTINE HYDROCHLORIDE 10 MG/1
10 TABLET ORAL DAILY
Qty: 180 TABLET | Refills: 4 | Status: SHIPPED | OUTPATIENT
Start: 2021-10-08 | End: 2021-11-02 | Stop reason: SDUPTHER

## 2021-10-11 ENCOUNTER — DOCUMENTATION ONLY (OUTPATIENT)
Dept: NEUROLOGY | Age: 86
End: 2021-10-11

## 2021-10-11 DIAGNOSIS — G60.9 IDIOPATHIC SMALL FIBER PERIPHERAL NEUROPATHY: Primary | ICD-10-CM

## 2021-10-11 NOTE — PROGRESS NOTES
I called the patient, advised her that recommend a urine protein analysis, so I put an order for 24-hour urine immunoelectrophoresis as they recommended, and I called the patient and she will  the lab slip tomorrow and get the test done.     Pennie Elizabeth she will pick this test up tomorrow

## 2021-10-12 ENCOUNTER — TELEPHONE (OUTPATIENT)
Dept: NEUROLOGY | Age: 86
End: 2021-10-12

## 2021-10-12 NOTE — TELEPHONE ENCOUNTER
Patient came into the office today with her daughter that I mentioned in a previous message, they are updating her phi form as I type this, the previous message has a number in it but its one number off so that's being corrected, patient will leave today with her AVS from her last office visit as well as the lab order Dr. Maykel Ssoa put in on 10-11, patient's daughter is concerned why her mother would need a protein analysis and also still wanting to discuss the last office visit, Fred Jorgensen- Sylvia Good would like to speak with dr Obed Isabel preferably

## 2021-10-13 NOTE — TELEPHONE ENCOUNTER
I called the daughter, explained this was a neuropathy work-up, and there is no rush is just a diagnostic test for her neuropathy and nothing to do with her memory Female

## 2021-10-23 DIAGNOSIS — I65.23 STENOSIS OF BOTH INTERNAL CAROTID ARTERIES: ICD-10-CM

## 2021-10-23 DIAGNOSIS — M48.061 SPINAL STENOSIS OF LUMBAR REGION WITHOUT NEUROGENIC CLAUDICATION: ICD-10-CM

## 2021-10-23 DIAGNOSIS — R26.0 ATAXIC GAIT: ICD-10-CM

## 2021-10-23 DIAGNOSIS — F02.80 LATE ONSET ALZHEIMER'S DISEASE WITHOUT BEHAVIORAL DISTURBANCE (HCC): ICD-10-CM

## 2021-10-23 DIAGNOSIS — M79.7 FIBROMYALGIA: ICD-10-CM

## 2021-10-23 DIAGNOSIS — G25.0 BENIGN ESSENTIAL TREMOR SYNDROME: ICD-10-CM

## 2021-10-23 DIAGNOSIS — I67.89 CEREBRAL MICROVASCULAR DISEASE: ICD-10-CM

## 2021-10-23 DIAGNOSIS — M96.1 LUMBAR POST-LAMINECTOMY SYNDROME: ICD-10-CM

## 2021-10-23 DIAGNOSIS — F02.80 ALZHEIMER'S TYPE DEMENTIA WITH LATE ONSET WITHOUT BEHAVIORAL DISTURBANCE (HCC): ICD-10-CM

## 2021-10-23 DIAGNOSIS — G30.0 DEMENTIA IN ALZHEIMER'S DISEASE WITH EARLY ONSET WITHOUT BEHAVIORAL DISTURBANCE (HCC): ICD-10-CM

## 2021-10-23 DIAGNOSIS — E03.4 HYPOTHYROIDISM DUE TO ACQUIRED ATROPHY OF THYROID: ICD-10-CM

## 2021-10-23 DIAGNOSIS — G30.1 LATE ONSET ALZHEIMER'S DISEASE WITHOUT BEHAVIORAL DISTURBANCE (HCC): ICD-10-CM

## 2021-10-23 DIAGNOSIS — G30.1 ALZHEIMER'S TYPE DEMENTIA WITH LATE ONSET WITHOUT BEHAVIORAL DISTURBANCE (HCC): ICD-10-CM

## 2021-10-23 DIAGNOSIS — F02.80 DEMENTIA IN ALZHEIMER'S DISEASE WITH EARLY ONSET WITHOUT BEHAVIORAL DISTURBANCE (HCC): ICD-10-CM

## 2021-10-23 DIAGNOSIS — M54.16 LUMBAR RADICULAR PAIN: ICD-10-CM

## 2021-10-24 RX ORDER — PRIMIDONE 50 MG/1
TABLET ORAL
Qty: 45 TABLET | Refills: 5 | Status: SHIPPED | OUTPATIENT
Start: 2021-10-24

## 2021-10-27 RX ORDER — MEMANTINE HYDROCHLORIDE 5 MG/1
TABLET ORAL
Qty: 60 TABLET | Refills: 1 | Status: SHIPPED | OUTPATIENT
Start: 2021-10-27 | End: 2021-11-02 | Stop reason: ALTCHOICE

## 2021-11-02 ENCOUNTER — TELEPHONE (OUTPATIENT)
Dept: NEUROLOGY | Age: 86
End: 2021-11-02

## 2021-11-02 DIAGNOSIS — F02.80 LATE ONSET ALZHEIMER'S DISEASE WITHOUT BEHAVIORAL DISTURBANCE (HCC): ICD-10-CM

## 2021-11-02 DIAGNOSIS — G30.1 LATE ONSET ALZHEIMER'S DISEASE WITHOUT BEHAVIORAL DISTURBANCE (HCC): ICD-10-CM

## 2021-11-02 RX ORDER — MEMANTINE HYDROCHLORIDE 10 MG/1
10 TABLET ORAL 2 TIMES DAILY
Qty: 180 TABLET | Refills: 5 | Status: SHIPPED | OUTPATIENT
Start: 2021-11-02

## 2021-11-02 NOTE — TELEPHONE ENCOUNTER
The pharmacy sent a fax the patient was supposed to have the medication of Namenda to be twice daily    Please send in for 90 day supply is warranted and take out all other scripts

## 2021-11-08 ENCOUNTER — TELEPHONE (OUTPATIENT)
Dept: NEUROLOGY | Age: 86
End: 2021-11-08

## 2021-11-08 NOTE — TELEPHONE ENCOUNTER
I called the patient, advised her to only take the Xanax when she has extremely high anxiety if needed and use sparingly since it doubles her risk of death

## 2021-11-08 NOTE — TELEPHONE ENCOUNTER
----- Message from Tan Tate sent at 11/8/2021  9:11 AM EST -----  Regarding: /telephone  Contact: [phone]  General Message/Vendor Calls    Caller's first and last name: Milagro Caal [facility] Daughter)      Reason for call: Was taken to ER this weekend, she had a really bad panic attack. Wanted to put her on Xanax.  Is concerned about her going on that since she is already taking an anxiety medication       Callback required yes/no and why: Yes to talk to  or nurse      Best contact number(s): [phone]      Details to clarify the request: n/a      Tan Tate

## 2022-02-12 DIAGNOSIS — R26.0 ATAXIC GAIT: ICD-10-CM

## 2022-02-12 DIAGNOSIS — G30.0 DEMENTIA IN ALZHEIMER'S DISEASE WITH EARLY ONSET WITHOUT BEHAVIORAL DISTURBANCE (HCC): ICD-10-CM

## 2022-02-12 DIAGNOSIS — G30.1 LATE ONSET ALZHEIMER'S DISEASE WITHOUT BEHAVIORAL DISTURBANCE (HCC): ICD-10-CM

## 2022-02-12 DIAGNOSIS — F02.80 DEMENTIA IN ALZHEIMER'S DISEASE WITH EARLY ONSET WITHOUT BEHAVIORAL DISTURBANCE (HCC): ICD-10-CM

## 2022-02-12 DIAGNOSIS — M79.7 FIBROMYALGIA: ICD-10-CM

## 2022-02-12 DIAGNOSIS — I67.89 CEREBRAL MICROVASCULAR DISEASE: ICD-10-CM

## 2022-02-12 DIAGNOSIS — I65.23 STENOSIS OF BOTH INTERNAL CAROTID ARTERIES: ICD-10-CM

## 2022-02-12 DIAGNOSIS — G30.1 ALZHEIMER'S TYPE DEMENTIA WITH LATE ONSET WITHOUT BEHAVIORAL DISTURBANCE (HCC): ICD-10-CM

## 2022-02-12 DIAGNOSIS — E03.4 HYPOTHYROIDISM DUE TO ACQUIRED ATROPHY OF THYROID: ICD-10-CM

## 2022-02-12 DIAGNOSIS — F02.80 ALZHEIMER'S TYPE DEMENTIA WITH LATE ONSET WITHOUT BEHAVIORAL DISTURBANCE (HCC): ICD-10-CM

## 2022-02-12 DIAGNOSIS — F02.80 LATE ONSET ALZHEIMER'S DISEASE WITHOUT BEHAVIORAL DISTURBANCE (HCC): ICD-10-CM

## 2022-02-12 RX ORDER — BUSPIRONE HYDROCHLORIDE 10 MG/1
TABLET ORAL
Qty: 100 TABLET | Refills: 5 | Status: SHIPPED | OUTPATIENT
Start: 2022-02-12

## 2022-02-28 ENCOUNTER — HOSPITAL ENCOUNTER (EMERGENCY)
Age: 87
Discharge: HOME | End: 2022-02-28
Attending: STUDENT IN AN ORGANIZED HEALTH CARE EDUCATION/TRAINING PROGRAM | Admitting: STUDENT IN AN ORGANIZED HEALTH CARE EDUCATION/TRAINING PROGRAM
Payer: MEDICARE

## 2022-02-28 VITALS
HEART RATE: 76 BPM | DIASTOLIC BLOOD PRESSURE: 77 MMHG | TEMPERATURE: 98.1 F | WEIGHT: 170 LBS | SYSTOLIC BLOOD PRESSURE: 128 MMHG | RESPIRATION RATE: 16 BRPM | OXYGEN SATURATION: 98 % | BODY MASS INDEX: 28.32 KG/M2 | HEIGHT: 65 IN

## 2022-02-28 DIAGNOSIS — R53.81 MALAISE AND FATIGUE: ICD-10-CM

## 2022-02-28 DIAGNOSIS — N39.0 URINARY TRACT INFECTION WITHOUT HEMATURIA, SITE UNSPECIFIED: Primary | ICD-10-CM

## 2022-02-28 DIAGNOSIS — R53.83 MALAISE AND FATIGUE: ICD-10-CM

## 2022-02-28 LAB
ALBUMIN SERPL-MCNC: 4 G/DL (ref 3.5–5)
ALBUMIN/GLOB SERPL: 1.2 {RATIO} (ref 1.1–2.2)
ALP SERPL-CCNC: 74 U/L (ref 45–117)
ALT SERPL-CCNC: 23 U/L (ref 12–78)
ANION GAP SERPL CALC-SCNC: 10 MMOL/L (ref 5–15)
APPEARANCE UR: ABNORMAL
AST SERPL-CCNC: 19 U/L (ref 15–37)
ATRIAL RATE: 71 BPM
BACTERIA URNS QL MICRO: ABNORMAL /HPF
BASOPHILS # BLD: 0.1 K/UL (ref 0–0.1)
BASOPHILS NFR BLD: 1 % (ref 0–1)
BILIRUB SERPL-MCNC: 0.6 MG/DL (ref 0.2–1)
BILIRUB UR QL: NEGATIVE
BUN SERPL-MCNC: 17 MG/DL (ref 6–20)
BUN/CREAT SERPL: 17 (ref 12–20)
CALCIUM SERPL-MCNC: 9.6 MG/DL (ref 8.5–10.1)
CALCULATED P AXIS, ECG09: -9 DEGREES
CALCULATED R AXIS, ECG10: -29 DEGREES
CALCULATED T AXIS, ECG11: 161 DEGREES
CHLORIDE SERPL-SCNC: 103 MMOL/L (ref 97–108)
CO2 SERPL-SCNC: 27 MMOL/L (ref 21–32)
COLOR UR: ABNORMAL
CREAT SERPL-MCNC: 1 MG/DL (ref 0.55–1.02)
DIAGNOSIS, 93000: NORMAL
DIFFERENTIAL METHOD BLD: ABNORMAL
EOSINOPHIL # BLD: 0.1 K/UL (ref 0–0.4)
EOSINOPHIL NFR BLD: 1 % (ref 0–7)
EPITH CASTS URNS QL MICRO: ABNORMAL /LPF
ERYTHROCYTE [DISTWIDTH] IN BLOOD BY AUTOMATED COUNT: 15.2 % (ref 11.5–14.5)
GLOBULIN SER CALC-MCNC: 3.4 G/DL (ref 2–4)
GLUCOSE SERPL-MCNC: 106 MG/DL (ref 65–100)
GLUCOSE UR STRIP.AUTO-MCNC: NEGATIVE MG/DL
HCT VFR BLD AUTO: 43.3 % (ref 35–47)
HGB BLD-MCNC: 13.8 G/DL (ref 11.5–16)
HGB UR QL STRIP: ABNORMAL
IMM GRANULOCYTES # BLD AUTO: 0.1 K/UL (ref 0–0.04)
IMM GRANULOCYTES NFR BLD AUTO: 1 % (ref 0–0.5)
KETONES UR QL STRIP.AUTO: NEGATIVE MG/DL
LEUKOCYTE ESTERASE UR QL STRIP.AUTO: ABNORMAL
LYMPHOCYTES # BLD: 2.3 K/UL (ref 0.8–3.5)
LYMPHOCYTES NFR BLD: 21 % (ref 12–49)
MCH RBC QN AUTO: 28.9 PG (ref 26–34)
MCHC RBC AUTO-ENTMCNC: 31.9 G/DL (ref 30–36.5)
MCV RBC AUTO: 90.8 FL (ref 80–99)
MONOCYTES # BLD: 0.7 K/UL (ref 0–1)
MONOCYTES NFR BLD: 7 % (ref 5–13)
NEUTS SEG # BLD: 7.6 K/UL (ref 1.8–8)
NEUTS SEG NFR BLD: 69 % (ref 32–75)
NITRITE UR QL STRIP.AUTO: POSITIVE
NRBC # BLD: 0 K/UL (ref 0–0.01)
NRBC BLD-RTO: 0 PER 100 WBC
P-R INTERVAL, ECG05: 108 MS
PH UR STRIP: 6 [PH] (ref 5–8)
PLATELET # BLD AUTO: 591 K/UL (ref 150–400)
PMV BLD AUTO: 10.1 FL (ref 8.9–12.9)
POTASSIUM SERPL-SCNC: 3.9 MMOL/L (ref 3.5–5.1)
PROT SERPL-MCNC: 7.4 G/DL (ref 6.4–8.2)
PROT UR STRIP-MCNC: ABNORMAL MG/DL
Q-T INTERVAL, ECG07: 392 MS
QRS DURATION, ECG06: 74 MS
QTC CALCULATION (BEZET), ECG08: 425 MS
RBC # BLD AUTO: 4.77 M/UL (ref 3.8–5.2)
RBC #/AREA URNS HPF: ABNORMAL /HPF (ref 0–5)
SODIUM SERPL-SCNC: 140 MMOL/L (ref 136–145)
SP GR UR REFRACTOMETRY: 1.02 (ref 1–1.03)
TROPONIN-HIGH SENSITIVITY: 16 NG/L (ref 0–51)
TROPONIN-HIGH SENSITIVITY: 19 NG/L (ref 0–51)
UR CULT HOLD, URHOLD: NORMAL
UROBILINOGEN UR QL STRIP.AUTO: 0.2 EU/DL (ref 0.2–1)
VENTRICULAR RATE, ECG03: 71 BPM
WBC # BLD AUTO: 10.8 K/UL (ref 3.6–11)
WBC URNS QL MICRO: >100 /HPF (ref 0–4)

## 2022-02-28 PROCEDURE — 85025 COMPLETE CBC W/AUTO DIFF WBC: CPT

## 2022-02-28 PROCEDURE — 36415 COLL VENOUS BLD VENIPUNCTURE: CPT

## 2022-02-28 PROCEDURE — 96374 THER/PROPH/DIAG INJ IV PUSH: CPT

## 2022-02-28 PROCEDURE — 80053 COMPREHEN METABOLIC PANEL: CPT

## 2022-02-28 PROCEDURE — 93005 ELECTROCARDIOGRAM TRACING: CPT

## 2022-02-28 PROCEDURE — 81001 URINALYSIS AUTO W/SCOPE: CPT

## 2022-02-28 PROCEDURE — 96361 HYDRATE IV INFUSION ADD-ON: CPT

## 2022-02-28 PROCEDURE — 84484 ASSAY OF TROPONIN QUANT: CPT

## 2022-02-28 PROCEDURE — 99284 EMERGENCY DEPT VISIT MOD MDM: CPT

## 2022-02-28 PROCEDURE — 74011250636 HC RX REV CODE- 250/636: Performed by: STUDENT IN AN ORGANIZED HEALTH CARE EDUCATION/TRAINING PROGRAM

## 2022-02-28 RX ORDER — CEPHALEXIN 500 MG/1
500 CAPSULE ORAL 4 TIMES DAILY
Qty: 20 CAPSULE | Refills: 0 | Status: SHIPPED | OUTPATIENT
Start: 2022-02-28 | End: 2022-03-05

## 2022-02-28 RX ORDER — ONDANSETRON 2 MG/ML
4 INJECTION INTRAMUSCULAR; INTRAVENOUS
Status: COMPLETED | OUTPATIENT
Start: 2022-02-28 | End: 2022-02-28

## 2022-02-28 RX ADMIN — SODIUM CHLORIDE 500 ML: 9 INJECTION, SOLUTION INTRAVENOUS at 16:48

## 2022-02-28 RX ADMIN — ONDANSETRON HYDROCHLORIDE 4 MG: 2 SOLUTION INTRAMUSCULAR; INTRAVENOUS at 16:49

## 2022-02-28 NOTE — ED TRIAGE NOTES
Pt reports continuous nausea and weakness x1-2 days. Pt reports \"she felt more shakey and weak this am.\" Daughter reports patient has frequent UTIs and patient reports she often does not have symptoms with a UTI.

## 2022-02-28 NOTE — ED PROVIDER NOTES
The history is provided by the patient and a relative. Fatigue  This is a new problem. The current episode started yesterday. The problem has not changed since onset. There was no focality noted (feels \"out of it\"; described as difficulty with congnition, dizziness, malaise, and nausea. ). Pertinent negatives include no focal weakness, no slurred speech and no speech difficulty. There has been no fever. Associated symptoms include nausea. Pertinent negatives include no shortness of breath, no chest pain, no altered mental status and no confusion. Associated symptoms comments: +myalgias, +back pain. There were no medications administered prior to arrival. Associated medical issues include dementia. Associated medical issues do not include trauma.         Past Medical History:   Diagnosis Date    Arthritis     Asthma     Chronic pain     Dementia (HCC)     GERD (gastroesophageal reflux disease)     Hypertension     Vertigo        Past Surgical History:   Procedure Laterality Date    HX APPENDECTOMY      HX BACK SURGERY  05/21/2015    stimulator Medtronic     HX CATARACT REMOVAL      bilateral    HX CERVICAL FUSION      anterior disc fusion    HX HEENT      uppp    HX LAP CHOLECYSTECTOMY      HX ORTHOPAEDIC      right carpal tunnel release    HX MATT AND BSO           Family History:   Problem Relation Age of Onset    Stroke Mother     Cancer Father        Social History     Socioeconomic History    Marital status:      Spouse name: Not on file    Number of children: Not on file    Years of education: Not on file    Highest education level: Not on file   Occupational History    Not on file   Tobacco Use    Smoking status: Former Smoker    Smokeless tobacco: Never Used    Tobacco comment: stopped 25 plus years ago   Vaping Use    Vaping Use: Never used   Substance and Sexual Activity    Alcohol use: No    Drug use: No    Sexual activity: Not on file   Other Topics Concern    Not on file   Social History Narrative    Not on file     Social Determinants of Health     Financial Resource Strain:     Difficulty of Paying Living Expenses: Not on file   Food Insecurity:     Worried About Running Out of Food in the Last Year: Not on file    Bryan of Food in the Last Year: Not on file   Transportation Needs:     Lack of Transportation (Medical): Not on file    Lack of Transportation (Non-Medical): Not on file   Physical Activity:     Days of Exercise per Week: Not on file    Minutes of Exercise per Session: Not on file   Stress:     Feeling of Stress : Not on file   Social Connections:     Frequency of Communication with Friends and Family: Not on file    Frequency of Social Gatherings with Friends and Family: Not on file    Attends [employer] Services: Not on file    Active Member of [address] [place] [address] [employer] or Organizations: Not on file    Attends Club or Organization Meetings: Not on file    Marital Status: Not on file   Intimate Partner Violence:     Fear of Current or Ex-Partner: Not on file    Emotionally Abused: Not on file    Physically Abused: Not on file    Sexually Abused: Not on file   Housing Stability:     Unable to Pay for Housing in the Last Year: Not on file    Number of [place] in the Last Year: Not on file    Unstable Housing in the Last Year: Not on file         ALLERGIES: Adhesive tape    Review of Systems   Constitutional: Positive for fatigue. Respiratory: Negative for cough and shortness of breath. Cardiovascular: Negative for chest pain. Gastrointestinal: Positive for nausea. Musculoskeletal: Negative for neck pain. Skin: Negative for wound. Neurological: Positive for dizziness. Negative for focal weakness and speech difficulty. Psychiatric/Behavioral: Negative for confusion. All other systems reviewed and are negative.       Vitals:    02/28/22 1532   BP: (!) 154/63   Pulse: 76   Resp: 16   Temp: 98.1 °F (36.7 °C)   SpO2: 98%   Weight: 77.1 kg (170 lb) Height: 5' 5\" (1.651 m)            Physical Exam  Vitals and nursing note reviewed. Constitutional:       General: She is not in acute distress. Appearance: She is well-developed. HENT:      Head: Normocephalic and atraumatic. Eyes:      Conjunctiva/sclera: Conjunctivae normal.   Cardiovascular:      Rate and Rhythm: Normal rate and regular rhythm. Pulmonary:      Effort: Pulmonary effort is normal. No respiratory distress. Abdominal:      Palpations: Abdomen is soft. Tenderness: There is no abdominal tenderness. There is no guarding. Musculoskeletal:         General: Normal range of motion. Cervical back: Normal range of motion and neck supple. No rigidity. Skin:     General: Skin is warm and dry. Neurological:      Mental Status: She is alert. Mental status is at baseline. Cranial Nerves: No dysarthria or facial asymmetry. Motor: No abnormal muscle tone. MDM       Procedures      EKG interpretation:   Rhythm: sinus rhythm with artifact; and regular . Rate (approx.): 71; Axis: normal; Intervals: IN shortened at 108ms; ST/T wave: non-specific, no STEMI within limitation of artifact; EKG documented and interpreted by Stas Gutierrez MD, ED MD.    A/P: This is a 75-year-old female with history of dementia, arthritis, and previous gait problems here with 1 to 2 days of fatigue, malaise, and body aches, mostly back pain. Daughter states she sometimes gets this way in the setting of UTIs. She is not having any urinary symptoms per se. Vital signs unremarkable and the rest of her physical exam is benign without any focal neurological deficits. Plan obtain basic labs to screen for any metabolic abnormality, as well as urinalysis to screen for UTI. Will gently hydrate and give additional antiemetic here. Disposition depends on results and clinical course.

## 2022-03-18 PROBLEM — M48.061 SPINAL STENOSIS OF LUMBAR REGION WITHOUT NEUROGENIC CLAUDICATION: Status: ACTIVE | Noted: 2020-02-21

## 2022-03-18 PROBLEM — E11.42 DIABETIC PERIPHERAL NEUROPATHY ASSOCIATED WITH TYPE 2 DIABETES MELLITUS (HCC): Status: ACTIVE | Noted: 2021-10-04

## 2022-03-18 PROBLEM — G60.9 IDIOPATHIC SMALL FIBER PERIPHERAL NEUROPATHY: Status: ACTIVE | Noted: 2021-10-04

## 2022-03-19 PROBLEM — F02.80 ALZHEIMER'S TYPE DEMENTIA WITH LATE ONSET WITHOUT BEHAVIORAL DISTURBANCE (HCC): Status: ACTIVE | Noted: 2020-02-21

## 2022-03-19 PROBLEM — E03.4 HYPOTHYROIDISM DUE TO ACQUIRED ATROPHY OF THYROID: Status: ACTIVE | Noted: 2017-07-07

## 2022-03-19 PROBLEM — I67.89 CEREBRAL MICROVASCULAR DISEASE: Status: ACTIVE | Noted: 2017-07-07

## 2022-03-19 PROBLEM — G30.1 ALZHEIMER'S TYPE DEMENTIA WITH LATE ONSET WITHOUT BEHAVIORAL DISTURBANCE (HCC): Status: ACTIVE | Noted: 2020-02-21

## 2022-03-19 PROBLEM — G30.0 DEMENTIA IN ALZHEIMER'S DISEASE WITH EARLY ONSET WITHOUT BEHAVIORAL DISTURBANCE (HCC): Status: ACTIVE | Noted: 2018-07-06

## 2022-03-19 PROBLEM — F02.80 DEMENTIA IN ALZHEIMER'S DISEASE WITH EARLY ONSET WITHOUT BEHAVIORAL DISTURBANCE (HCC): Status: ACTIVE | Noted: 2018-07-06

## 2022-03-19 PROBLEM — G30.1 LATE ONSET ALZHEIMER'S DISEASE WITHOUT BEHAVIORAL DISTURBANCE (HCC): Status: ACTIVE | Noted: 2020-02-21

## 2022-03-19 PROBLEM — E55.9 VITAMIN D DEFICIENCY: Status: ACTIVE | Noted: 2017-07-07

## 2022-03-19 PROBLEM — F33.9 RECURRENT DEPRESSION (HCC): Status: ACTIVE | Noted: 2018-01-09

## 2022-03-19 PROBLEM — F02.80 LATE ONSET ALZHEIMER'S DISEASE WITHOUT BEHAVIORAL DISTURBANCE (HCC): Status: ACTIVE | Noted: 2020-02-21

## 2022-03-19 PROBLEM — E53.8 B12 DEFICIENCY: Status: ACTIVE | Noted: 2021-10-04

## 2022-03-20 PROBLEM — I65.23 STENOSIS OF BOTH INTERNAL CAROTID ARTERIES: Status: ACTIVE | Noted: 2017-07-07

## 2022-03-20 PROBLEM — R13.10 DYSPHAGIA: Status: ACTIVE | Noted: 2018-07-06

## 2022-03-22 ENCOUNTER — OFFICE VISIT (OUTPATIENT)
Dept: NEUROLOGY | Age: 87
End: 2022-03-22
Payer: MEDICARE

## 2022-03-22 VITALS
SYSTOLIC BLOOD PRESSURE: 122 MMHG | DIASTOLIC BLOOD PRESSURE: 74 MMHG | TEMPERATURE: 97.7 F | WEIGHT: 170 LBS | HEART RATE: 79 BPM | OXYGEN SATURATION: 97 % | RESPIRATION RATE: 18 BRPM | BODY MASS INDEX: 28.29 KG/M2

## 2022-03-22 DIAGNOSIS — M96.1 LUMBAR POST-LAMINECTOMY SYNDROME: ICD-10-CM

## 2022-03-22 DIAGNOSIS — F02.80 DEMENTIA IN ALZHEIMER'S DISEASE WITH EARLY ONSET WITHOUT BEHAVIORAL DISTURBANCE (HCC): ICD-10-CM

## 2022-03-22 DIAGNOSIS — G30.0 DEMENTIA IN ALZHEIMER'S DISEASE WITH EARLY ONSET WITHOUT BEHAVIORAL DISTURBANCE (HCC): ICD-10-CM

## 2022-03-22 DIAGNOSIS — G30.1 LATE ONSET ALZHEIMER'S DISEASE WITHOUT BEHAVIORAL DISTURBANCE (HCC): ICD-10-CM

## 2022-03-22 DIAGNOSIS — F02.80 ALZHEIMER'S TYPE DEMENTIA WITH LATE ONSET WITHOUT BEHAVIORAL DISTURBANCE (HCC): ICD-10-CM

## 2022-03-22 DIAGNOSIS — I65.23 STENOSIS OF BOTH INTERNAL CAROTID ARTERIES: Primary | ICD-10-CM

## 2022-03-22 DIAGNOSIS — E53.8 B12 DEFICIENCY: ICD-10-CM

## 2022-03-22 DIAGNOSIS — G30.1 ALZHEIMER'S TYPE DEMENTIA WITH LATE ONSET WITHOUT BEHAVIORAL DISTURBANCE (HCC): ICD-10-CM

## 2022-03-22 DIAGNOSIS — F02.80 LATE ONSET ALZHEIMER'S DISEASE WITHOUT BEHAVIORAL DISTURBANCE (HCC): ICD-10-CM

## 2022-03-22 DIAGNOSIS — G25.0 BENIGN ESSENTIAL TREMOR SYNDROME: ICD-10-CM

## 2022-03-22 DIAGNOSIS — E11.42 DIABETIC PERIPHERAL NEUROPATHY ASSOCIATED WITH TYPE 2 DIABETES MELLITUS (HCC): ICD-10-CM

## 2022-03-22 DIAGNOSIS — R26.0 ATAXIC GAIT: ICD-10-CM

## 2022-03-22 DIAGNOSIS — I67.89 CEREBRAL MICROVASCULAR DISEASE: ICD-10-CM

## 2022-03-22 DIAGNOSIS — G60.9 IDIOPATHIC SMALL FIBER PERIPHERAL NEUROPATHY: ICD-10-CM

## 2022-03-22 DIAGNOSIS — M54.16 LUMBAR RADICULAR PAIN: ICD-10-CM

## 2022-03-22 DIAGNOSIS — M48.061 SPINAL STENOSIS OF LUMBAR REGION WITHOUT NEUROGENIC CLAUDICATION: ICD-10-CM

## 2022-03-22 DIAGNOSIS — M79.7 FIBROMYALGIA: ICD-10-CM

## 2022-03-22 PROCEDURE — 99214 OFFICE O/P EST MOD 30 MIN: CPT | Performed by: PSYCHIATRY & NEUROLOGY

## 2022-03-22 PROCEDURE — 1090F PRES/ABSN URINE INCON ASSESS: CPT | Performed by: PSYCHIATRY & NEUROLOGY

## 2022-03-22 PROCEDURE — 1101F PT FALLS ASSESS-DOCD LE1/YR: CPT | Performed by: PSYCHIATRY & NEUROLOGY

## 2022-03-22 PROCEDURE — G9717 DOC PT DX DEP/BP F/U NT REQ: HCPCS | Performed by: PSYCHIATRY & NEUROLOGY

## 2022-03-22 PROCEDURE — G8427 DOCREV CUR MEDS BY ELIG CLIN: HCPCS | Performed by: PSYCHIATRY & NEUROLOGY

## 2022-03-22 PROCEDURE — G8419 CALC BMI OUT NRM PARAM NOF/U: HCPCS | Performed by: PSYCHIATRY & NEUROLOGY

## 2022-03-22 PROCEDURE — G8536 NO DOC ELDER MAL SCRN: HCPCS | Performed by: PSYCHIATRY & NEUROLOGY

## 2022-03-22 RX ORDER — GABAPENTIN 300 MG/1
300 CAPSULE ORAL 3 TIMES DAILY
Qty: 120 CAPSULE | Refills: 5 | Status: SHIPPED | OUTPATIENT
Start: 2022-03-22 | End: 2022-07-26 | Stop reason: SDUPTHER

## 2022-03-22 NOTE — LETTER
3/22/2022    Patient: Joselyn Edmonds   YOB: 1934   Date of Visit: 3/22/2022     Jose Antonio Zuleta MD  [address]  [address] [place] [address]  Via Fax: [phone]    Dear Jose Antonio Zuleta MD,      Thank you for referring Ms. Joselyn Edmonds to [address] [place] [address] for evaluation. My notes for this consultation are attached. Consult    Subjective:     Joselyn Edmonds is a 80 y. o.right-handed  female seen today at the request of Dr. Jocelyn Castro for urgent work in evaluation of new problem of patient having an episode of anxiety attack and panic attack requiring the rescue squad to come get her because she thought she was going to have a heart attack, and her pulse was very high, and they took her to the emergency room where she was checked out and was thought to be more anxiety, and she is referred back to us on urgent work in basis case I will not renew to change her medication. In general she does not have these anxiety attacks very often, this was unusual.  The family also wants to know whether the gabapentin was giving her at 300 mg 3 times a day really helpful, and I told to make her stop the medication and see if her back pain gets worse, but before we give it to her she had seem to get better with that. She sees her pain management physician and for her back problem we advised him again that there is no other treatment other than pain management because she is not a surgical candidate. She is already on Aricept and Namenda at maximum doses. We discussed the new monoclonal antibody for amyloid and explained that it did not get coverage by Medicare so therefore is not really available except in a controlled study.   We talked at length about whether she needs assisted living at home, and I advised the family that looks like with partial assisted living with her granddaughter living there and I thought at this time she probably could stay with that set up may be getting an Aide in during the daytime to keep her company, and hopefully the Alzheimer's Society could recommend people. 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 She is taking the Neurontin 300 mg 3 times a day which should help with neuropathy pain but she is taking it also for her back pain which is severe and she is followed by pain management physician with repeat injections and other therapies. She also has bad arthritis in her knees and gets injections there to. They are talking about activating her spinal cord stimulator again also. She is in pain management and wants to know what else can be done, and we advised her at her age, surgery is contraindicated, and all we have his pain management really. Still has a lot of family stress and illnesses amongst relatives, and a lot of family stress. She uses a Rollator to ambulate because of her slightly unsteady gait particularly out of the house. She has had no more falls recently using the Rollator. She denies any other recent injuries or falls, and denies any major headache, or any difficulty with her arms or cranial nerves. Most likely her problem is due to her spinal stenosis and post lumbar laminectomy syndrome and not only options of pain management or therapy we will send her to physical therapy and she already has pain management.   She is also having more difficulty with pain in her legs, and a burning numbness at times, and she is going to get another epidural steroid injection in the near future with her pain management physician Dr. Prachi Hernandez. She is also having increasing tremors when she tries to write or hold things in her hands, right side greater than left, and increasing tremors inside her stomach that she seems preoccupied with. She is better on 25 mg of Mysoline, but the family is concerned because they seem to be getting a little worse again. Her tremors however on exam today do not seem that bad, so we will continue her current medication dose. We could increase the Mysoline in the future, but I don't want to add to new medications at this time to keep the side effects to a minimum. She remains off the clonazepam which we had to work very hard to get her off of and now on the BuSpar. She had a past history of mini strokes. She just had a carotid Doppler study done March 2020 that showed mild disease only and no progression of disease. She is try to exercise more. Patient had a spinal cord stimulator inserted which is not helped all that much so far, may be a little, and so far she had recent radiofrequency ablation of the spinal nerve, and a medial branch denervation procedure or injection to help control her pain which has helped some. She is going to see her pain doctor Dr. Matt Daniels in another week. She has moved out of her daughter's house is now living alone, and seem to be doing okay. Her swallowing seems better after GI evaluation. Her PCP is monitoring her blood pressure, cholesterol, and she does not smoke, and she try to watch her diet. She takes aspirin daily. She is on blood pressure pills and cholesterol medications. She has chronic back pain, and is under pain management. We cut down her gabapentin to 300 mg twice a day, and she is doing a little better with that. We discussed her condition with the patient and her daughter at length. She continues to take the Aricept, does not think she needs any new medication. She continues her vitamins and vitamin D on a regular basis. She also takes B12. .  She has had no more falls, and continues a regular exercise program.  That all seems to be somewhat better.     She denies any headache, any increased neck pain, but does have chronic low back pain and post lumbar laminectomy syndrome and is followed by pain management physician with intermittent injections in her spine. This may be also contributing to her gait instability. She had a normal CT of the head 2 years ago because of her ataxia, and did physical therapy. She still drives and has not had an accident but only drives locally. She feels no problem doing it. She has also seen for increasing back pain and has spinal cord stimulator insertion for post lumbar laminectomy syndrome. She has weakness in her legs and difficulty walking, and she had an MRI scan of the lumbar spine apparently shows a disc and spinal stenosis. She doesn't seem to have much new weakness in the legs, and her bowel and bladder function is stable except for increasing difficulty emptying her bladder. She is seeing a urologist. She had previous back surgery and has a postlaminectomy syndrome, and now a new lumbar disc and spinal stenosis. Besides the weakness in her legs, she's had no other new weakness, sensory loss visual changes or headaches, or other new neurological problems except for the neurogenic bladder. She is using a cane or walker for ambulation because of her back pain. For her increasing back pain we offered her physical therapy but she would rather try her own exercises at home. Her past medical history is pertinent for hypertension, depression, GERD, asthma, arthritis, post laminectomy syndrome, lumbar radiculopathy, a central tremor, dementia, ataxic gait, cataract surgery, cervical fusion, right carpal tunnel syndrome, post cervical laminectomy fusion. Hysterectomy and bilateral salpingo-oophorectomy, gallbladder surgery, right carpal tunnel surgery.     Past Medical History:   Diagnosis Date    Arthritis     Asthma     Chronic pain     Dementia (HCC)     GERD (gastroesophageal reflux disease)     Hypertension     Vertigo       Past Surgical History:   Procedure Laterality Date    HX APPENDECTOMY      HX BACK SURGERY  05/21/2015    stimulator Medtronic     HX CATARACT REMOVAL      bilateral    HX CERVICAL FUSION      anterior disc fusion    HX HEENT      uppp    HX LAP CHOLECYSTECTOMY      HX ORTHOPAEDIC      right carpal tunnel release    HX MATT AND BSO       Family History   Problem Relation Age of Onset    Stroke Mother     Cancer Father       Social History     Tobacco Use    Smoking status: Former Smoker    Smokeless tobacco: Never Used    Tobacco comment: stopped 25 plus years ago   Substance Use Topics    Alcohol use: No       Current Outpatient Medications   Medication Sig Dispense Refill    gabapentin (NEURONTIN) 300 mg capsule Take 1 Capsule by mouth three (3) times daily. Max Daily Amount: 900 mg. 120 Capsule 5    busPIRone (BUSPAR) 10 mg tablet TAKE 1 TABLET BY MOUTH THREE TIMES A  Tablet 5    memantine (Namenda) 10 mg tablet Take 1 Tablet by mouth two (2) times a day. 180 Tablet 5    primidone (MYSOLINE) 50 mg tablet TAKE 1/2 TABLET BY MOUTH NIGHTLY 45 Tablet 5    donepeziL (ARICEPT) 10 mg tablet Take 1 Tablet by mouth daily. 90 Tablet 3    alendronate (FOSAMAX) 70 mg tablet TAKE 1 TABLET BY MOUTH ONCE WEEKLY ON AN EMPTY STOMACH WITH NOTHING ELSE      diclofenac (VOLTAREN) 1 % gel Apply 2 g to affected area four (4) times daily.  estradioL (Estrace) 0.01 % (0.1 mg/gram) vaginal cream Insert 2 g into vagina daily.  cholecalciferol (Vitamin D3) 25 mcg (1,000 unit) cap Take  by mouth daily.  BREO ELLIPTA 100-25 mcg/dose inhaler INHALE 1 PUFF D      albuterol sulfate (PROAIR RESPICLICK) 90 mcg/actuation aepb ProAir RespiClick 90 mcg/actuation breath activated      traMADol (ULTRAM) 50 mg tablet Take 50 mg by mouth every six (6) hours as needed for Pain.  montelukast (SINGULAIR) 10 mg tablet TK 1 T PO D  3    CYANOCOBALAMIN, VITAMIN B-12, (VITAMIN B-12 PO) Take  by mouth daily.       aspirin delayed-release 81 mg tablet Take  by mouth daily.  colestipol (COLESTID) 1 gram tablet Take 1 g by mouth two (2) times a day.  traZODone (DESYREL) 50 mg tablet Take 50 mg by mouth nightly.  dicyclomine (BENTYL) 10 mg capsule Take 10 mg by mouth four (4) times daily.  losartan (COZAAR) 100 mg tablet Take 100 mg by mouth daily.  sertraline (ZOLOFT) 100 mg tablet Take 100 mg by mouth every morning.  omeprazole (PRILOSEC) 40 mg capsule Take 40 mg by mouth daily. Allergies   Allergen Reactions    Adhesive Tape Other (comments)     hurts the area it is placed on        Review of Systems:  A comprehensive review of systems was negative except for: Musculoskeletal: positive for myalgias, arthralgias, stiff joints and back pain  Neurological: positive for memory problems and tremor  Behvioral/Psych: positive for anxiety and depression     Objective:     I      NEUROLOGICAL EXAM:    Appearance: The patient is well developed, well nourished, provides a fair history and is in no acute distress. Mental Status: Oriented to time, place and person and the president, but misses the day of the month. Her speech is 100% intelligible, and she has mild cognitive impairment because of her dementia. She can remember only one of 3 words at 30 seconds with distraction, and cannot subtract serial sevens, or spell the word world backward, or draw a clock that shows a time 10 minutes to 11. Patient is a little slow mentally for some recent memory loss but actually functioning fairly well in her ADLs. Mood and affect slightly anxious and depressed. Cranial Nerves:   Intact visual fields. Fundi are benign, discs are flat no lesions seen on funduscopy. . FRANK, EOM's full, no nystagmus, no ptosis. Facial sensation is normal. Corneal reflexes are not tested. Facial movement is symmetric. Hearing is abnormal bilaterally.  Palate is midline with normal sternocleidomastoid and trapezius muscles are normal. Tongue is midline. Neck without meningismus or bruits  Temporal arteries are not tender or enlarged   Motor:  4/5 strength in upper and lower proximal and distal muscles. Normal bulk and tone. No fasciculations. Patient has negative straight leg raising test negative in the sitting position but has percussion tenderness over the lumbar spine  Patient had a spinal stimulator in the lumbar spine with stable surgical incisions  Rapid altering movement is slow in all extremities   Reflexes:   Deep tendon reflexes 1+/4 and symmetrical. No Babinski or clonus present   Sensory:   Normal to touch, pinprick and DSS, and temperature and vibration mildly decreased in both lower extremities. Gait:  Abnormal gait for age, as she does move slowly due to arthritis in her back, walk with a limp on her right leg, and normally uses a Rollator for ambulation. Tremor:   Mild intention tremor noted. Cerebellar:  Mldly abnormal Romberg and tandem cerebellar signs present  And finger-nose-finger examination shows no dysmetria. Neurovascular:  Normal heart sounds and regular rhythm, peripheral pulses decreased in both feet, and no carotid bruits. Assessment:       Plan:     Patient with new problem of having panic attacks and some worsening memory was slowly with time, we discussed the new medication the monoclonal antibody against amyloid and the fact that Medicare will not cover it, and is not really available on this is in a controlled trial, and have explained the risk and benefits the family not interested. She does not need more medication for her panic attacks, she is already on maximum dose of Namenda and Aricept, and nicely for her tremor which is helped quite a bit, and the gabapentin 300 mg 3 times a day for her neuropathy and back pain, and was advised just to continue that.   For her neuropathy hemoglobin A1c was 6.1 she did have a mildly abnormal elevated protein on serum protein electrophoresis with a urine study this appeared similar, we will just repeat the serum study first to see what that shows. If we need to we can send her to hematology or do the urine test.  Her essential tremor is better on the Mysoline 25 mg at nighttime we will not increase that dose. Because of her slowly deteriorating status. The family Alzheimer's disease Society for her with information to contact them yesterday resources they have that she might be able to benefit from. 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 She will continue the gabapentin both for her back pain and her neuropathy. Dr. Jeanette Isabel talked about reactivating her spinal cord stimulator   Patient is to continue her pain management, and see Dr. Jeanette Isabel next week, and hopefully get an epidural steroid injection if she needs it. She is not a surgical candidate, our only other option is physical therapy we sent her to physical therapy for gait training strengthening and therapy to her back and neck to help with her lumbar radiculopathy. Patient has essential tremor, that seems to be stable on 25 mg of Mysoline each night, we will continue that medication. Patient initially wanted the Mysoline increased because of her abdominal tremors, but these have plateaued and we will continue the BuSpar and leave the Mysoline 25 mg, to try to keep her sedation down, and she mentioned getting back on clonazepam twice a week cannot do that because it doubles her risk of death. If the tremor bothers her more she can see GI. Patient encouraged that she must keep exercising to try to maintain her strength and physical ability but she does not want any more physical therapy now.   Because of increasing right leg radicular pain, we will increase her gabapentin to 4 times a day she is having no side effect on the current dose of medication, hopefully this will help relieve the pain and also help her slightly progressive tremors of essential type that she has in her hands. Patient still has some pain with radiculopathy into her arm some neck arthritis  She with new problem of increasing tremors, look more like benign essential tremor, and are interfering with her ability to function, so we will continue her on Mysoline 25 mg at nighttime advance her gabapentin as above. Her back pain is increasing, despite numerous epidural injections and nerve ablations, and spinal cord stimulator, and advises not much else to do other than pain management and try some physical therapy but she would rather try exercise program on her own at home. Patient has had a carotid Doppler study that was okay March 2020 showed mild disease only. She is had no recurrent TIA symptoms. We discussed her condition with the patient and her daughter in detail. We encourage her to remain active and try to do her back exercises in addition and continue seeing her pain management physician  Patient is encouraged to continue her vitamins, vitamin D, remain mentally and physically active, and start exercising more  39 minutes spent with patient and the daughter, and advised her that she has a progressive degenerative brain condition that will only get worse with time. CT of the head and metabolic parameters were all normal 12 months ago  Patient is to continue her other medication and try to continue a regular exercise program  Patient is to see her pain management specialist, for continued treatment and spinal cord stimulator followup  Patient is to continue current medications and start exercising more   We discussed with her and her grand daughter in detail, if there is any doubt about her driving ability she should stop immediately or get tested by [facility]  Patient will be seen again in 6 months time or earlier as needed. She will call if any problem in the interim.   Discuss her condition with patient and daughters at length,   Time of visit was 39 minutes today  She is to keep the lights on in the house at night and be careful when she moves or changes position quickly  She will call if any problem in the interim. Signed By: Alfie Calderon MD     March 22, 2022                 If you have questions, please do not hesitate to call me. I look forward to following your patient along with you.       Sincerely,    Alfie Calderon MD

## 2022-03-22 NOTE — PROGRESS NOTES
Consult    Subjective:     Bhavin Way is a 80 y. o.right-handed  female seen today at the request of Dr. Julio Luna for urgent work in evaluation of new problem of patient having an episode of anxiety attack and panic attack requiring the rescue squad to come get her because she thought she was going to have a heart attack, and her pulse was very high, and they took her to the emergency room where she was checked out and was thought to be more anxiety, and she is referred back to us on urgent work in basis case I will not renew to change her medication. In general she does not have these anxiety attacks very often, this was unusual.  The family also wants to know whether the gabapentin was giving her at 300 mg 3 times a day really helpful, and I told to make her stop the medication and see if her back pain gets worse, but before we give it to her she had seem to get better with that. She sees her pain management physician and for her back problem we advised him again that there is no other treatment other than pain management because she is not a surgical candidate. She is already on Aricept and Namenda at maximum doses. We discussed the new monoclonal antibody for amyloid and explained that it did not get coverage by Medicare so therefore is not really available except in a controlled study. We talked at length about whether she needs assisted living at home, and I advised the family that looks like with partial assisted living with her granddaughter living there and I thought at this time she probably could stay with that set up may be getting an Aide in during the daytime to keep her company, and hopefully the Alzheimer's [employer] could recommend people.    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, and her hemoglobin A1c was 6.1 consistent with this, she did have a monoclonal protein on serum immunoelectrophoresis Relevant Problems   No relevant active problems       Anesthetic History   No history of anesthetic complications            Review of Systems / Medical History  Patient summary reviewed and pertinent labs reviewed    Pulmonary  Within defined limits                 Neuro/Psych   Within defined limits           Cardiovascular                  Exercise tolerance: >4 METS     GI/Hepatic/Renal                Endo/Other             Other Findings              Physical Exam    Airway  Mallampati: II  TM Distance: 4 - 6 cm  Neck ROM: normal range of motion   Mouth opening: Normal     Cardiovascular    Rhythm: regular  Rate: normal         Dental  No notable dental hx       Pulmonary  Breath sounds clear to auscultation               Abdominal  GI exam deferred       Other Findings            Anesthetic Plan    ASA: 1  Anesthesia type: general          Induction: Intravenous  Anesthetic plan and risks discussed with: Patient and Mother and she did obtain a urine 1, but that never returned and there is no record of it in the lab. She is taking the Neurontin 300 mg 3 times a day which should help with neuropathy pain but she is taking it also for her back pain which is severe and she is followed by pain management physician with repeat injections and other therapies. She also has bad arthritis in her knees and gets injections there to. They are talking about activating her spinal cord stimulator again also. She is in pain management and wants to know what else can be done, and we advised her at her age, surgery is contraindicated, and all we have his pain management really. Still has a lot of family stress and illnesses amongst relatives, and a lot of family stress. She uses a Rollator to ambulate because of her slightly unsteady gait particularly out of the house. She has had no more falls recently using the Rollator. She denies any other recent injuries or falls, and denies any major headache, or any difficulty with her arms or cranial nerves. Most likely her problem is due to her spinal stenosis and post lumbar laminectomy syndrome and not only options of pain management or therapy we will send her to physical therapy and she already has pain management. She is also having more difficulty with pain in her legs, and a burning numbness at times, and she is going to get another epidural steroid injection in the near future with her pain management physician Dr. Keri Frausto. She is also having increasing tremors when she tries to write or hold things in her hands, right side greater than left, and increasing tremors inside her stomach that she seems preoccupied with. She is better on 25 mg of Mysoline, but the family is concerned because they seem to be getting a little worse again. Her tremors however on exam today do not seem that bad, so we will continue her current medication dose.   We could increase the Mysoline in the future, but I don't want to add to new medications at this time to keep the side effects to a minimum. She remains off the clonazepam which we had to work very hard to get her off of and now on the BuSpar. She had a past history of mini strokes. She just had a carotid Doppler study done March 2020 that showed mild disease only and no progression of disease. She is try to exercise more. Patient had a spinal cord stimulator inserted which is not helped all that much so far, may be a little, and so far she had recent radiofrequency ablation of the spinal nerve, and a medial branch denervation procedure or injection to help control her pain which has helped some. She is going to see her pain doctor Dr. James Veloz in another week. She has moved out of her daughter's house is now living alone, and seem to be doing okay. Her swallowing seems better after GI evaluation. Her PCP is monitoring her blood pressure, cholesterol, and she does not smoke, and she try to watch her diet. She takes aspirin daily. She is on blood pressure pills and cholesterol medications. She has chronic back pain, and is under pain management. We cut down her gabapentin to 300 mg twice a day, and she is doing a little better with that. We discussed her condition with the patient and her daughter at length. She continues to take the Aricept, does not think she needs any new medication. She continues her vitamins and vitamin D on a regular basis. She also takes B12. .  She has had no more falls, and continues a regular exercise program.  That all seems to be somewhat better. She denies any headache, any increased neck pain, but does have chronic low back pain and post lumbar laminectomy syndrome and is followed by pain management physician with intermittent injections in her spine. This may be also contributing to her gait instability. She had a normal CT of the head 2 years ago because of her ataxia, and did physical therapy.   She still drives and has not had an accident but only drives locally. She feels no problem doing it. She has also seen for increasing back pain and has spinal cord stimulator insertion for post lumbar laminectomy syndrome. She has weakness in her legs and difficulty walking, and she had an MRI scan of the lumbar spine apparently shows a disc and spinal stenosis. She doesn't seem to have much new weakness in the legs, and her bowel and bladder function is stable except for increasing difficulty emptying her bladder. She is seeing a urologist. She had previous back surgery and has a postlaminectomy syndrome, and now a new lumbar disc and spinal stenosis. Besides the weakness in her legs, she's had no other new weakness, sensory loss visual changes or headaches, or other new neurological problems except for the neurogenic bladder. She is using a cane or walker for ambulation because of her back pain. For her increasing back pain we offered her physical therapy but she would rather try her own exercises at home. Her past medical history is pertinent for hypertension, depression, GERD, asthma, arthritis, post laminectomy syndrome, lumbar radiculopathy, a central tremor, dementia, ataxic gait, cataract surgery, cervical fusion, right carpal tunnel syndrome, post cervical laminectomy fusion. Hysterectomy and bilateral salpingo-oophorectomy, gallbladder surgery, right carpal tunnel surgery.     Past Medical History:   Diagnosis Date    Arthritis     Asthma     Chronic pain     Dementia (HCC)     GERD (gastroesophageal reflux disease)     Hypertension     Vertigo       Past Surgical History:   Procedure Laterality Date    HX APPENDECTOMY      HX BACK SURGERY  05/21/2015    stimulator Medtronic     HX CATARACT REMOVAL      bilateral    HX CERVICAL FUSION      anterior disc fusion    HX HEENT      uppp    HX LAP CHOLECYSTECTOMY      HX ORTHOPAEDIC      right carpal tunnel release    HX MATT AND BSO       Family History   Problem Relation Age of Onset    Stroke Mother     Cancer Father       Social History     Tobacco Use    Smoking status: Former Smoker    Smokeless tobacco: Never Used    Tobacco comment: stopped 25 plus years ago   Substance Use Topics    Alcohol use: No       Current Outpatient Medications   Medication Sig Dispense Refill    gabapentin (NEURONTIN) 300 mg capsule Take 1 Capsule by mouth three (3) times daily. Max Daily Amount: 900 mg. 120 Capsule 5    busPIRone (BUSPAR) 10 mg tablet TAKE 1 TABLET BY MOUTH THREE TIMES A  Tablet 5    memantine (Namenda) 10 mg tablet Take 1 Tablet by mouth two (2) times a day. 180 Tablet 5    primidone (MYSOLINE) 50 mg tablet TAKE 1/2 TABLET BY MOUTH NIGHTLY 45 Tablet 5    donepeziL (ARICEPT) 10 mg tablet Take 1 Tablet by mouth daily. 90 Tablet 3    alendronate (FOSAMAX) 70 mg tablet TAKE 1 TABLET BY MOUTH ONCE WEEKLY ON AN EMPTY STOMACH WITH NOTHING ELSE      diclofenac (VOLTAREN) 1 % gel Apply 2 g to affected area four (4) times daily.  estradioL (Estrace) 0.01 % (0.1 mg/gram) vaginal cream Insert 2 g into vagina daily.  cholecalciferol (Vitamin D3) 25 mcg (1,000 unit) cap Take  by mouth daily.  BREO ELLIPTA 100-25 mcg/dose inhaler INHALE 1 PUFF D      albuterol sulfate (PROAIR RESPICLICK) 90 mcg/actuation aepb ProAir RespiClick 90 mcg/actuation breath activated      traMADol (ULTRAM) 50 mg tablet Take 50 mg by mouth every six (6) hours as needed for Pain.  montelukast (SINGULAIR) 10 mg tablet TK 1 T PO D  3    CYANOCOBALAMIN, VITAMIN B-12, (VITAMIN B-12 PO) Take  by mouth daily.  aspirin delayed-release 81 mg tablet Take  by mouth daily.  colestipol (COLESTID) 1 gram tablet Take 1 g by mouth two (2) times a day.  traZODone (DESYREL) 50 mg tablet Take 50 mg by mouth nightly.  dicyclomine (BENTYL) 10 mg capsule Take 10 mg by mouth four (4) times daily.  losartan (COZAAR) 100 mg tablet Take 100 mg by mouth daily.         sertraline (ZOLOFT) 100 mg tablet Take 100 mg by mouth every morning.  omeprazole (PRILOSEC) 40 mg capsule Take 40 mg by mouth daily. Allergies   Allergen Reactions    Adhesive Tape Other (comments)     hurts the area it is placed on        Review of Systems:  A comprehensive review of systems was negative except for: Musculoskeletal: positive for myalgias, arthralgias, stiff joints and back pain  Neurological: positive for memory problems and tremor  Behvioral/Psych: positive for anxiety and depression     Objective:     I      NEUROLOGICAL EXAM:    Appearance: The patient is well developed, well nourished, provides a fair history and is in no acute distress. Mental Status: Oriented to time, place and person and the president, but misses the day of the month. Her speech is 100% intelligible, and she has mild cognitive impairment because of her dementia. She can remember only one of 3 words at 30 seconds with distraction, and cannot subtract serial sevens, or spell the word world backward, or draw a clock that shows a time 10 minutes to 11. Patient is a little slow mentally for some recent memory loss but actually functioning fairly well in her ADLs. Mood and affect slightly anxious and depressed. Cranial Nerves:   Intact visual fields. Fundi are benign, discs are flat no lesions seen on funduscopy. . FRANK, EOM's full, no nystagmus, no ptosis. Facial sensation is normal. Corneal reflexes are not tested. Facial movement is symmetric. Hearing is abnormal bilaterally. Palate is midline with normal sternocleidomastoid and trapezius muscles are normal. Tongue is midline. Neck without meningismus or bruits  Temporal arteries are not tender or enlarged   Motor:  4/5 strength in upper and lower proximal and distal muscles. Normal bulk and tone. No fasciculations.   Patient has negative straight leg raising test negative in the sitting position but has percussion tenderness over the lumbar spine  Patient had a spinal stimulator in the lumbar spine with stable surgical incisions  Rapid altering movement is slow in all extremities   Reflexes:   Deep tendon reflexes 1+/4 and symmetrical. No Babinski or clonus present   Sensory:   Normal to touch, pinprick and DSS, and temperature and vibration mildly decreased in both lower extremities. Gait:  Abnormal gait for age, as she does move slowly due to arthritis in her back, walk with a limp on her right leg, and normally uses a Rollator for ambulation. Tremor:   Mild intention tremor noted. Cerebellar:  Mldly abnormal Romberg and tandem cerebellar signs present  And finger-nose-finger examination shows no dysmetria. Neurovascular:  Normal heart sounds and regular rhythm, peripheral pulses decreased in both feet, and no carotid bruits. Assessment:       Plan:     Patient with new problem of having panic attacks and some worsening memory was slowly with time, we discussed the new medication the monoclonal antibody against amyloid and the fact that Medicare will not cover it, and is not really available on this is in a controlled trial, and have explained the risk and benefits the family not interested. She does not need more medication for her panic attacks, she is already on maximum dose of Namenda and Aricept, and nicely for her tremor which is helped quite a bit, and the gabapentin 300 mg 3 times a day for her neuropathy and back pain, and was advised just to continue that. For her neuropathy hemoglobin A1c was 6.1 she did have a mildly abnormal elevated protein on serum protein electrophoresis with a urine study this appeared similar, we will just repeat the serum study first to see what that shows. If we need to we can send her to hematology or do the urine test.  Her essential tremor is better on the Mysoline 25 mg at nighttime we will not increase that dose. Because of her slowly deteriorating status.   The family Alzheimer's disease Society for her with information to contact them yesterday resources they have that she might be able to benefit from. 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 She will continue the gabapentin both for her back pain and her neuropathy. Dr. Esteban Siemens talked about reactivating her spinal cord stimulator   Patient is to continue her pain management, and see Dr. Esteban Siemens next week, and hopefully get an epidural steroid injection if she needs it. She is not a surgical candidate, our only other option is physical therapy we sent her to physical therapy for gait training strengthening and therapy to her back and neck to help with her lumbar radiculopathy. Patient has essential tremor, that seems to be stable on 25 mg of Mysoline each night, we will continue that medication. Patient initially wanted the Mysoline increased because of her abdominal tremors, but these have plateaued and we will continue the BuSpar and leave the Mysoline 25 mg, to try to keep her sedation down, and she mentioned getting back on clonazepam twice a week cannot do that because it doubles her risk of death. If the tremor bothers her more she can see GI. Patient encouraged that she must keep exercising to try to maintain her strength and physical ability but she does not want any more physical therapy now. Because of increasing right leg radicular pain, we will increase her gabapentin to 4 times a day she is having no side effect on the current dose of medication, hopefully this will help relieve the pain and also help her slightly progressive tremors of essential type that she has in her hands.   Patient still has some pain with radiculopathy into her arm some neck arthritis  She with new problem of increasing tremors, look more like benign essential tremor, and are interfering with her ability to function, so we will continue her on Mysoline 25 mg at nighttime advance her gabapentin as above. Her back pain is increasing, despite numerous epidural injections and nerve ablations, and spinal cord stimulator, and advises not much else to do other than pain management and try some physical therapy but she would rather try exercise program on her own at home. Patient has had a carotid Doppler study that was okay March 2020 showed mild disease only. She is had no recurrent TIA symptoms. We discussed her condition with the patient and her daughter in detail. We encourage her to remain active and try to do her back exercises in addition and continue seeing her pain management physician  Patient is encouraged to continue her vitamins, vitamin D, remain mentally and physically active, and start exercising more  39 minutes spent with patient and the daughter, and advised her that she has a progressive degenerative brain condition that will only get worse with time. CT of the head and metabolic parameters were all normal 12 months ago  Patient is to continue her other medication and try to continue a regular exercise program  Patient is to see her pain management specialist, for continued treatment and spinal cord stimulator followup  Patient is to continue current medications and start exercising more   We discussed with her and her grand daughter in detail, if there is any doubt about her driving ability she should stop immediately or get tested by [facility]  Patient will be seen again in 6 months time or earlier as needed. She will call if any problem in the interim. Discuss her condition with patient and daughters at length,   Time of visit was 39 minutes today  She is to keep the lights on in the house at night and be careful when she moves or changes position quickly  She will call if any problem in the interim.     Signed By: Emani Oh MD     March 22, 2022

## 2022-03-23 ENCOUNTER — OFFICE VISIT (OUTPATIENT)
Dept: URGENT CARE | Age: 87
End: 2022-03-23
Payer: MEDICARE

## 2022-03-23 VITALS
SYSTOLIC BLOOD PRESSURE: 135 MMHG | OXYGEN SATURATION: 99 % | TEMPERATURE: 98 F | DIASTOLIC BLOOD PRESSURE: 62 MMHG | HEART RATE: 89 BPM | RESPIRATION RATE: 16 BRPM

## 2022-03-23 DIAGNOSIS — M25.561 RIGHT KNEE PAIN, UNSPECIFIED CHRONICITY: Primary | ICD-10-CM

## 2022-03-23 DIAGNOSIS — M79.604 PAIN OF RIGHT LOWER EXTREMITY: ICD-10-CM

## 2022-03-23 PROCEDURE — 1090F PRES/ABSN URINE INCON ASSESS: CPT | Performed by: FAMILY MEDICINE

## 2022-03-23 PROCEDURE — G9717 DOC PT DX DEP/BP F/U NT REQ: HCPCS | Performed by: FAMILY MEDICINE

## 2022-03-23 PROCEDURE — 1101F PT FALLS ASSESS-DOCD LE1/YR: CPT | Performed by: FAMILY MEDICINE

## 2022-03-23 PROCEDURE — G8419 CALC BMI OUT NRM PARAM NOF/U: HCPCS | Performed by: FAMILY MEDICINE

## 2022-03-23 PROCEDURE — G8536 NO DOC ELDER MAL SCRN: HCPCS | Performed by: FAMILY MEDICINE

## 2022-03-23 PROCEDURE — 96372 THER/PROPH/DIAG INJ SC/IM: CPT

## 2022-03-23 PROCEDURE — 99214 OFFICE O/P EST MOD 30 MIN: CPT | Performed by: FAMILY MEDICINE

## 2022-03-23 PROCEDURE — G8427 DOCREV CUR MEDS BY ELIG CLIN: HCPCS | Performed by: FAMILY MEDICINE

## 2022-03-23 RX ORDER — KETOROLAC TROMETHAMINE 30 MG/ML
30 INJECTION, SOLUTION INTRAMUSCULAR; INTRAVENOUS
Status: COMPLETED | OUTPATIENT
Start: 2022-03-23 | End: 2022-03-23

## 2022-03-23 RX ORDER — PREDNISONE 20 MG/1
20 TABLET ORAL DAILY
Qty: 5 TABLET | Refills: 0 | Status: SHIPPED | OUTPATIENT
Start: 2022-03-23 | End: 2022-03-28

## 2022-03-23 RX ADMIN — KETOROLAC TROMETHAMINE 30 MG: 30 INJECTION, SOLUTION INTRAMUSCULAR; INTRAVENOUS at 12:41

## 2022-03-23 NOTE — PROGRESS NOTES
Knee Pain  This is a chronic problem. The current episode started more than 1 week ago. The problem occurs constantly. The problem has been rapidly improving (last 24 hours ). Associated symptoms comments: Rt knee pain and radiating down to rt leg  Daily - no knee injury/ over use  Denies any new activities  No swelling   Has h/o OA and been taking Tylenol  She is also on voltarin gel and long term pain management Tramadol 50 mg bid. The symptoms are aggravated by walking and standing. The symptoms are relieved by rest. She has tried acetaminophen for the symptoms. The treatment provided mild relief.         Past Medical History:   Diagnosis Date    Arthritis     Asthma     Chronic pain     Dementia (HCC)     GERD (gastroesophageal reflux disease)     Hypertension     Vertigo         Past Surgical History:   Procedure Laterality Date    HX APPENDECTOMY      HX BACK SURGERY  05/21/2015    stimulator Medtronic     HX CATARACT REMOVAL      bilateral    HX CERVICAL FUSION      anterior disc fusion    HX HEENT      uppp    HX LAP CHOLECYSTECTOMY      HX ORTHOPAEDIC      right carpal tunnel release    HX MATT AND BSO           Family History   Problem Relation Age of Onset    Stroke Mother     Cancer Father         Social History     Socioeconomic History    Marital status:      Spouse name: Not on file    Number of children: Not on file    Years of education: Not on file    Highest education level: Not on file   Occupational History    Not on file   Tobacco Use    Smoking status: Former Smoker    Smokeless tobacco: Never Used    Tobacco comment: stopped 25 plus years ago   Vaping Use    Vaping Use: Never used   Substance and Sexual Activity    Alcohol use: No    Drug use: No    Sexual activity: Not on file   Other Topics Concern    Not on file   Social History Narrative    Not on file     Social Determinants of Health     Financial Resource Strain:     Difficulty of Paying Living Expenses: Not on file   Food Insecurity:     Worried About Running Out of Food in the Last Year: Not on file    [place] of Food in the Last Year: Not on file   Transportation Needs:     Lack of Transportation (Medical): Not on file    Lack of Transportation (Non-Medical): Not on file   Physical Activity:     Days of Exercise per Week: Not on file    Minutes of Exercise per Session: Not on file   Stress:     Feeling of Stress : Not on file   Social Connections:     Frequency of Communication with Friends and Family: Not on file    Frequency of Social Gatherings with Friends and Family: Not on file    Attends Catholic Services: Not on file    Active Member of [address] [place] [address] [employer] or Organizations: Not on file    Attends Club or Organization Meetings: Not on file    Marital Status: Not on file   Intimate Partner Violence:     Fear of Current or Ex-Partner: Not on file    Emotionally Abused: Not on file    Physically Abused: Not on file    Sexually Abused: Not on file   Housing Stability:     Unable to Pay for Housing in the Last Year: Not on file    Number of [place] in the Last Year: Not on file    Unstable Housing in the Last Year: Not on file                ALLERGIES: Adhesive tape    Review of Systems   Musculoskeletal: Positive for arthralgias, gait problem and joint swelling. All other systems reviewed and are negative. Vitals:    03/23/22 1146   BP: 135/62   Pulse: 89   Resp: 16   Temp: 98 °F (36.7 °C)   SpO2: 99%       Physical Exam  Vitals and nursing note reviewed. Constitutional:       General: She is not in acute distress. Musculoskeletal:      Right knee: Swelling and crepitus present. No effusion or erythema. Decreased range of motion. Tenderness present over the medial joint line and lateral joint line. No patellar tendon tenderness. Normal meniscus and normal patellar mobility. Right lower leg: Tenderness present. No swelling. No edema. Legs:          MDM    Procedures        ICD-10-CM ICD-9-CM    1. Right knee pain, unspecified chronicity  M25.561 719.46 DUPLEX LOWER EXT VENOUS RIGHT      DUPLEX LOWER EXT ARTERY RIGHT   2. Pain of right lower extremity  M79.604 729.5        Follow with orthopedic   May need PT  Use knee brace   Medications Ordered Today   Medications    ketorolac (TORADOL) injection 30 mg    predniSONE (DELTASONE) 20 mg tablet     Sig: Take 20 mg by mouth daily for 5 days. With food     Dispense:  5 Tablet     Refill:  0     No results found for any visits on 03/23/22. The patients condition was discussed with the patient and they understand. The patient is to follow up with primary care doctor. If signs and symptoms become worse the pt is to go to the ER. The patient is to take medications as prescribed.

## 2022-03-23 NOTE — PATIENT INSTRUCTIONS
Leg Pain: Care Instructions  Your Care Instructions  Many things can cause leg pain. Too much exercise or overuse can cause a muscle cramp (or charley horse). You can get leg cramps from not eating a balanced diet that has enough potassium, calcium, and other minerals. If you do not drink enough fluids or are taking certain medicines, you may develop leg cramps. Other causes of leg pain include injuries, blood flow problems, nerve damage, and twisted and enlarged veins (varicose veins). You can usually ease pain with self-care. Your doctor may recommend that you rest your leg and keep it elevated. Follow-up care is a key part of your treatment and safety. Be sure to make and go to all appointments, and call your doctor if you are having problems. It's also a good idea to know your test results and keep a list of the medicines you take. How can you care for yourself at home? · Take pain medicines exactly as directed. ? If the doctor gave you a prescription medicine for pain, take it as prescribed. ? If you are not taking a prescription pain medicine, ask your doctor if you can take an over-the-counter medicine. · Take any other medicines exactly as prescribed. Call your doctor if you think you are having a problem with your medicine. · Rest your leg while you have pain, and avoid standing for long periods of time. · Prop up your leg at or above the level of your heart when possible. · Make sure you are eating a balanced diet that is rich in calcium, potassium, and magnesium, especially if you are pregnant. · If directed by your doctor, put ice or a cold pack on the area for 10 to 20 minutes at a time. Put a thin cloth between the ice and your skin. · Your leg may be in a splint, a brace, or an elastic bandage, and you may have crutches to help you walk. Follow your doctor's directions about how long to wear supports and how to use the crutches. When should you call for help?    Call 911 anytime you think you may need emergency care. For example, call if:    · You have sudden chest pain and shortness of breath, or you cough up blood.     · Your leg is cool or pale or changes color. Call your doctor now or seek immediate medical care if:    · You have increasing or severe pain.     · Your leg suddenly feels weak and you cannot move it.     · You have signs of a blood clot, such as:  ? Pain in your calf, back of the knee, thigh, or groin. ? Redness and swelling in your leg or groin.     · You have signs of infection, such as:  ? Increased pain, swelling, warmth, or redness. ? Red streaks leading from the sore area. ? Pus draining from a place on your leg. ? A fever.     · You cannot bear weight on your leg. Watch closely for changes in your health, and be sure to contact your doctor if:    · You do not get better as expected. Where can you learn more? Go to http://www.gray.com/  Enter O912 in the search box to learn more about \"Leg Pain: Care Instructions. \"  Current as of: July 1, 2021               Content Version: 13.2  © 2356-8036 [employer]. Care instructions adapted under license by [employer] (which disclaims liability or warranty for this information). If you have questions about a medical condition or this instruction, always ask your healthcare professional. [place] [address] any warranty or liability for your use of this information.

## 2022-03-30 ENCOUNTER — TRANSCRIBE ORDER (OUTPATIENT)
Dept: SCHEDULING | Age: 87
End: 2022-03-30

## 2022-03-30 DIAGNOSIS — R60.0 LOCALIZED EDEMA: Primary | ICD-10-CM

## 2022-03-31 ENCOUNTER — HOSPITAL ENCOUNTER (OUTPATIENT)
Dept: ULTRASOUND IMAGING | Age: 87
Discharge: HOME | End: 2022-03-31
Attending: PAIN MEDICINE
Payer: MEDICARE

## 2022-03-31 DIAGNOSIS — R60.0 LOCALIZED EDEMA: ICD-10-CM

## 2022-03-31 PROCEDURE — 93970 EXTREMITY STUDY: CPT

## 2022-05-27 DIAGNOSIS — M48.061 SPINAL STENOSIS OF LUMBAR REGION WITHOUT NEUROGENIC CLAUDICATION: ICD-10-CM

## 2022-05-27 DIAGNOSIS — F02.80 LATE ONSET ALZHEIMER'S DISEASE WITHOUT BEHAVIORAL DISTURBANCE (HCC): ICD-10-CM

## 2022-05-27 DIAGNOSIS — I67.89 CEREBRAL MICROVASCULAR DISEASE: ICD-10-CM

## 2022-05-27 DIAGNOSIS — R26.0 ATAXIC GAIT: ICD-10-CM

## 2022-05-27 DIAGNOSIS — F02.80 DEMENTIA IN ALZHEIMER'S DISEASE WITH EARLY ONSET WITHOUT BEHAVIORAL DISTURBANCE (HCC): ICD-10-CM

## 2022-05-27 DIAGNOSIS — G30.0 DEMENTIA IN ALZHEIMER'S DISEASE WITH EARLY ONSET WITHOUT BEHAVIORAL DISTURBANCE (HCC): ICD-10-CM

## 2022-05-27 DIAGNOSIS — G30.1 LATE ONSET ALZHEIMER'S DISEASE WITHOUT BEHAVIORAL DISTURBANCE (HCC): ICD-10-CM

## 2022-05-27 DIAGNOSIS — G25.0 BENIGN ESSENTIAL TREMOR SYNDROME: ICD-10-CM

## 2022-05-27 DIAGNOSIS — I65.23 STENOSIS OF BOTH INTERNAL CAROTID ARTERIES: ICD-10-CM

## 2022-05-27 DIAGNOSIS — M79.7 FIBROMYALGIA: ICD-10-CM

## 2022-05-27 DIAGNOSIS — M96.1 LUMBAR POST-LAMINECTOMY SYNDROME: ICD-10-CM

## 2022-05-28 RX ORDER — DONEPEZIL HYDROCHLORIDE 10 MG/1
TABLET, FILM COATED ORAL
Qty: 90 TABLET | Refills: 3 | Status: SHIPPED | OUTPATIENT
Start: 2022-05-28

## 2022-07-25 ENCOUNTER — TELEPHONE (OUTPATIENT)
Dept: NEUROLOGY | Age: 87
End: 2022-07-25

## 2022-07-25 NOTE — TELEPHONE ENCOUNTER
Chrystal Morgan/granddaughter called stating that pt is now having increasing leg pain.  She would like to know if the gabapentin can be changed back to gabapentin 4 times daily 300mg

## 2022-07-26 DIAGNOSIS — F02.80 ALZHEIMER'S TYPE DEMENTIA WITH LATE ONSET WITHOUT BEHAVIORAL DISTURBANCE (HCC): ICD-10-CM

## 2022-07-26 DIAGNOSIS — G60.9 IDIOPATHIC SMALL FIBER PERIPHERAL NEUROPATHY: ICD-10-CM

## 2022-07-26 DIAGNOSIS — I65.23 STENOSIS OF BOTH INTERNAL CAROTID ARTERIES: ICD-10-CM

## 2022-07-26 DIAGNOSIS — M54.16 LUMBAR RADICULAR PAIN: ICD-10-CM

## 2022-07-26 DIAGNOSIS — G25.0 BENIGN ESSENTIAL TREMOR SYNDROME: ICD-10-CM

## 2022-07-26 DIAGNOSIS — G30.1 ALZHEIMER'S TYPE DEMENTIA WITH LATE ONSET WITHOUT BEHAVIORAL DISTURBANCE (HCC): ICD-10-CM

## 2022-07-26 DIAGNOSIS — F02.80 DEMENTIA IN ALZHEIMER'S DISEASE WITH EARLY ONSET WITHOUT BEHAVIORAL DISTURBANCE (HCC): ICD-10-CM

## 2022-07-26 DIAGNOSIS — M79.7 FIBROMYALGIA: ICD-10-CM

## 2022-07-26 DIAGNOSIS — G30.0 DEMENTIA IN ALZHEIMER'S DISEASE WITH EARLY ONSET WITHOUT BEHAVIORAL DISTURBANCE (HCC): ICD-10-CM

## 2022-07-26 DIAGNOSIS — M48.061 SPINAL STENOSIS OF LUMBAR REGION WITHOUT NEUROGENIC CLAUDICATION: ICD-10-CM

## 2022-07-26 DIAGNOSIS — E11.42 DIABETIC PERIPHERAL NEUROPATHY ASSOCIATED WITH TYPE 2 DIABETES MELLITUS (HCC): ICD-10-CM

## 2022-07-26 DIAGNOSIS — R26.0 ATAXIC GAIT: ICD-10-CM

## 2022-07-26 DIAGNOSIS — G30.1 LATE ONSET ALZHEIMER'S DISEASE WITHOUT BEHAVIORAL DISTURBANCE (HCC): ICD-10-CM

## 2022-07-26 DIAGNOSIS — I67.89 CEREBRAL MICROVASCULAR DISEASE: ICD-10-CM

## 2022-07-26 DIAGNOSIS — E53.8 B12 DEFICIENCY: ICD-10-CM

## 2022-07-26 DIAGNOSIS — F02.80 LATE ONSET ALZHEIMER'S DISEASE WITHOUT BEHAVIORAL DISTURBANCE (HCC): ICD-10-CM

## 2022-07-26 DIAGNOSIS — M96.1 LUMBAR POST-LAMINECTOMY SYNDROME: ICD-10-CM

## 2022-07-26 RX ORDER — GABAPENTIN 300 MG/1
300 CAPSULE ORAL 4 TIMES DAILY
Qty: 120 CAPSULE | Refills: 5 | Status: SHIPPED | OUTPATIENT
Start: 2022-07-26 | End: 2022-09-12 | Stop reason: SDUPTHER

## 2022-07-26 NOTE — TELEPHONE ENCOUNTER
Spoke with patient. Verified patient with two patient identifiers. (Also [facility] for Irma Hutton,)    Advised per Dr. Vinay Mancini, may ^ Gabapentin to 300 mg qid. Call back if no improvement. Patient verbalized understanding.

## 2022-07-26 NOTE — TELEPHONE ENCOUNTER
Spoke with patient. Verified patient with two patient identifiers. Advised her PHI gives us limited ways we can discuss her condition with her daughters. Advised to update her PHI form to state \"All\" so we can discuss anything with those on her PHI form including Bridget Miller and Irma Hutton. Pt gave me verbal permission to speak with them. States she is still c/o leg pain and feels ^ Gabapentin 300 mg to qid would help her a lot. ? Okay to ^ it to qid? Pls advise.

## 2022-08-15 ENCOUNTER — TELEPHONE (OUTPATIENT)
Dept: NEUROLOGY | Age: 87
End: 2022-08-15

## 2022-08-15 NOTE — TELEPHONE ENCOUNTER
Spoke with Najma Mariscal, caregiver, on HPI. Verified patient with two patient identifiers. Advised Gabapentin Rx increasing dose to qid was already sent 7- increasing to qid. Advised to call pharmacy to fill. Najma Mariscal verbalized understanding.

## 2022-08-15 NOTE — TELEPHONE ENCOUNTER
Patient's caregiver, Prasanth Estes, called to let Dr. Jen Rollins know that the Pt taking Gabapentin four times a day helped Pt's legs very much. She said its okay for Dr. Jen Rollins to send over the script for four times a day to Pt's pharmacy. She also stated Pt will be out of the Gabapentin in 2 days.  [phone]

## 2022-09-12 ENCOUNTER — TELEPHONE (OUTPATIENT)
Dept: NEUROLOGY | Age: 87
End: 2022-09-12

## 2022-09-12 DIAGNOSIS — E11.42 DIABETIC PERIPHERAL NEUROPATHY ASSOCIATED WITH TYPE 2 DIABETES MELLITUS (HCC): ICD-10-CM

## 2022-09-12 DIAGNOSIS — I65.23 STENOSIS OF BOTH INTERNAL CAROTID ARTERIES: ICD-10-CM

## 2022-09-12 DIAGNOSIS — G30.1 ALZHEIMER'S TYPE DEMENTIA WITH LATE ONSET WITHOUT BEHAVIORAL DISTURBANCE (HCC): ICD-10-CM

## 2022-09-12 DIAGNOSIS — M96.1 LUMBAR POST-LAMINECTOMY SYNDROME: ICD-10-CM

## 2022-09-12 DIAGNOSIS — I67.89 CEREBRAL MICROVASCULAR DISEASE: ICD-10-CM

## 2022-09-12 DIAGNOSIS — R26.0 ATAXIC GAIT: ICD-10-CM

## 2022-09-12 DIAGNOSIS — F02.80 LATE ONSET ALZHEIMER'S DISEASE WITHOUT BEHAVIORAL DISTURBANCE (HCC): ICD-10-CM

## 2022-09-12 DIAGNOSIS — G25.0 BENIGN ESSENTIAL TREMOR SYNDROME: ICD-10-CM

## 2022-09-12 DIAGNOSIS — E53.8 B12 DEFICIENCY: ICD-10-CM

## 2022-09-12 DIAGNOSIS — G30.1 LATE ONSET ALZHEIMER'S DISEASE WITHOUT BEHAVIORAL DISTURBANCE (HCC): ICD-10-CM

## 2022-09-12 DIAGNOSIS — G30.0 DEMENTIA IN ALZHEIMER'S DISEASE WITH EARLY ONSET WITHOUT BEHAVIORAL DISTURBANCE (HCC): ICD-10-CM

## 2022-09-12 DIAGNOSIS — G60.9 IDIOPATHIC SMALL FIBER PERIPHERAL NEUROPATHY: ICD-10-CM

## 2022-09-12 DIAGNOSIS — M79.7 FIBROMYALGIA: ICD-10-CM

## 2022-09-12 DIAGNOSIS — F02.80 ALZHEIMER'S TYPE DEMENTIA WITH LATE ONSET WITHOUT BEHAVIORAL DISTURBANCE (HCC): ICD-10-CM

## 2022-09-12 DIAGNOSIS — M48.061 SPINAL STENOSIS OF LUMBAR REGION WITHOUT NEUROGENIC CLAUDICATION: ICD-10-CM

## 2022-09-12 DIAGNOSIS — M54.16 LUMBAR RADICULAR PAIN: ICD-10-CM

## 2022-09-12 DIAGNOSIS — F02.80 DEMENTIA IN ALZHEIMER'S DISEASE WITH EARLY ONSET WITHOUT BEHAVIORAL DISTURBANCE (HCC): ICD-10-CM

## 2022-09-12 RX ORDER — GABAPENTIN 300 MG/1
300 CAPSULE ORAL 4 TIMES DAILY
Qty: 120 CAPSULE | Refills: 0 | Status: SHIPPED | OUTPATIENT
Start: 2022-09-12

## 2022-09-12 NOTE — TELEPHONE ENCOUNTER
Teddy Vale called on behalf of patient to inform Dr. Lisa Victoria that Pt is going into assisted living today and the pharmacy there needs Dr. Hermila wick to fill Patient's Gabapentin. Teddy Vale asked if Dr. Lisa Victoria could do this  today being that they are not allowed to bring any medication into the facility, so Pt will not have any medication until the pharmacy there is able to fill it. Please advise.     Anders Has #: [id number]   [address] [place] [address]  P: [phone]  F: [phone]

## 2022-09-12 NOTE — TELEPHONE ENCOUNTER
Gabapentin to [address] [facility] living which is the same as [address] [facility].     Needs 1st refill from us, then assisted living can fill it, per pt

## 2022-09-13 NOTE — TELEPHONE ENCOUNTER
Pt's daughter called about gabapentin script. Advised it was sent last night at 11:19pm.     Veena Elizalde is now asking to speak to Dr. Marichuy Chandler about giving her mother a xanax script. I advised that we have not evaluated pt for this in the past and was unsure if Dr. Marichuy Chandler will approve this type of script. Veena Elizalde asked for Dr. Marichuy Chandler to call her to talk about it.  Please call [phone]

## 2022-09-13 NOTE — TELEPHONE ENCOUNTER
We were told pt was being admitted to an assisted living home today. Shouldn't the doctor there give her the meds including Xanax?

## 2022-09-14 NOTE — TELEPHONE ENCOUNTER
Patient more nervous and anxious after moved to assisted living, and I told them we can give her Xanax because that double the risk of death, and she is already on a bunch of medicines for her nerves, and I recommended they just wait several weeks to 2 months and let her get used to it and see if she does not settle down without sedation

## 2023-04-07 NOTE — TELEPHONE ENCOUNTER
----- Message from Prince Bernal sent at 8/12/2020  2:12 PM EDT -----  Regarding: Dr Miramontes/Telephone  General Message/Vendor Calls    Caller's first and last name: Himanshu Mireles      Reason for call: Julieta Bunch is reporting that Dr Mary Hair Ree [place]- [phone]) who she sees for her back issues is recommending an increase in dosing on the Gabapentin and is requesting Dr Aliya Soler to agree to the increase to three times per day. Also, caller is requesting to schedule a follow up appt.       Callback required yes/no and why: yes      Best contact number(s):[phone]      Details to clarify the request:      Prince Bernal
I called and notified of below information
Patient is on Gabapentin 300mg twice daily    Please review about increase? Last seen 2/2020 and making a follow up about 6 months from then, but will be in September.  This will be in her MyChart if you could tell her
Prescription sent into [facility] pharmacy has requested, please let patient know
Raymond